# Patient Record
Sex: FEMALE | Race: ASIAN | Employment: OTHER | URBAN - NONMETROPOLITAN AREA
[De-identification: names, ages, dates, MRNs, and addresses within clinical notes are randomized per-mention and may not be internally consistent; named-entity substitution may affect disease eponyms.]

---

## 2020-03-16 ENCOUNTER — OFFICE VISIT (OUTPATIENT)
Dept: PRIMARY CARE CLINIC | Age: 75
End: 2020-03-16

## 2020-03-16 VITALS
HEART RATE: 68 BPM | SYSTOLIC BLOOD PRESSURE: 115 MMHG | BODY MASS INDEX: 27.82 KG/M2 | RESPIRATION RATE: 18 BRPM | WEIGHT: 167 LBS | DIASTOLIC BLOOD PRESSURE: 70 MMHG | OXYGEN SATURATION: 97 % | HEIGHT: 65 IN

## 2020-03-16 PROCEDURE — 99203 OFFICE O/P NEW LOW 30 MIN: CPT | Performed by: NURSE PRACTITIONER

## 2020-03-16 PROCEDURE — 99212 OFFICE O/P EST SF 10 MIN: CPT

## 2020-03-16 RX ORDER — DIMENHYDRINATE 50 MG/1
50 TABLET ORAL NIGHTLY PRN
COMMUNITY

## 2020-03-16 RX ORDER — LOSARTAN POTASSIUM AND HYDROCHLOROTHIAZIDE 12.5; 5 MG/1; MG/1
1 TABLET ORAL DAILY
COMMUNITY
End: 2020-03-23 | Stop reason: SDUPTHER

## 2020-03-16 RX ORDER — MECLIZINE HYDROCHLORIDE 25 MG/1
25 TABLET ORAL 3 TIMES DAILY PRN
COMMUNITY
End: 2020-03-23 | Stop reason: SDUPTHER

## 2020-03-16 RX ORDER — LEVOTHYROXINE SODIUM 88 UG/1
88 TABLET ORAL DAILY
COMMUNITY
End: 2020-03-23 | Stop reason: SDUPTHER

## 2020-03-16 SDOH — HEALTH STABILITY: MENTAL HEALTH: HOW OFTEN DO YOU HAVE A DRINK CONTAINING ALCOHOL?: NEVER

## 2020-03-16 NOTE — PROGRESS NOTES
[address]  [facility]gent Care  [address] [facility], [place] [place]   Phone: [phone]  Fax: [phone]    Date: 3/16/2020   Patient:  Sara Gutierres   YOB: 1945 Age: 67 y.o. MRN: [id number]   PCP: No primary care provider on file. Subjective:    Chief Complaint   Patient presents with    Dizziness     turns yellow, vomits and loose stools when this happens. Had surgery in right ear 20 years ago and had problems since then. [place] and they did MRI, the dizziness happens multiple times a day or week. not completly passed out. HPI: Patient presents with complaints of intermittent dizziness for the past 1-2 years, but worse the past 2 weeks (is sporadic, about once a week on average). They report associated nausea and vomiting. They deny any headaches, LOC, or head trauma (her son has caught her before she fell before). She reports some underlying chronic vertigo since a right external ear surgery about 20 years ago. Her son reports her symptoms are similar to just prior to her symptoms before her right ear surgery 20 years ago. She has tried antivert in the past. She does not have a PCP. She has not been seen by an ENT. She has not tried physical therapy or chiropractor yet. Her son reports she had a brain MRI about 6 months ago, which showed no abnormalities. She was instructed to see an ENT, but has not yet. History is obtained from the patient and previous medical records. All other review of systems negative.      No Known Allergies    Current Outpatient Medications   Medication Sig Dispense Refill    meclizine (ANTIVERT) 25 MG tablet Take 25 mg by mouth 3 times daily as needed      metFORMIN (GLUCOPHAGE) 500 MG tablet Take 500 mg by mouth 2 times daily (with meals)      levothyroxine (EUTHYROX) 88 MCG tablet Take 88 mcg by mouth Daily      losartan-hydrochlorothiazide (HYZAAR) 50-12.5 MG per tablet Take 1 tablet by mouth

## 2020-03-16 NOTE — PATIENT INSTRUCTIONS
Patient Education        Epley Maneuver for Vertigo: Exercises  Your Care Instructions  The Epley Maneuver is a series of movements your doctor may use to treat your vertigo. Here are the steps for the exercises. Your doctor or physical therapist will guide you through the movements. A single 10- to 15-minute session often is all that's needed. Crystal debris (canaliths) cause the vertigo. When your head is moved into different positions, the debris moves freely. This may cause your symptoms to stop. How to do the exercises  Step 1   1. You will sit on the doctor's exam table. Your legs will be out in front of you. The doctor or physical therapist will turn your head so that it is nursing home between looking straight ahead and looking to the side that causes the worst vertigo. 2. Without changing your head position, he or she will guide you back quickly. Your shoulders will be on the table. Your head will hang over the edge of the table. At this point, the side of your head that is causing the worst vertigo will face the floor. You'll stay in this position for 30 seconds or until your symptoms stop. Step 2   1. Then, the doctor or physical therapist will turn your head to the other side. You don't need to lift your head. The other side of your head will face the floor. You will stay in this position for 30 seconds or until your symptoms stop. Step 3   1. The doctor or physical therapist will help you roll your body in the same direction that your head is facing. You will lie on your side. (For example, if you are looking to your right, you will roll onto your right side.) The side that causes the worst symptoms should be facing up. You'll stay in this position for another 30 seconds or until your symptoms stop. Step 4   1. The doctor or physical therapist will then help you to sit back up. Your legs will hang off the table on the same side that you were facing.     Follow-up care is a key part of your treatment and safety. Be sure to make and go to all appointments, and call your doctor if you are having problems. It's also a good idea to know your test results and keep a list of the medicines you take. Where can you learn more? Go to https://denise.CruiseWise. org and sign in to your [employer] account. Enter N032 in the [employer] box to learn more about \"Epley Maneuver for Vertigo: Exercises. \"     If you do not have an account, please click on the \"Sign Up Now\" link. Current as of: July 28, 2019Content Version: 12.4  © 0732-9509 Healthwise, [employer]ope. Care instructions adapted under license by [facility] ([facility]). If you have questions about a medical condition or this instruction, always ask your healthcare professional. [place] [address] any warranty or liability for your use of this information. Patient Education        Epley Maneuver at Home for Vertigo: Exercises  Introduction  Vertigo is a spinning or whirling sensation when you move your head. Your doctor may have moved you in different positions to help your vertigo get better faster. This is called the Epley maneuver. Your doctor also may have asked you to do these exercises at home. Do the exercises as often as your doctor recommends. If your vertigo is getting worse, your doctor may have you change the exercise or stop it. Step 1  Step 1   1. Sit on the edge of a bed or sofa. Step 2   1. Turn your head 45 degrees in the direction your doctor told you to. This should be toward the ear that causes the most vertigo for you. In this picture, the woman is turning toward her left ear. Step 3   1. Tilt yourself backward until you are lying on your back. Your head should still be at a 45-degree turn. Your head should be about midway between looking straight ahead and looking out to your side. Hold for 30 seconds. If you have vertigo, stay in this position until it stops. Step 4   1.  Turn your head 90 degrees toward the ear that has the least vertigo. In this picture, the woman is turning to the right because she has vertigo on her left side. The point of your chin should be raised and over your shoulder. Hold for 30 seconds. Step 5   1. Roll onto the side with the least vertigo. You should now be looking at the floor. Hold for 30 seconds. Follow-up care is a key part of your treatment and safety. Be sure to make and go to all appointments, and call your doctor if you are having problems. It's also a good idea to know your test results and keep a list of the medicines you take. Where can you learn more? Go to https://Vital Connectpepiceweb.ENDYMION. org and sign in to your [employer] account. Enter O077 in the [employer] box to learn more about \"Epley Maneuver at Home for Vertigo: Exercises. \"     If you do not have an account, please click on the \"Sign Up Now\" link. Current as of: November 19, 2019Content Version: 12.4  © 9212-5056 Healthwise, Incorporated. Care instructions adapted under license by [facility] ([facility]). If you have questions about a medical condition or this instruction, always ask your healthcare professional. [place] [address] any warranty or liability for your use of this information. Patient Education        Dizziness: Care Instructions  Your Care Instructions  Dizziness is the feeling of unsteadiness or fuzziness in your head. It is different than having vertigo, which is a feeling that the room is spinning or that you are moving or falling. It is also different from lightheadedness, which is the feeling that you are about to faint. It can be hard to know what causes dizziness. Some people feel dizzy when they have migraine headaches. Sometimes bouts of flu can make you feel dizzy. Some medical conditions, such as heart problems or high blood pressure, can make you feel dizzy.  Many medicines can cause dizziness, including medicines for high blood pressure, speaking. ? Sudden confusion or trouble understanding simple statements. ? Sudden problems with walking or balance. ? A sudden, severe headache that is different from past headaches.    Call your doctor now or seek immediate medical care if:    · You feel dizzy and have a fever, headache, or ringing in your ears.     · You have new or increased nausea and vomiting.     · Your dizziness does not go away or comes back.    Watch closely for changes in your health, and be sure to contact your doctor if:    · You do not get better as expected. Where can you learn more? Go to https://chpepiceweb.[email or web address] org and sign in to your [employer] account. Enter O706 in the [employer] box to learn more about \"Dizziness: Care Instructions. \"     If you do not have an account, please click on the \"Sign Up Now\" link. Current as of: June 26, 2019Content Version: 12.4  © 8447-8141 Pressflip. Care instructions adapted under license by [facility] ([facility]). If you have questions about a medical condition or this instruction, always ask your healthcare professional. [place] [address] any warranty or liability for your use of this information. Patient Education        Vertigo: Exercises  Introduction  Here are some examples of exercises for you to try. The exercises may be suggested for a condition or for rehabilitation. Start each exercise slowly. Ease off the exercises if you start to have pain. You will be told when to start these exercises and which ones will work best for you. How to do the exercises  Exercise 1   1. Stand with a chair in front of you and a wall behind you. If you begin to fall, you may use them for support. 2. Stand with your feet together and your arms at your sides. 3. Move your head up and down 10 times. Exercise 2   1. Move your head side to side 10 times. Exercise 3   1. Move your head diagonally up and down 10 times. Exercise 4   1.  Move your head diagonally up and down 10 times on the other side. Follow-up care is a key part of your treatment and safety. Be sure to make and go to all appointments, and call your doctor if you are having problems. It's also a good idea to know your test results and keep a list of the medicines you take. Where can you learn more? Go to https://chpepiceweb.[email or web address] org and sign in to your [employer] account. Enter F349 in the [employer] box to learn more about \"Vertigo: Exercises. \"     If you do not have an account, please click on the \"Sign Up Now\" link. Current as of: July 28, 2019Content Version: 12.4  © 8365-1351 Healthwise, Incorporated. Care instructions adapted under license by [facility] ([facility]). If you have questions about a medical condition or this instruction, always ask your healthcare professional. [place] [address] any warranty or liability for your use of this information. Patient Education        Lightheadedness or Faintness: Care Instructions  Your Care Instructions  Lightheadedness is a feeling that you are about to faint or \"pass out. \" You do not feel as if you or your surroundings are moving. It is different from vertigo, which is the feeling that you or things around you are spinning or tilting. Lightheadedness usually goes away or gets better when you lie down. If lightheadedness gets worse, it can lead to a fainting spell. It is common to feel lightheaded from time to time. Lightheadedness usually is not caused by a serious problem. It often is caused by a short-lasting drop in blood pressure and blood flow to your head that occurs when you get up too quickly from a seated or lying position. Follow-up care is a key part of your treatment and safety. Be sure to make and go to all appointments, and call your doctor if you are having problems. It's also a good idea to know your test results and keep a list of the medicines you take.   How can you care for yourself at home? · Lie down for 1 or 2 minutes when you feel lightheaded. After lying down, sit up slowly and remain sitting for 1 to 2 minutes before slowly standing up. · Avoid movements, positions, or activities that have made you lightheaded in the past.  · Get plenty of rest, especially if you have a cold or flu, which can cause lightheadedness. · Make sure you drink plenty of fluids, especially if you have a fever or have been sweating. · Do not drive or put yourself and others in danger while you feel lightheaded. When should you call for help? Call 911 anytime you think you may need emergency care. For example, call if:    · You have symptoms of a stroke. These may include:  ? Sudden numbness, tingling, weakness, or loss of movement in your face, arm, or leg, especially on only one side of your body. ? Sudden vision changes. ? Sudden trouble speaking. ? Sudden confusion or trouble understanding simple statements. ? Sudden problems with walking or balance. ? A sudden, severe headache that is different from past headaches.     · You have symptoms of a heart attack. These may include:  ? Chest pain or pressure, or a strange feeling in the chest.  ? Sweating. ? Shortness of breath. ? Nausea or vomiting. ? Pain, pressure, or a strange feeling in the back, neck, jaw, or upper belly or in one or both shoulders or arms. ? Lightheadedness or sudden weakness. ? A fast or irregular heartbeat. After you call  911, the  may tell you to chew 1 adult-strength or 2 to 4 low-dose aspirin. Wait for an ambulance. Do not try to drive yourself.    Watch closely for changes in your health, and be sure to contact your doctor if:    · Your lightheadedness gets worse or does not get better with home care. Where can you learn more? Go to https://denise.[email or web address] org and sign in to your [employer] account.  Enter O022 in the [employer] box to learn more about \"Lightheadedness or

## 2020-03-17 ENCOUNTER — HOSPITAL ENCOUNTER (OUTPATIENT)
Dept: PHYSICAL THERAPY | Age: 75
Setting detail: THERAPIES SERIES
Discharge: HOME | End: 2020-03-17

## 2020-03-17 PROCEDURE — 97112 NEUROMUSCULAR REEDUCATION: CPT

## 2020-03-17 PROCEDURE — 97161 PT EVAL LOW COMPLEX 20 MIN: CPT

## 2020-03-17 NOTE — PROGRESS NOTES
Physical Therapy  Initial Assessment  Date: 3/17/2020  Patient Name: Sara Gutierres  MRN: [id number]  : 1945          Restrictions  Restrictions/Precautions  Restrictions/Precautions: (Does Not Speak English )    Subjective   General  Chart Reviewed: Yes  Patient assessed for rehabilitation services?: Yes  Referring Practitioner: BRITTANY Duran CNP  Referral Date : 20  Diagnosis: R42 (ICD-10-CM) - Dizziness  Subjective  Subjective: Dizziness. turns yellow, vomits and loose stools when this happens. Had surgery in right ear 20 years ago and had problems since then. [place] and they did MRI, the dizziness happens multiple times a day or week. not completly passed out. Vision/Hearing       Orientation  Orientation  Overall Orientation Status: Within Functional Limits    Social/Functional History       Objective     Observation/Palpation  Observation: + saccades, + smooth pursuit, + Left [place] with mild nystagmus per son patient states \"a little bit of dizziness. \" Right Chantelle Hallike mild to moderate nystagmus with mild to moderate dizziness. Additional Measures  Other: EO no sway noted, EC min sway                                              Assessment   Conditions Requiring Skilled Therapeutic Intervention  Body structures, Functions, Activity limitations: Decreased functional mobility ; Decreased ADL status; Vestibular Impairment  Assessment: Patient to the clinic with vestibular diagnosis. Patient had + VOR in 2 of 2 tests.  + Chantelle Hallpike bilaterally R> L.    Prognosis: Good  Decision Making: Low Complexity  REQUIRES PT FOLLOW UP: Yes  Activity Tolerance  Activity Tolerance: Patient Tolerated treatment well         Plan   Plan  Times per week: 1-2x/week   Plan weeks: 6 weeks   Current Treatment Recommendations: ADL/Self-care Training, Gait Training, Manual Therapy - Joint Manipulation, Patient/Caregiver Education & Training, Vestibular Rehab, Home Exercise Program, Safety Education & Training, Balance Training    G-Code       OutComes Score                                                  AM-PAC Score             Goals  Short term goals  Time Frame for Short term goals: 3 weeks   Short term goal 1: Initiate HEP   Short term goal 2: Perform CRM   Long term goals  Time Frame for Long term goals : 6 weeks  Long term goal 1: Indpendent in HEP   Long term goal 2: Patient to note no spinning with rolling in bed. Long term goal 3: Patient to note no nausea with bending or looking up/   Long term goal 4: Patient to note 50% improvement in dizziness.    Patient Goals   Patient goals : Decrease Dizziness        Therapy Time   Individual Concurrent Group Co-treatment   Time In 0910         Time Out 0950         Minutes 40         Timed Code Treatment Minutes: 15 [facility], PT

## 2020-03-17 NOTE — FLOWSHEET NOTE
increasing ROM, reducing/eliminating soft tissue swelling/inflammation/restriction, improving soft tissue extensibility. (72078)    Service Based Modalities:      Timed Code Treatment Minutes:   10' Neuro     Total Treatment Minutes:   36'    Treatment/Activity Tolerance:  [x] Patient tolerated treatment well [] Patient limited by fatique  [] Patient limited by pain  [] Patient limited by other medical complications  [] Other:     Prognosis: [x] Good [] Fair  [] Poor    Patient Requires Follow-up: [x] Yes  [] No      Goals:  Short term goals  Time Frame for Short term goals: 3 weeks   Short term goal 1: Initiate HEP   Short term goal 2: Perform CRM     Long term goals  Time Frame for Long term goals : 6 weeks  Long term goal 1: Indpendent in HEP   Long term goal 2: Patient to note no spinning with rolling in bed. Long term goal 3: Patient to note no nausea with bending or looking up/   Long term goal 4: Patient to note 50% improvement in dizziness.            Plan:   [] Continue per plan of care [] Alter current plan (see comments)  [x] Plan of care initiated [] Hold pending MD visit [] Discharge  Plan for Next Session:      Electronically signed by:  Zoe Irwin

## 2020-03-17 NOTE — PLAN OF CARE
[facility]    [x] [place]  Phone: [phone]  Fax: [phone]      [] [place]  Phone: [phone]  Fax: [phone]        To: Referring Practitioner: BRITTANY Yañez CNP      Patient: Carolin Chacon  : 1945   MRN: [id number]  Evaluation Date: 3/17/2020      Diagnosis Information:  · Diagnosis: R42 (ICD-10-CM) - Dizziness   ·       Physical Therapy Certification  Dear BRITTANY Yañez CNP  The following patient has been evaluated for physical therapy services and for therapy to continue, Medicare requires monthly physician review of the treatment plan. Please review the attached evaluation and/or summary of the patient's plan of care, and verify that you agree therapy should continue by signing the attached document and sending it back to our office. Plan of Care/Treatment to date:  [x] Therapeutic Exercise    [] Modalities:  [x] Therapeutic Activity     [] Ultrasound  [] Electrical Stimulation  [x] Gait Training      [] Cervical Traction [] Lumbar Traction  [x] Neuromuscular Re-education    [] Cold/hotpack [] Iontophoresis   [x] Instruction in HEP     Other:  [] Manual Therapy      []             [] Aquatic Therapy      []           ? Goals:  Short term goals  Time Frame for Short term goals: 3 weeks   Short term goal 1: Initiate HEP   Short term goal 2: Perform CRM     Long term goals  Time Frame for Long term goals : 6 weeks  Long term goal 1: Indpendent in HEP   Long term goal 2: Patient to note no spinning with rolling in bed. Long term goal 3: Patient to note no nausea with bending or looking up/   Long term goal 4: Patient to note 50% improvement in dizziness. Frequency/Duration:3/17/20-20  # Days per week: [] 1 day # Weeks: [] 1 week [] 5 weeks     [x] 2 days?    [] 2 weeks [x] 6 weeks     [x] 3 days   [] 3 weeks [] 7 weeks     [] 4 days   [] 4 weeks [] 8 weeks    Rehab Potential: [] Excellent [x] Good [] Fair  [] Poor     Electronically signed by:  Jesica Chung PT    If you have any questions or concerns, please don't hesitate to call.   Thank you for your referral.      Physician Signature:________________________________Date:__________________  By signing above, therapists plan is approved by physician

## 2020-03-18 ENCOUNTER — OFFICE VISIT (OUTPATIENT)
Dept: PRIMARY CARE CLINIC | Age: 75
End: 2020-03-18

## 2020-03-18 VITALS
BODY MASS INDEX: 27.59 KG/M2 | HEART RATE: 76 BPM | SYSTOLIC BLOOD PRESSURE: 102 MMHG | WEIGHT: 165.6 LBS | RESPIRATION RATE: 12 BRPM | TEMPERATURE: 97.6 F | HEIGHT: 65 IN | OXYGEN SATURATION: 96 % | DIASTOLIC BLOOD PRESSURE: 64 MMHG

## 2020-03-18 PROCEDURE — 99212 OFFICE O/P EST SF 10 MIN: CPT

## 2020-03-18 PROCEDURE — 99214 OFFICE O/P EST MOD 30 MIN: CPT | Performed by: NURSE PRACTITIONER

## 2020-03-18 RX ORDER — PROPYLENE GLYCOL/PEG 400 0.3 %-0.4%
DROPS OPHTHALMIC (EYE)
Qty: 1 BOTTLE | Refills: 0 | Status: SHIPPED | OUTPATIENT
Start: 2020-03-18 | End: 2020-06-05

## 2020-03-18 ASSESSMENT — ENCOUNTER SYMPTOMS
NAUSEA: 0
DOUBLE VISION: 0
PHOTOPHOBIA: 0
FOREIGN BODY SENSATION: 0
EYE REDNESS: 0
EYE ITCHING: 0
EYE PAIN: 0
BLURRED VISION: 0
VOMITING: 0
EYE DISCHARGE: 0

## 2020-03-18 NOTE — PROGRESS NOTES
Subjective:      Patient ID: Sara Gutierres is a 67 y.o. female. Eye Problem    The right eye is affected. This is a new problem. The current episode started today. The problem occurs constantly. The problem has been unchanged. There was no injury mechanism. The pain is at a severity of 0/10. The patient is experiencing no pain. There is no known exposure to pink eye. She does not wear contacts. Pertinent negatives include no blurred vision, eye discharge, double vision, eye redness, fever, foreign body sensation, itching, nausea, photophobia, recent URI or vomiting. She has tried eye drops for the symptoms. Improvement on treatment: no change. Review of Systems   Constitutional: Negative for fever. Eyes: Negative for blurred vision, double vision, photophobia, pain, discharge, redness, itching and visual disturbance. Gastrointestinal: Negative for nausea and vomiting. All other systems reviewed and are negative. Objective:   Physical Exam  Vitals signs and nursing note reviewed. Constitutional:       Appearance: Normal appearance. HENT:      Head: Normocephalic and atraumatic. Nose: Nose normal.   Eyes:      General: Lids are normal.         Right eye: No discharge. Left eye: No discharge. Extraocular Movements: Extraocular movements intact. Conjunctiva/sclera:      Right eye: Right conjunctiva is not injected. Hemorrhage present. No chemosis or exudate. Pupils: Pupils are equal, round, and reactive to light. Pulmonary:      Effort: Pulmonary effort is normal.   Skin:     General: Skin is warm and dry. Neurological:      General: No focal deficit present. Mental Status: She is alert and oriented to person, place, and time. Psychiatric:         Mood and Affect: Mood normal.         Behavior: Behavior normal.         Thought Content: Thought content normal.         Judgment: Judgment normal.         Assessment/Plan:      1.  Conjunctival hemorrhage of right

## 2020-03-23 RX ORDER — BISOPROLOL FUMARATE 5 MG/1
5 TABLET ORAL EVERY MORNING
Qty: 30 TABLET | Refills: 11 | Status: SHIPPED | OUTPATIENT
Start: 2020-03-23 | End: 2020-04-29

## 2020-03-23 RX ORDER — MECLIZINE HYDROCHLORIDE 25 MG/1
25 TABLET ORAL 3 TIMES DAILY PRN
Qty: 60 TABLET | Refills: 3 | Status: SHIPPED | OUTPATIENT
Start: 2020-03-23 | End: 2021-01-27 | Stop reason: SDUPTHER

## 2020-03-23 RX ORDER — PAROXETINE 10 MG/1
20 TABLET, FILM COATED ORAL DAILY
Qty: 60 TABLET | Refills: 11 | Status: SHIPPED | OUTPATIENT
Start: 2020-03-23 | End: 2020-04-28 | Stop reason: SDUPTHER

## 2020-03-23 RX ORDER — LOSARTAN POTASSIUM AND HYDROCHLOROTHIAZIDE 12.5; 5 MG/1; MG/1
1 TABLET ORAL DAILY
Qty: 30 TABLET | Refills: 11 | Status: SHIPPED | OUTPATIENT
Start: 2020-03-23 | End: 2020-06-05 | Stop reason: ALTCHOICE

## 2020-03-23 RX ORDER — OMEPRAZOLE 20 MG/1
20 CAPSULE, DELAYED RELEASE ORAL DAILY
Qty: 30 CAPSULE | Refills: 11 | Status: SHIPPED | OUTPATIENT
Start: 2020-03-23 | End: 2020-07-27 | Stop reason: SDUPTHER

## 2020-03-23 RX ORDER — LEVOTHYROXINE SODIUM 88 UG/1
88 TABLET ORAL DAILY
Qty: 30 TABLET | Refills: 11 | Status: SHIPPED | OUTPATIENT
Start: 2020-03-23 | End: 2021-01-27 | Stop reason: SDUPTHER

## 2020-03-24 ENCOUNTER — APPOINTMENT (OUTPATIENT)
Dept: PHYSICAL THERAPY | Age: 75
End: 2020-03-24

## 2020-04-28 ENCOUNTER — OFFICE VISIT (OUTPATIENT)
Dept: INTERNAL MEDICINE | Age: 75
End: 2020-04-28
Payer: MEDICAID

## 2020-04-28 ENCOUNTER — HOSPITAL ENCOUNTER (OUTPATIENT)
Dept: LAB | Age: 75
Discharge: HOME | End: 2020-04-28
Payer: MEDICAID

## 2020-04-28 VITALS
SYSTOLIC BLOOD PRESSURE: 110 MMHG | HEART RATE: 66 BPM | HEIGHT: 65 IN | RESPIRATION RATE: 16 BRPM | WEIGHT: 167.6 LBS | DIASTOLIC BLOOD PRESSURE: 64 MMHG | BODY MASS INDEX: 27.92 KG/M2

## 2020-04-28 PROBLEM — E03.9 HYPOTHYROIDISM: Status: ACTIVE | Noted: 2020-04-28

## 2020-04-28 LAB
ABSOLUTE EOS #: 0.18 K/UL (ref 0–0.44)
ABSOLUTE IMMATURE GRANULOCYTE: <0.03 K/UL (ref 0–0.3)
ABSOLUTE LYMPH #: 2.49 K/UL (ref 1.1–3.7)
ABSOLUTE MONO #: 0.42 K/UL (ref 0.1–1.2)
ALBUMIN SERPL-MCNC: 4.4 G/DL (ref 3.5–5.2)
ALBUMIN/GLOBULIN RATIO: 1.5 (ref 1–2.5)
ALP BLD-CCNC: 58 U/L (ref 35–104)
ALT SERPL-CCNC: 20 U/L (ref 5–33)
ANION GAP SERPL CALCULATED.3IONS-SCNC: 11 MMOL/L (ref 9–17)
AST SERPL-CCNC: 19 U/L
BASOPHILS # BLD: 1 % (ref 0–2)
BASOPHILS ABSOLUTE: 0.05 K/UL (ref 0–0.2)
BILIRUB SERPL-MCNC: 0.37 MG/DL (ref 0.3–1.2)
BUN BLDV-MCNC: 24 MG/DL (ref 8–23)
BUN/CREAT BLD: 23 (ref 9–20)
CALCIUM SERPL-MCNC: 9 MG/DL (ref 8.6–10.4)
CHLORIDE BLD-SCNC: 101 MMOL/L (ref 98–107)
CO2: 27 MMOL/L (ref 20–31)
CREAT SERPL-MCNC: 1.06 MG/DL (ref 0.5–0.9)
DIFFERENTIAL TYPE: NORMAL
EOSINOPHILS RELATIVE PERCENT: 3 % (ref 1–4)
GFR AFRICAN AMERICAN: >60 ML/MIN
GFR NON-AFRICAN AMERICAN: 50 ML/MIN
GFR SERPL CREATININE-BSD FRML MDRD: ABNORMAL ML/MIN/{1.73_M2}
GFR SERPL CREATININE-BSD FRML MDRD: ABNORMAL ML/MIN/{1.73_M2}
GLUCOSE BLD-MCNC: 120 MG/DL (ref 70–99)
HCT VFR BLD CALC: 37.2 % (ref 36.3–47.1)
HEMOGLOBIN: 12.2 G/DL (ref 11.9–15.1)
IMMATURE GRANULOCYTES: 0 %
LYMPHOCYTES # BLD: 36 % (ref 24–43)
MCH RBC QN AUTO: 29.3 PG (ref 25.2–33.5)
MCHC RBC AUTO-ENTMCNC: 32.8 G/DL (ref 25.2–33.5)
MCV RBC AUTO: 89.4 FL (ref 82.6–102.9)
MONOCYTES # BLD: 6 % (ref 3–12)
NRBC AUTOMATED: NORMAL PER 100 WBC
PDW BLD-RTO: 13.9 % (ref 11.8–14.4)
PLATELET # BLD: 245 K/UL (ref 138–453)
PLATELET ESTIMATE: NORMAL
PMV BLD AUTO: 11.3 FL (ref 8.1–13.5)
POTASSIUM SERPL-SCNC: 4.5 MMOL/L (ref 3.7–5.3)
RBC # BLD: 4.16 M/UL (ref 3.95–5.11)
RBC # BLD: NORMAL 10*6/UL
SEG NEUTROPHILS: 55 % (ref 36–65)
SEGMENTED NEUTROPHILS ABSOLUTE COUNT: 3.76 K/UL (ref 1.5–8.1)
SODIUM BLD-SCNC: 139 MMOL/L (ref 135–144)
TOTAL PROTEIN: 7.3 G/DL (ref 6.4–8.3)
TSH SERPL DL<=0.05 MIU/L-ACNC: 1.27 MIU/L (ref 0.3–5)
VITAMIN B-12: 348 PG/ML (ref 232–1245)
VITAMIN D 25-HYDROXY: 29.2 NG/ML (ref 30–100)
WBC # BLD: 6.9 K/UL (ref 3.5–11.3)
WBC # BLD: NORMAL 10*3/UL

## 2020-04-28 PROCEDURE — G8417 CALC BMI ABV UP PARAM F/U: HCPCS | Performed by: INTERNAL MEDICINE

## 2020-04-28 PROCEDURE — G8400 PT W/DXA NO RESULTS DOC: HCPCS | Performed by: INTERNAL MEDICINE

## 2020-04-28 PROCEDURE — 82306 VITAMIN D 25 HYDROXY: CPT

## 2020-04-28 PROCEDURE — 1123F ACP DISCUSS/DSCN MKR DOCD: CPT | Performed by: INTERNAL MEDICINE

## 2020-04-28 PROCEDURE — 4040F PNEUMOC VAC/ADMIN/RCVD: CPT | Performed by: INTERNAL MEDICINE

## 2020-04-28 PROCEDURE — G8427 DOCREV CUR MEDS BY ELIG CLIN: HCPCS | Performed by: INTERNAL MEDICINE

## 2020-04-28 PROCEDURE — 1090F PRES/ABSN URINE INCON ASSESS: CPT | Performed by: INTERNAL MEDICINE

## 2020-04-28 PROCEDURE — 36415 COLL VENOUS BLD VENIPUNCTURE: CPT

## 2020-04-28 PROCEDURE — 99204 OFFICE O/P NEW MOD 45 MIN: CPT

## 2020-04-28 PROCEDURE — 85025 COMPLETE CBC W/AUTO DIFF WBC: CPT

## 2020-04-28 PROCEDURE — 80053 COMPREHEN METABOLIC PANEL: CPT

## 2020-04-28 PROCEDURE — 99204 OFFICE O/P NEW MOD 45 MIN: CPT | Performed by: INTERNAL MEDICINE

## 2020-04-28 PROCEDURE — 1036F TOBACCO NON-USER: CPT | Performed by: INTERNAL MEDICINE

## 2020-04-28 PROCEDURE — 84443 ASSAY THYROID STIM HORMONE: CPT

## 2020-04-28 PROCEDURE — 82607 VITAMIN B-12: CPT

## 2020-04-28 RX ORDER — PAROXETINE HYDROCHLORIDE 20 MG/1
20 TABLET, FILM COATED ORAL DAILY
Qty: 30 TABLET | Refills: 3 | Status: SHIPPED | OUTPATIENT
Start: 2020-04-28 | End: 2020-06-30

## 2020-04-28 RX ORDER — FLUTICASONE PROPIONATE 50 MCG
1 SPRAY, SUSPENSION (ML) NASAL DAILY
Qty: 1 BOTTLE | Refills: 5 | Status: SHIPPED | OUTPATIENT
Start: 2020-04-28 | End: 2020-07-16 | Stop reason: SDUPTHER

## 2020-04-29 RX ORDER — PROPRANOLOL HCL 60 MG
60 CAPSULE, EXTENDED RELEASE 24HR ORAL DAILY
Qty: 30 CAPSULE | Refills: 5 | Status: SHIPPED | OUTPATIENT
Start: 2020-04-29 | End: 2020-07-27 | Stop reason: SDUPTHER

## 2020-05-11 ENCOUNTER — TELEPHONE (OUTPATIENT)
Dept: INTERNAL MEDICINE | Age: 75
End: 2020-05-11

## 2020-05-11 NOTE — TELEPHONE ENCOUNTER
Sent meter and supplies as well as Hydrocortisone cream to [facility]. Can use cream on her hands twice daily as needed until they heal. She should ovoid putting it on open wounds in her hands.

## 2020-05-15 ENCOUNTER — TELEPHONE (OUTPATIENT)
Dept: INTERNAL MEDICINE | Age: 75
End: 2020-05-15

## 2020-05-15 RX ORDER — TEMAZEPAM 15 MG/1
15 CAPSULE ORAL NIGHTLY PRN
Qty: 30 CAPSULE | Refills: 2 | Status: SHIPPED | OUTPATIENT
Start: 2020-05-15 | End: 2020-05-29

## 2020-05-18 RX ORDER — BLOOD SUGAR DIAGNOSTIC
STRIP MISCELLANEOUS
Qty: 100 STRIP | Refills: 3 | Status: SHIPPED | OUTPATIENT
Start: 2020-05-18

## 2020-05-19 ENCOUNTER — OFFICE VISIT (OUTPATIENT)
Dept: OPTOMETRY | Age: 75
End: 2020-05-19
Payer: MEDICAID

## 2020-05-19 ENCOUNTER — TELEPHONE (OUTPATIENT)
Dept: INTERNAL MEDICINE | Age: 75
End: 2020-05-19

## 2020-05-19 PROCEDURE — 99212 OFFICE O/P EST SF 10 MIN: CPT

## 2020-05-19 PROCEDURE — 99214 OFFICE O/P EST MOD 30 MIN: CPT | Performed by: OPTOMETRIST

## 2020-05-19 RX ORDER — FLUCONAZOLE 150 MG/1
150 TABLET ORAL ONCE
Qty: 1 TABLET | Refills: 1 | Status: SHIPPED | OUTPATIENT
Start: 2020-05-19 | End: 2020-05-19

## 2020-05-19 RX ORDER — KETOTIFEN FUMARATE 0.35 MG/ML
1 SOLUTION/ DROPS OPHTHALMIC 2 TIMES DAILY
Qty: 1 ML | Refills: 1 | Status: SHIPPED | OUTPATIENT
Start: 2020-05-19 | End: 2020-05-29

## 2020-05-19 ASSESSMENT — REFRACTION_MANIFEST
OD_AXIS: 082
OD_ADD: +2.50
OS_AXIS: 077
OS_CYLINDER: -1.50
OS_ADD: +2.50
OS_SPHERE: +2.00
OD_SPHERE: +3.50
OD_CYLINDER: -1.00

## 2020-05-19 ASSESSMENT — VISUAL ACUITY
OD_SC: 20/100
OD_SC+: -1
METHOD: ALLEN PICTURES
OS_SC+: -1
OS_SC: 20/60

## 2020-05-19 ASSESSMENT — SLIT LAMP EXAM - LIDS
COMMENTS: NORMAL
COMMENTS: NORMAL

## 2020-05-19 ASSESSMENT — TONOMETRY
OD_IOP_MMHG: 14
IOP_METHOD: NON-CONTACT AIR PUFF
OS_IOP_MMHG: 15

## 2020-05-19 NOTE — PROGRESS NOTES
Abdi Carlos presents today for   Chief Complaint   Patient presents with    Dry Eye     When alleries, the eye water; Not taking medication for allergies     Vision Exam   .    HPI     Dry Eye      Additional comments: When alleries, the eye water; Not taking medication for allergies               Comments     Last Vision Exam: 3 yrs ago  Last Ophthalmology Exam: n/a  Last Filled Glasses Rx: 3 yrs ago  Insurance: March  Update: Glasses  Son states that she did have a broken blood vessel a few weeks ago but it is gone now. Does wear glasses, only when she reads  Did not bring glasses with her today. Wear bifocal to see tv and reading                Current Outpatient Medications   Medication Sig Dispense Refill    ketotifen (ZADITOR) 0.025 % ophthalmic solution Place 1 drop into both eyes 2 times daily for 10 days 1 mL 1    ONETOUCH VERIO strip use 1 TEST STRIP to TEST BLOOD SUGAR twice a day 100 strip 3    temazepam (RESTORIL) 15 MG capsule Take 1 capsule by mouth nightly as needed for Sleep for up to 14 days. 30 capsule 2    blood glucose monitor kit and supplies Test 2 times a day & as needed for symptoms of irregular blood glucose. 1 kit 0    hydrocortisone 2.5 % cream Apply topically 2 times daily.  1 Tube 0    propranolol (INDERAL LA) 60 MG extended release capsule Take 1 capsule by mouth daily 30 capsule 5    fluticasone (FLONASE) 50 MCG/ACT nasal spray 1 spray by Each Nostril route daily 1 Bottle 5    PARoxetine (PAXIL) 20 MG tablet Take 1 tablet by mouth daily 30 tablet 3    levothyroxine (EUTHYROX) 88 MCG tablet Take 1 tablet by mouth Daily 30 tablet 11    losartan-hydrochlorothiazide (HYZAAR) 50-12.5 MG per tablet Take 1 tablet by mouth daily 30 tablet 11    meclizine (ANTIVERT) 25 MG tablet Take 1 tablet by mouth 3 times daily as needed for Dizziness 60 tablet 3    omeprazole (PRILOSEC) 20 MG delayed release capsule Take 1 capsule by mouth Daily 30 capsule 11    metFORMIN (GLUCOPHAGE)

## 2020-05-19 NOTE — TELEPHONE ENCOUNTER
Patients son called. Patient has a yeast infection, she believes. She is on many medications and tells him she has many indications of  Yeast infection in  [facility] and [place] parts\".   Can you call something to [facility]   Let him know [phone]

## 2020-05-20 ENCOUNTER — TELEPHONE (OUTPATIENT)
Dept: INTERNAL MEDICINE | Age: 75
End: 2020-05-20

## 2020-05-26 ENCOUNTER — HOSPITAL ENCOUNTER (OUTPATIENT)
Dept: ULTRASOUND IMAGING | Age: 75
Discharge: HOME | End: 2020-05-28
Payer: MEDICAID

## 2020-05-26 ENCOUNTER — TELEPHONE (OUTPATIENT)
Dept: INTERNAL MEDICINE | Age: 75
End: 2020-05-26

## 2020-05-26 ENCOUNTER — HOSPITAL ENCOUNTER (OUTPATIENT)
Age: 75
Setting detail: SPECIMEN
Discharge: HOME | End: 2020-05-26
Payer: MEDICAID

## 2020-05-26 ENCOUNTER — OFFICE VISIT (OUTPATIENT)
Dept: OBGYN | Age: 75
End: 2020-05-26
Payer: MEDICAID

## 2020-05-26 VITALS
HEART RATE: 72 BPM | TEMPERATURE: 96.7 F | BODY MASS INDEX: 27.99 KG/M2 | DIASTOLIC BLOOD PRESSURE: 80 MMHG | RESPIRATION RATE: 18 BRPM | SYSTOLIC BLOOD PRESSURE: 124 MMHG | HEIGHT: 65 IN | WEIGHT: 168 LBS

## 2020-05-26 PROCEDURE — 99214 OFFICE O/P EST MOD 30 MIN: CPT

## 2020-05-26 PROCEDURE — G8400 PT W/DXA NO RESULTS DOC: HCPCS | Performed by: OBSTETRICS & GYNECOLOGY

## 2020-05-26 PROCEDURE — 1090F PRES/ABSN URINE INCON ASSESS: CPT | Performed by: OBSTETRICS & GYNECOLOGY

## 2020-05-26 PROCEDURE — 1036F TOBACCO NON-USER: CPT | Performed by: OBSTETRICS & GYNECOLOGY

## 2020-05-26 PROCEDURE — G8427 DOCREV CUR MEDS BY ELIG CLIN: HCPCS | Performed by: OBSTETRICS & GYNECOLOGY

## 2020-05-26 PROCEDURE — 99203 OFFICE O/P NEW LOW 30 MIN: CPT | Performed by: OBSTETRICS & GYNECOLOGY

## 2020-05-26 PROCEDURE — 87070 CULTURE OTHR SPECIMN AEROBIC: CPT

## 2020-05-26 PROCEDURE — 1123F ACP DISCUSS/DSCN MKR DOCD: CPT | Performed by: OBSTETRICS & GYNECOLOGY

## 2020-05-26 PROCEDURE — G8417 CALC BMI ABV UP PARAM F/U: HCPCS | Performed by: OBSTETRICS & GYNECOLOGY

## 2020-05-26 PROCEDURE — 87086 URINE CULTURE/COLONY COUNT: CPT

## 2020-05-26 PROCEDURE — 76830 TRANSVAGINAL US NON-OB: CPT

## 2020-05-26 PROCEDURE — 4040F PNEUMOC VAC/ADMIN/RCVD: CPT | Performed by: OBSTETRICS & GYNECOLOGY

## 2020-05-26 PROCEDURE — 76856 US EXAM PELVIC COMPLETE: CPT

## 2020-05-26 PROCEDURE — 86403 PARTICLE AGGLUT ANTBDY SCRN: CPT

## 2020-05-26 RX ORDER — CLOTRIMAZOLE AND BETAMETHASONE DIPROPIONATE 10; .64 MG/G; MG/G
CREAM TOPICAL
Qty: 1 TUBE | Refills: 0 | Status: SHIPPED | OUTPATIENT
Start: 2020-05-26 | End: 2020-06-23 | Stop reason: SDUPTHER

## 2020-05-26 NOTE — PROGRESS NOTES
Subjective:      Patient ID: Philipp Gavin  is a 67 y.o. y.o. female. C/o vaginal discharge and irritation x few days   Also c/o dysuria, occ pelvic pain on the right side   Seen with intreperater    Vaginal Itching   This is a chronic problem. The current episode started more than 1 month ago. The problem occurs intermittently. The problem has been waxing and waning. Nothing aggravates the symptoms. She has tried nothing for the symptoms. The treatment provided no relief. Chief Complaint   Patient presents with    Vaginal Itching     burning sensation     Family History   Problem Relation Age of Onset    Cataracts Neg Hx     Diabetes Neg Hx     Glaucoma Neg Hx      Past Medical History:   Diagnosis Date    Chronic kidney disease     HTN (hypertension)      Past Surgical History:   Procedure Laterality Date    EXTERNAL EAR SURGERY      20 years ago      reports that she has never smoked. She has never used smokeless tobacco. She reports previous alcohol use. She reports that she does not use drugs. No Known Allergies    Current Outpatient Medications:     clotrimazole-betamethasone (LOTRISONE) 1-0.05 % cream, Apply a thin film to the affected area 2 times daily. , Disp: 1 Tube, Rfl: 0    ketotifen (ZADITOR) 0.025 % ophthalmic solution, Place 1 drop into both eyes 2 times daily for 10 days, Disp: 1 mL, Rfl: 1    ONETOUCH VERIO strip, use 1 TEST STRIP to TEST BLOOD SUGAR twice a day, Disp: 100 strip, Rfl: 3    temazepam (RESTORIL) 15 MG capsule, Take 1 capsule by mouth nightly as needed for Sleep for up to 14 days. , Disp: 30 capsule, Rfl: 2    blood glucose monitor kit and supplies, Test 2 times a day & as needed for symptoms of irregular blood glucose., Disp: 1 kit, Rfl: 0    hydrocortisone 2.5 % cream, Apply topically 2 times daily. , Disp: 1 Tube, Rfl: 0    propranolol (INDERAL LA) 60 MG extended release capsule, Take 1 capsule by mouth daily, Disp: 30 capsule, Rfl: 5    fluticasone

## 2020-05-27 LAB
CULTURE: NORMAL
Lab: NORMAL
SPECIMEN DESCRIPTION: NORMAL

## 2020-05-27 RX ORDER — CEPHALEXIN 250 MG/1
250 CAPSULE ORAL 3 TIMES DAILY
Qty: 21 CAPSULE | Refills: 0 | Status: SHIPPED | OUTPATIENT
Start: 2020-05-27 | End: 2020-06-03

## 2020-05-28 ENCOUNTER — OFFICE VISIT (OUTPATIENT)
Dept: VASCULAR SURGERY | Age: 75
End: 2020-05-28
Payer: MEDICAID

## 2020-05-28 VITALS
BODY MASS INDEX: 27.89 KG/M2 | HEIGHT: 65 IN | DIASTOLIC BLOOD PRESSURE: 62 MMHG | HEART RATE: 76 BPM | SYSTOLIC BLOOD PRESSURE: 128 MMHG | WEIGHT: 167.4 LBS

## 2020-05-28 PROBLEM — I83.812 VARICOSE VEINS OF LEFT LOWER EXTREMITY WITH PAIN: Status: ACTIVE | Noted: 2020-05-28

## 2020-05-28 PROCEDURE — 99214 OFFICE O/P EST MOD 30 MIN: CPT | Performed by: SURGERY

## 2020-05-28 PROCEDURE — 99202 OFFICE O/P NEW SF 15 MIN: CPT | Performed by: SURGERY

## 2020-05-28 PROCEDURE — G8417 CALC BMI ABV UP PARAM F/U: HCPCS | Performed by: SURGERY

## 2020-05-28 PROCEDURE — 4040F PNEUMOC VAC/ADMIN/RCVD: CPT | Performed by: SURGERY

## 2020-05-28 PROCEDURE — 1090F PRES/ABSN URINE INCON ASSESS: CPT | Performed by: SURGERY

## 2020-05-28 PROCEDURE — G8427 DOCREV CUR MEDS BY ELIG CLIN: HCPCS | Performed by: SURGERY

## 2020-05-28 PROCEDURE — 1123F ACP DISCUSS/DSCN MKR DOCD: CPT | Performed by: SURGERY

## 2020-05-28 PROCEDURE — G8400 PT W/DXA NO RESULTS DOC: HCPCS | Performed by: SURGERY

## 2020-05-28 PROCEDURE — 1036F TOBACCO NON-USER: CPT | Performed by: SURGERY

## 2020-05-28 NOTE — PROGRESS NOTES
diarrhea, constipation, abdominal pain  Genitourinary:  negative for frequency, dysuria  Integument/Breast:  negative for rash, skin lesions  Musculoskeletal:  negative for muscle aches or joint pain  Neurological:  negative for headaches, dizziness, lightheadedness, numbness, pain and tingling extremities  Behavior/Psych:  negative for depression and anxiety    Allergies: Patient has no known allergies. Current Meds:  Current Outpatient Medications:     cephALEXin (KEFLEX) 250 MG capsule, Take 1 capsule by mouth 3 times daily for 7 days, Disp: 21 capsule, Rfl: 0    clotrimazole-betamethasone (LOTRISONE) 1-0.05 % cream, Apply a thin film to the affected area 2 times daily. , Disp: 1 Tube, Rfl: 0    ketotifen (ZADITOR) 0.025 % ophthalmic solution, Place 1 drop into both eyes 2 times daily for 10 days, Disp: 1 mL, Rfl: 1    ONETOUCH VERIO strip, use 1 TEST STRIP to TEST BLOOD SUGAR twice a day, Disp: 100 strip, Rfl: 3    temazepam (RESTORIL) 15 MG capsule, Take 1 capsule by mouth nightly as needed for Sleep for up to 14 days. , Disp: 30 capsule, Rfl: 2    blood glucose monitor kit and supplies, Test 2 times a day & as needed for symptoms of irregular blood glucose., Disp: 1 kit, Rfl: 0    hydrocortisone 2.5 % cream, Apply topically 2 times daily. , Disp: 1 Tube, Rfl: 0    propranolol (INDERAL LA) 60 MG extended release capsule, Take 1 capsule by mouth daily, Disp: 30 capsule, Rfl: 5    fluticasone (FLONASE) 50 MCG/ACT nasal spray, 1 spray by Each Nostril route daily, Disp: 1 Bottle, Rfl: 5    PARoxetine (PAXIL) 20 MG tablet, Take 1 tablet by mouth daily, Disp: 30 tablet, Rfl: 3    levothyroxine (EUTHYROX) 88 MCG tablet, Take 1 tablet by mouth Daily, Disp: 30 tablet, Rfl: 11    losartan-hydrochlorothiazide (HYZAAR) 50-12.5 MG per tablet, Take 1 tablet by mouth daily, Disp: 30 tablet, Rfl: 11    meclizine (ANTIVERT) 25 MG tablet, Take 1 tablet by mouth 3 times daily as needed for Dizziness, Disp: 60 tablet, dorsalis pedis 2+ L dorsalis pedis 2+     Labs:   Lab Results   Component Value Date    WBC 6.9 04/28/2020    HGB 12.2 04/28/2020    HCT 37.2 04/28/2020    MCV 89.4 04/28/2020     04/28/2020     Lab Results   Component Value Date     04/28/2020    K 4.5 04/28/2020     04/28/2020    CO2 27 04/28/2020    BUN 24 04/28/2020    CREATININE 1.06 04/28/2020    GLUCOSE 120 04/28/2020    CALCIUM 9.0 04/28/2020     Lab Results   Component Value Date    ALKPHOS 58 04/28/2020    ALT 20 04/28/2020    AST 19 04/28/2020    PROT 7.3 04/28/2020    BILITOT 0.37 04/28/2020    LABALBU 4.4 04/28/2020     No results found for: LACTA  No results found for: AMYLASE  Lab Results   Component Value Date    LIPASE 46 11/06/2015     Lab Results   Component Value Date    INR 0.9 11/23/2015         Assessment:  3 78-year-old female with a tender varicose vein of the left lateral leg    1. Asymptomatic varicose veins of lower extremity without complication, bilateral    2. Varicose veins of left lower extremity with pain        Plan:   1. I will order a venous reflux scan to see if there is any underlying pathology. Otherwise I will call her with the results.     Orders Placed This Encounter   Procedures    VL DUP LOWER EXTREMITY VENOUS BILATERAL           Electronically signed by Teri Macedo MD on 5/28/2020 at 12:41 PM     Copy sent to Dr. Angelica Baldwin MD

## 2020-05-29 ENCOUNTER — HOSPITAL ENCOUNTER (OUTPATIENT)
Dept: INTERVENTIONAL RADIOLOGY/VASCULAR | Age: 75
Discharge: HOME | End: 2020-05-31
Payer: MEDICAID

## 2020-05-29 ENCOUNTER — HOSPITAL ENCOUNTER (OUTPATIENT)
Dept: PHYSICAL THERAPY | Age: 75
Setting detail: THERAPIES SERIES
Discharge: HOME | End: 2020-05-29
Payer: MEDICAID

## 2020-05-29 LAB
CULTURE: ABNORMAL
Lab: ABNORMAL
SPECIMEN DESCRIPTION: ABNORMAL

## 2020-05-29 PROCEDURE — 93970 EXTREMITY STUDY: CPT

## 2020-05-29 PROCEDURE — 97112 NEUROMUSCULAR REEDUCATION: CPT | Performed by: PHYSICAL THERAPIST

## 2020-05-29 NOTE — FLOWSHEET NOTE
strengthening, flexibility, endurance, ROM. (68287)  [] Reviewed/Progressed HEP activities related to improving balance, coordination, kinesthetic sense, posture, motor skill, proprioception.  (40120)    Manual Treatments:    [] Provided manual therapy to mobilize soft tissue/joints for the purpose of modulating pain, promoting relaxation,  increasing ROM, reducing/eliminating soft tissue swelling/inflammation/restriction, improving soft tissue extensibility. (28058)    Service Based Modalities:      Timed Code Treatment Minutes:   28' Neuro  Re-ed    Total Treatment Minutes:   29'    Treatment/Activity Tolerance:  [x] Patient tolerated treatment well [] Patient limited by fatique  [] Patient limited by pain  [] Patient limited by other medical complications  [] Other:     Prognosis: [x] Good [] Fair  [] Poor    Patient Requires Follow-up: [x] Yes  [] No      Goals:  Short term goals  Time Frame for Short term goals: 3 weeks   Short term goal 1: Initiate HEP   Short term goal 2: Perform CRM - met    Long term goals  Time Frame for Long term goals : 6 weeks  Long term goal 1: Indpendent in HEP   Long term goal 2: Patient to note no spinning with rolling in bed. Long term goal 3: Patient to note no nausea with bending or looking up/   Long term goal 4: Patient to note 50% improvement in dizziness.            Plan:   [x] Continue per plan of care [] Alter current plan (see comments)  [x] Plan of care initiated [] Hold pending MD visit [] Discharge  Plan for Next Session: Habituation HEP                                          Lenice Nageotte to assess for  Peripheral vs central vestibular issue    Electronically signed by:  Anna Alfaro

## 2020-05-29 NOTE — PLAN OF CARE
50% improvement in dizziness. Current Frequency/Duration:5/29/20 - 6/28/20  # Days per week: [] 1 day # Weeks: [] 1 week [x] 4 weeks      [x] 2 days? [] 2 weeks [] 5 weeks      [] 3 days   [] 3 weeks [] 6 weeks     Rehab Potential: [] Excellent [x] Good [] Fair  [] Poor     Goal Status:  [] Achieved [] Partially Achieved  [x] Not Achieved     Patient Status: [] Continue per initial plan of Care     [] Patient now discharged     [x] Additional visits requested, Please re-certify for additional visits:      Requested frequency/duration:  1-2X/week for4 weeks    Electronically signed by:  Maggie Su PT      If you have any questions or concerns, please don't hesitate to call.   Thank you for your referral.    Physician Signature:________________________________Date:__________________  By signing above, therapists plan is approved by physician

## 2020-06-04 ENCOUNTER — HOSPITAL ENCOUNTER (OUTPATIENT)
Dept: GENERAL RADIOLOGY | Age: 75
Discharge: HOME | End: 2020-06-06
Payer: MEDICAID

## 2020-06-04 ENCOUNTER — OFFICE VISIT (OUTPATIENT)
Dept: ORTHOPEDIC SURGERY | Age: 75
End: 2020-06-04
Payer: MEDICAID

## 2020-06-04 VITALS
DIASTOLIC BLOOD PRESSURE: 65 MMHG | HEIGHT: 65 IN | BODY MASS INDEX: 27.82 KG/M2 | SYSTOLIC BLOOD PRESSURE: 116 MMHG | WEIGHT: 167 LBS

## 2020-06-04 PROCEDURE — 1036F TOBACCO NON-USER: CPT | Performed by: FAMILY MEDICINE

## 2020-06-04 PROCEDURE — 1090F PRES/ABSN URINE INCON ASSESS: CPT | Performed by: FAMILY MEDICINE

## 2020-06-04 PROCEDURE — 73562 X-RAY EXAM OF KNEE 3: CPT

## 2020-06-04 PROCEDURE — 20610 DRAIN/INJ JOINT/BURSA W/O US: CPT | Performed by: FAMILY MEDICINE

## 2020-06-04 PROCEDURE — 1123F ACP DISCUSS/DSCN MKR DOCD: CPT | Performed by: FAMILY MEDICINE

## 2020-06-04 PROCEDURE — G8417 CALC BMI ABV UP PARAM F/U: HCPCS | Performed by: FAMILY MEDICINE

## 2020-06-04 PROCEDURE — 99203 OFFICE O/P NEW LOW 30 MIN: CPT | Performed by: FAMILY MEDICINE

## 2020-06-04 PROCEDURE — 4040F PNEUMOC VAC/ADMIN/RCVD: CPT | Performed by: FAMILY MEDICINE

## 2020-06-04 PROCEDURE — 99214 OFFICE O/P EST MOD 30 MIN: CPT

## 2020-06-04 PROCEDURE — G8400 PT W/DXA NO RESULTS DOC: HCPCS | Performed by: FAMILY MEDICINE

## 2020-06-04 PROCEDURE — G8427 DOCREV CUR MEDS BY ELIG CLIN: HCPCS | Performed by: FAMILY MEDICINE

## 2020-06-04 RX ORDER — TRIAMCINOLONE ACETONIDE 40 MG/ML
40 INJECTION, SUSPENSION INTRA-ARTICULAR; INTRAMUSCULAR ONCE
Status: COMPLETED | OUTPATIENT
Start: 2020-06-04 | End: 2020-06-04

## 2020-06-04 RX ORDER — BUPIVACAINE HYDROCHLORIDE 5 MG/ML
4 INJECTION, SOLUTION PERINEURAL ONCE
Status: COMPLETED | OUTPATIENT
Start: 2020-06-04 | End: 2020-06-04

## 2020-06-04 RX ADMIN — TRIAMCINOLONE ACETONIDE 40 MG: 40 INJECTION, SUSPENSION INTRA-ARTICULAR; INTRAMUSCULAR at 14:47

## 2020-06-04 RX ADMIN — BUPIVACAINE HYDROCHLORIDE 20 MG: 5 INJECTION, SOLUTION PERINEURAL at 14:46

## 2020-06-04 NOTE — PROGRESS NOTES
Relationship status: Not on file    Intimate partner violence     Fear of current or ex partner: Not on file     Emotionally abused: Not on file     Physically abused: Not on file     Forced sexual activity: Not on file   Other Topics Concern    Not on file   Social History Narrative    Not on file       Current Outpatient Medications   Medication Sig Dispense Refill    clotrimazole-betamethasone (LOTRISONE) 1-0.05 % cream Apply a thin film to the affected area 2 times daily. 1 Tube 0    ONETOUCH VERIO strip use 1 TEST STRIP to TEST BLOOD SUGAR twice a day 100 strip 3    blood glucose monitor kit and supplies Test 2 times a day & as needed for symptoms of irregular blood glucose. 1 kit 0    hydrocortisone 2.5 % cream Apply topically 2 times daily.  1 Tube 0    propranolol (INDERAL LA) 60 MG extended release capsule Take 1 capsule by mouth daily 30 capsule 5    fluticasone (FLONASE) 50 MCG/ACT nasal spray 1 spray by Each Nostril route daily 1 Bottle 5    levothyroxine (EUTHYROX) 88 MCG tablet Take 1 tablet by mouth Daily 30 tablet 11    losartan-hydrochlorothiazide (HYZAAR) 50-12.5 MG per tablet Take 1 tablet by mouth daily 30 tablet 11    meclizine (ANTIVERT) 25 MG tablet Take 1 tablet by mouth 3 times daily as needed for Dizziness 60 tablet 3    omeprazole (PRILOSEC) 20 MG delayed release capsule Take 1 capsule by mouth Daily 30 capsule 11    metFORMIN (GLUCOPHAGE) 500 MG tablet Take 1 tablet by mouth 2 times daily (with meals) 60 tablet 2    Carboxymethylcellul-Glycerin ([employer] LUBRICATING/DRY EYE) 0.5-0.9 % SOLN One drop to right eye 3-4 times per day as needed 1 Bottle 0    dimenhyDRINATE (DRAMAMINE) 50 MG tablet Take 50 mg by mouth nightly as needed      NONFORMULARY Take 500 tablets by mouth as needed (dizziness)      aspirin 325 MG tablet Take 325 mg by mouth daily      PARoxetine (PAXIL) 20 MG tablet Take 1 tablet by mouth daily 30 tablet 3     Current Facility-Administered Medications antalgic    PSYCH:  Good fund of knowledge and displays understanding of exam.    RADIOLOGY: Xr Knee Left (3 Views)    Result Date: 6/4/2020  1. No acute osseous abnormality 2. Moderate tricompartmental osteoarthritis of the right knee 3. Moderate tricompartmental osteoarthritis of the left knee     Xr Knee Right (3 Views)    Result Date: 6/4/2020  1. No acute osseous abnormality 2. Moderate tricompartmental osteoarthritis of the right knee 3. Moderate tricompartmental osteoarthritis of the left knee     IMPRESSION:     1. Primary osteoarthritis of both knees          PLAN:   We discussed some of the etiologies and natural histories of     ICD-10-CM    1. Primary osteoarthritis of both knees M17.0 triamcinolone acetonide (KENALOG-40) injection 40 mg     bupivacaine (MARCAINE) 0.5 % injection 20 mg     triamcinolone acetonide (KENALOG-40) injection 40 mg     bupivacaine (MARCAINE) 0.5 % injection 20 mg     UT ARTHROCENTESIS ASPIR&/INJ MAJOR JT/BURSA W/O US   . We discussed the various treatment alternatives including anti-inflammatory medications, physical therapy, injections, further imaging studies and as a last resort surgery. This point patient would benefit from some cortisone injections to help with her knee pain and one was given into each knee in the manner safe in chart patient tolerated procedure well follow-up with me otherwise as needed for this issue patient son voiced understanding agreement this plan  line was provided but patient and son declined    Return to clinic in No follow-ups on file. [place] Please be aware portions of this note were completed using voice recognition software and unforeseen errors may have occurred    Electronically signed by Quinton Erickson DO, FAOASM  on 6/4/20 at 2:30 PM EDT            Procedures    UT ARTHROCENTESIS ASPIR&/INJ MAJOR JT/BURSA W/O US     KNEE INJECTION PROCEDURE NOTE:  The patient was identified. The b/l knee was confirmed with the patient.   After a sterile prep with Betadine followed by an alcohol wipe the knee was injected using a bent knee lateral joint line approach with a mixture of 4 mL of 0.5% Marcaine and 40 mg of Kenalog. Patient tolerated the procedure well without post injection complications. I instructed the patient to call our office immediately if they have any swelling or increased pain at the injection site.

## 2020-06-05 ENCOUNTER — TELEPHONE (OUTPATIENT)
Dept: VASCULAR SURGERY | Age: 75
End: 2020-06-05

## 2020-06-05 ENCOUNTER — OFFICE VISIT (OUTPATIENT)
Dept: CARDIOLOGY | Age: 75
End: 2020-06-05
Payer: MEDICAID

## 2020-06-05 VITALS
HEART RATE: 82 BPM | SYSTOLIC BLOOD PRESSURE: 128 MMHG | HEIGHT: 65 IN | DIASTOLIC BLOOD PRESSURE: 72 MMHG | BODY MASS INDEX: 28.32 KG/M2 | WEIGHT: 170 LBS

## 2020-06-05 PROCEDURE — 1036F TOBACCO NON-USER: CPT | Performed by: INTERNAL MEDICINE

## 2020-06-05 PROCEDURE — G8427 DOCREV CUR MEDS BY ELIG CLIN: HCPCS | Performed by: INTERNAL MEDICINE

## 2020-06-05 PROCEDURE — 93005 ELECTROCARDIOGRAM TRACING: CPT | Performed by: INTERNAL MEDICINE

## 2020-06-05 PROCEDURE — G8400 PT W/DXA NO RESULTS DOC: HCPCS | Performed by: INTERNAL MEDICINE

## 2020-06-05 PROCEDURE — G8417 CALC BMI ABV UP PARAM F/U: HCPCS | Performed by: INTERNAL MEDICINE

## 2020-06-05 PROCEDURE — 4040F PNEUMOC VAC/ADMIN/RCVD: CPT | Performed by: INTERNAL MEDICINE

## 2020-06-05 PROCEDURE — 1123F ACP DISCUSS/DSCN MKR DOCD: CPT | Performed by: INTERNAL MEDICINE

## 2020-06-05 PROCEDURE — 99214 OFFICE O/P EST MOD 30 MIN: CPT

## 2020-06-05 PROCEDURE — 93010 ELECTROCARDIOGRAM REPORT: CPT | Performed by: INTERNAL MEDICINE

## 2020-06-05 PROCEDURE — 99203 OFFICE O/P NEW LOW 30 MIN: CPT | Performed by: INTERNAL MEDICINE

## 2020-06-05 PROCEDURE — 1090F PRES/ABSN URINE INCON ASSESS: CPT | Performed by: INTERNAL MEDICINE

## 2020-06-05 RX ORDER — ASPIRIN 81 MG/1
81 TABLET ORAL DAILY
COMMUNITY
End: 2020-06-30

## 2020-06-05 RX ORDER — TRIAMTERENE AND HYDROCHLOROTHIAZIDE 37.5; 25 MG/1; MG/1
1 TABLET ORAL DAILY
COMMUNITY
End: 2020-07-27 | Stop reason: SDUPTHER

## 2020-06-05 ASSESSMENT — ENCOUNTER SYMPTOMS
EYE DISCHARGE: 0
ABDOMINAL DISTENTION: 0
NAUSEA: 0
EYE ITCHING: 0
BACK PAIN: 0
VOMITING: 0
DIARRHEA: 0
SHORTNESS OF BREATH: 1

## 2020-06-08 ENCOUNTER — TELEPHONE (OUTPATIENT)
Dept: VASCULAR SURGERY | Age: 75
End: 2020-06-08

## 2020-06-09 ENCOUNTER — HOSPITAL ENCOUNTER (OUTPATIENT)
Dept: NON INVASIVE DIAGNOSTICS | Age: 75
Discharge: HOME | End: 2020-06-09
Payer: MEDICAID

## 2020-06-09 LAB
LV EF: 60 %
LVEF MODALITY: NORMAL

## 2020-06-09 PROCEDURE — 93270 REMOTE 30 DAY ECG REV/REPORT: CPT

## 2020-06-09 PROCEDURE — 93306 TTE W/DOPPLER COMPLETE: CPT

## 2020-06-09 PROCEDURE — 93271 ECG/MONITORING AND ANALYSIS: CPT

## 2020-06-10 ENCOUNTER — OFFICE VISIT (OUTPATIENT)
Dept: INTERNAL MEDICINE | Age: 75
End: 2020-06-10
Payer: MEDICAID

## 2020-06-10 VITALS
BODY MASS INDEX: 27.82 KG/M2 | HEART RATE: 76 BPM | HEIGHT: 65 IN | TEMPERATURE: 96.7 F | WEIGHT: 167 LBS | RESPIRATION RATE: 16 BRPM | DIASTOLIC BLOOD PRESSURE: 74 MMHG | SYSTOLIC BLOOD PRESSURE: 126 MMHG

## 2020-06-10 PROCEDURE — 4040F PNEUMOC VAC/ADMIN/RCVD: CPT | Performed by: INTERNAL MEDICINE

## 2020-06-10 PROCEDURE — G8400 PT W/DXA NO RESULTS DOC: HCPCS | Performed by: INTERNAL MEDICINE

## 2020-06-10 PROCEDURE — 1123F ACP DISCUSS/DSCN MKR DOCD: CPT | Performed by: INTERNAL MEDICINE

## 2020-06-10 PROCEDURE — G8417 CALC BMI ABV UP PARAM F/U: HCPCS | Performed by: INTERNAL MEDICINE

## 2020-06-10 PROCEDURE — 99214 OFFICE O/P EST MOD 30 MIN: CPT

## 2020-06-10 PROCEDURE — 1090F PRES/ABSN URINE INCON ASSESS: CPT | Performed by: INTERNAL MEDICINE

## 2020-06-10 PROCEDURE — 1036F TOBACCO NON-USER: CPT | Performed by: INTERNAL MEDICINE

## 2020-06-10 PROCEDURE — G8427 DOCREV CUR MEDS BY ELIG CLIN: HCPCS | Performed by: INTERNAL MEDICINE

## 2020-06-10 PROCEDURE — 99214 OFFICE O/P EST MOD 30 MIN: CPT | Performed by: INTERNAL MEDICINE

## 2020-06-10 RX ORDER — TRIAMCINOLONE ACETONIDE 1 MG/G
CREAM TOPICAL
Qty: 30 G | Refills: 0 | Status: SHIPPED | OUTPATIENT
Start: 2020-06-10

## 2020-06-10 ASSESSMENT — PATIENT HEALTH QUESTIONNAIRE - PHQ9
SUM OF ALL RESPONSES TO PHQ QUESTIONS 1-9: 0
SUM OF ALL RESPONSES TO PHQ QUESTIONS 1-9: 0
1. LITTLE INTEREST OR PLEASURE IN DOING THINGS: 0
2. FEELING DOWN, DEPRESSED OR HOPELESS: 0
SUM OF ALL RESPONSES TO PHQ9 QUESTIONS 1 & 2: 0

## 2020-06-11 ENCOUNTER — OFFICE VISIT (OUTPATIENT)
Dept: NEUROLOGY | Age: 75
End: 2020-06-11
Payer: MEDICAID

## 2020-06-11 ENCOUNTER — PRE-PROCEDURE TELEPHONE (OUTPATIENT)
Dept: PREADMISSION TESTING | Age: 75
End: 2020-06-11

## 2020-06-11 VITALS
BODY MASS INDEX: 27.99 KG/M2 | WEIGHT: 168 LBS | OXYGEN SATURATION: 97 % | DIASTOLIC BLOOD PRESSURE: 64 MMHG | SYSTOLIC BLOOD PRESSURE: 108 MMHG | HEART RATE: 77 BPM | HEIGHT: 65 IN

## 2020-06-11 PROBLEM — G89.29 CHRONIC NECK PAIN: Status: ACTIVE | Noted: 2020-06-11

## 2020-06-11 PROBLEM — R42 VERTIGO: Status: ACTIVE | Noted: 2020-06-11

## 2020-06-11 PROBLEM — F41.9 ANXIETY AND DEPRESSION: Status: ACTIVE | Noted: 2020-06-11

## 2020-06-11 PROBLEM — R73.9 HYPERGLYCEMIA: Status: ACTIVE | Noted: 2020-06-11

## 2020-06-11 PROBLEM — R29.818 POSITIVE ROMBERG TEST: Status: ACTIVE | Noted: 2020-06-11

## 2020-06-11 PROBLEM — R42 DIZZINESS: Status: ACTIVE | Noted: 2020-06-11

## 2020-06-11 PROBLEM — M54.2 CHRONIC NECK PAIN: Status: ACTIVE | Noted: 2020-06-11

## 2020-06-11 PROBLEM — F32.A ANXIETY AND DEPRESSION: Status: ACTIVE | Noted: 2020-06-11

## 2020-06-11 PROBLEM — G25.0 BENIGN ESSENTIAL TREMOR: Status: ACTIVE | Noted: 2020-06-11

## 2020-06-11 PROBLEM — R26.89 BALANCE PROBLEM: Status: ACTIVE | Noted: 2020-06-11

## 2020-06-11 PROBLEM — Z82.0 FAMILY HISTORY OF TREMOR: Status: ACTIVE | Noted: 2020-06-11

## 2020-06-11 PROCEDURE — 99205 OFFICE O/P NEW HI 60 MIN: CPT | Performed by: PSYCHIATRY & NEUROLOGY

## 2020-06-11 PROCEDURE — G8427 DOCREV CUR MEDS BY ELIG CLIN: HCPCS | Performed by: PSYCHIATRY & NEUROLOGY

## 2020-06-11 PROCEDURE — 1090F PRES/ABSN URINE INCON ASSESS: CPT | Performed by: PSYCHIATRY & NEUROLOGY

## 2020-06-11 PROCEDURE — 99214 OFFICE O/P EST MOD 30 MIN: CPT | Performed by: PSYCHIATRY & NEUROLOGY

## 2020-06-11 PROCEDURE — G8417 CALC BMI ABV UP PARAM F/U: HCPCS | Performed by: PSYCHIATRY & NEUROLOGY

## 2020-06-11 RX ORDER — PRIMIDONE 50 MG/1
TABLET ORAL
Qty: 30 TABLET | Refills: 1 | Status: SHIPPED | OUTPATIENT
Start: 2020-06-11 | End: 2020-06-30

## 2020-06-11 ASSESSMENT — ENCOUNTER SYMPTOMS
APNEA: 0
VISUAL CHANGE: 0
CHOKING: 0
CONSTIPATION: 0
SHORTNESS OF BREATH: 0
DIARRHEA: 0
EYE PAIN: 0
VOMITING: 0
EYE REDNESS: 0
EYE DISCHARGE: 0
ABDOMINAL DISTENTION: 0
ABDOMINAL PAIN: 0
PHOTOPHOBIA: 0
SINUS PRESSURE: 0
CHEST TIGHTNESS: 0
BOWEL INCONTINENCE: 0
SWOLLEN GLANDS: 0
FACIAL SWELLING: 0
VOICE CHANGE: 0
CHANGE IN BOWEL HABIT: 0
COUGH: 0
TROUBLE SWALLOWING: 0
BACK PAIN: 0
WHEEZING: 0
BLOOD IN STOOL: 0
NAUSEA: 0
SORE THROAT: 0
EYE ITCHING: 0
COLOR CHANGE: 0

## 2020-06-11 NOTE — PATIENT INSTRUCTIONS
[facility] NEUROLOGY    Due to the complex nature of our neurological testing, It is the policy of the Neurology Department not to release the results of your testing over the phone. Once all testing is completed and we have all the results back, Dr. Wendie Yang will then personally go over all the results with you and answer any questions that you may have during a follow up appointment. If you are unable to keep this appointment, please notify the [address] @ [phone], as soon as possible. Please bring your prescription bottles to all appointments. Advance Care Planning  People with COVID-19 may have no symptoms, mild symptoms, such as fever, cough, and shortness of breath or they may have more severe illness, developing severe and fatal pneumonia. As a result, Advance Care Planning with attention to naming a health care decision maker (someone you trust to make healthcare decisions for you if you could not speak for yourself) and sharing other health care preferences is important BEFORE a possible health crisis. Please contact your Primary Care Provider to discuss Advance Care Planning. Preventing the Spread of Coronavirus Disease 2019 in Homes and Residential Communities  For the most recent information go to [email or web address]    Prevention steps for People with confirmed or suspected COVID-19 (including persons under investigation) who do not need to be hospitalized  and   People with confirmed COVID-19 who were hospitalized and determined to be medically stable to go home    Your healthcare provider and public health staff will evaluate whether you can be cared for at home. If it is determined that you do not need to be hospitalized and can be isolated at home, you will be monitored by staff from your local or state health department.  You should follow the prevention steps below until a healthcare provider or local or

## 2020-06-11 NOTE — PROGRESS NOTES
AND  HER  SON      and other  available   medical  records   were  Also  reviewed. PATIENT     IS  Chinese  SPEAKING  AND  HER  SON    HELPED        IN    INTERPRETING    AND IN  COMMUNICATING     WITH  PATIENT            The  Duration,  Quality,  Severity,  Location,  Timing,  Context,  Modifying  Factors   Of   The   Chief   Complaint   And  Present  Illness       Was   Reviewed   In   [facility]   [facility].                                             PATIENT'S  MAIN  CONCERNS INCLUDE :                     1)      CHRONIC  MILD    TREMORS  BOTH  HANDS   FOR   7 -8    YEARS                                      MORE  SO  ON  LEFT  SIDE                                    -   ESSENTIAL  TREMORS                         2)       TREMORS  WORSENED  BY  ANXIETY   AND  ACTIVITY                            3)     FAMILY    H/O   TREMORS     -    FATHER                              4)     H/O   BRIEF  DIZZINESS    INTERMITTENTLY                                   -  ON  ASA   81  MG  DAILY                               5)     H/O       VERTIGO                                    BEING  FOLLOWED BY   ENT                                                                  6)       H/O     CHRONIC  MILD  NECK PAIN                                               7)     NUMBNESS    EXTREMITIES ,    MORE  SO                                      RIGHT   UPPER  EXTREMITY     FOR  ONE YEAR                                     D / D    MONONEUROPATHIES ,                                               PERIPHERAL  POLYNEUROPATHY                              8)     MILD   BALANCE  PROBLEMS                                    NO   H/O   FALLING                                   9)    MULTIPLE  CO  MORBID  MEDICAL CONDITIONS                                  BEING  FOLLOWED  BY   HER  PCP                              10)     H/O   CHRONIC  MILD  ANXIETY,   DEPRESSION                                          -  ON   PAXIL TO  FOLLOW  WITH  MENTAL  HEALTH PROFESSIONALS                               11)   VARIOUS  RISK   FACTORS   WERE  REVIEWED   AND   DISCUSSED. PATIENT   HAS  MULTIPLE   MEDICAL, NEUROLOGICAL                                     AND   MENTAL HEALTH   PROBLEMS . PATIENT'S   MANAGEMENT  IS  CHALLENGING.                                               *12)   IN  VIEW  OF  THE  PATIENT'S    MULTIPLE   NEUROLOGICAL                           SYMPTOMS  AND  CONCERNS    FOR  PROLONGED   DURATION,                           AND    MULTIPLE   CO MORBID  MEDICAL  CONDITIONS,                           PATIENT    NEEDS  NEURO  DIAGNOSTIC  EVALUATIONS                      FOR   ANY   NEUROLOGICAL  PATHOLOGIES    AND  OTHER                        CORRECTABLE   ETIOLOGIES;     AND                              PATIENT  REQUESTS   THE  SAME.                                     PRECIPITATING  FACTORS: including  fever/infection, exertion/relaxation, position change, stress, weather change,                        medications/alcohol, time of day/darkness/light  Are  absent                                                              MODIFYING  FACTORS:  fever/infection, exertion/relaxation, position change, stress, weather change,                        medications/alcohol, time of day/darkness/light  Are  absent             Patient   Indicates   The  Presence   And  The  Absence  Of  The  Following    Associated  And   Additional  Neurological    Symptoms:                                Balance  And coordination   problems  present           Gait problems     absent            Headaches      absent              Migraines           absent           Memory problemsabsent              Confusion        absent            Paresthesia   numbness          present           Seizures  And  Starring  Episodes           absent           Syncope,  Near  syncopal delayed. Speech is not rapid and pressured, slurred or tangential.         Behavior: Behavior is slowed. Behavior is not agitated, aggressive, withdrawn, hyperactive or combative. Behavior is cooperative. Thought Content: Thought content is not paranoid or delusional. Thought content does not include homicidal or suicidal ideation. Thought content does not include homicidal or suicidal plan. Cognition and Memory: Memory is not impaired. She does not exhibit impaired recent memory or impaired remote memory. Judgment: Judgment is not impulsive or inappropriate. NEUROLOGICALEXAMINATION :       A) MENTAL STATUS:                   Alert and  oriented  To time, place  And  Person. No Aphasia. No  Dysarthria. Able   To  Follow     SIMPLE   commands   without   Any  Difficulty. No right  To left confusion. Normal  Speech  And language function. Insight and  Judgment ,Fund  Of  Knowledge   within normal limits                Recent  And  Remote memory  within   normal limits                Attention &  Concentration are within   normal limits                                                 B) CRANIAL NERVES :      CN : Visual  Acuity  And  Visual fields  within normal limits               Fundi  Could  Not  Be  Could  Not  Be  Evaluated. 3,4,6 CN : Both  Pupils are   Reactive and  Equal.  Movements  Are  Intact. No  Nystagmus. No  JACKY. No  Afferent  Pupillary  Defect noted. 5 CN :  Normal  Facial sensations and Corneal  Reflexes           7 CN:  Normal  Facial  Symmetry  And  Strength. No facial  Weakness.            8 CN :  Hearing  Appears within normal limits          9, 10 CN: Normal   spontaneous, reflex   palate   movements         11 CN:   Normal  Shoulder  shrug and  strength         12 CN :   Normal  Tongue movements and  Tongue  In midline Oren Haley MD    Board Certified in  Neurology &  In  [facility] ([facility])      DISCLAIMER:   Although every effort was made to ensure the accuracy of this  electronictranscription, some errors in transcription may have occurred. GENERAL PATIENT INSTRUCTIONS:      A Healthy Lifestyle: Care Instructions   Your Care Instructions   A healthy lifestyle can help you feel good, stay at ahealthy weight, and have plenty of energy for both work and play. A healthy lifestyle is something you can share with your whole family.  A healthy lifestyle also can lower your risk for serious health problems, such ashigh blood pressure, heart disease, and diabetes.  You can follow a few steps listed below to improve your health and the health of your family.  Follow-up careis a key part of your treatment and safety. Be sure to make and go to all appointments, and call your doctor if you are having problems. Its also a good idea to know your test results and keep a list of the medicines you take.  How can you care for yourself at home?  Do not eat too much sugar, fat, or fast foods. You can still have dessert and treats nowand then. The goal is moderation.  Start small to improve your eating habits. Pay attention to portion sizes, drink less juice and soda pop, and eat more fruits and vegetables.  Eat a healthy amount of food. A 3-ounce serving of meat, for example, is about the size of a deck of cards. Fill the rest of your plate with vegetables and whole grains.  Limit theamount of soda and sports drinks you have every day. Drink more water when you are thirsty.  Eat at least 5 servings of fruits and vegetables every day. It may seem like a lot, but it is not hard to reach this goal. Aserving or helping is 1 piece of fruit, 1 cup of vegetables, or 2 cups of leafy, raw vegetables.  Have an apple or some carrot sticks as an afternoon snack instead of a candy bar. Try to have fruits and/or vegetables at everymeal.   Make exercise part of your daily routine. You may want to start with simple activities, such as walking, bicycling, or slow swimming. Try dannie active 30 to 60 minutes every day. You do not need to do all 30 to 60 minutes all at once. For example, you can exercise 3 times a day for 10 or 20 minutes. Moderate exercise is safe for most people, but it is always agood idea to talk to your doctor before starting an exercise program.   Keep moving. Lyn[place] Kaiser the lawn, work in the garden, or "[employer]. Take the stairs instead of the elevator at work.  If you smoke, quit. Peoplewho smoke have an increased risk for heart attack, stroke, cancer, and other lung illnesses. Quitting is hard, but there are ways to boost your chance of quitting tobacco for good.  Use nicotine gum, patches, or lozenges.  Ask your doctor about stop-smoking programs and medicines.  Keep trying.  In addition to reducing your risk of diseases in the future, you will notice some benefits soon after you stop using tobacco. If you have shortness of breath or asthma symptoms, they will likely getbetter within a few weeks after you quit.  Limit how much alcohol you drink. Moderate amounts of alcohol (up to 2 drinks a day for men, 1drink a day for women) are okay. But drinking too much can lead to liver problems, high blood pressure, and other health problems.  health   If you have a family, there are many things you can do together to improve your health.  Eat meals together as a family as often as possible.  Eat healthy foods. This includes fruits, vegetables, lean meats and dairy, and whole grains.  Include your family in your fitness plan. Most peoplethink of activities such as jogging or tennis as the way to fitness, but there are many ways you and your family can be more active. Anything that makes you breathe hard and gets your heart pumping is exercise.  Here are

## 2020-06-16 ENCOUNTER — HOSPITAL ENCOUNTER (OUTPATIENT)
Dept: PREADMISSION TESTING | Age: 75
Setting detail: SPECIMEN
Discharge: HOME | End: 2020-06-20
Payer: MEDICAID

## 2020-06-16 PROCEDURE — U0004 COV-19 TEST NON-CDC HGH THRU: HCPCS

## 2020-06-17 LAB
SARS-COV-2, PCR: NOT DETECTED
SARS-COV-2, RAPID: NORMAL
SARS-COV-2: NORMAL
SOURCE: NORMAL

## 2020-06-18 ENCOUNTER — TELEPHONE (OUTPATIENT)
Dept: PRIMARY CARE CLINIC | Age: 75
End: 2020-06-18

## 2020-06-19 ENCOUNTER — HOSPITAL ENCOUNTER (OUTPATIENT)
Dept: MAMMOGRAPHY | Age: 75
Discharge: HOME | End: 2020-06-21
Payer: MEDICAID

## 2020-06-19 PROCEDURE — 77063 BREAST TOMOSYNTHESIS BI: CPT

## 2020-06-23 ENCOUNTER — HOSPITAL ENCOUNTER (OUTPATIENT)
Dept: NEUROLOGY | Age: 75
Discharge: HOME | End: 2020-06-23
Payer: MEDICAID

## 2020-06-23 ENCOUNTER — TELEPHONE (OUTPATIENT)
Dept: OBGYN | Age: 75
End: 2020-06-23

## 2020-06-23 PROCEDURE — 95912 NRV CNDJ TEST 11-12 STUDIES: CPT

## 2020-06-23 PROCEDURE — 95886 MUSC TEST DONE W/N TEST COMP: CPT

## 2020-06-23 RX ORDER — CLOTRIMAZOLE AND BETAMETHASONE DIPROPIONATE 10; .64 MG/G; MG/G
CREAM TOPICAL
Qty: 2 TUBE | Refills: 0 | Status: SHIPPED | OUTPATIENT
Start: 2020-06-23 | End: 2020-09-28 | Stop reason: SDUPTHER

## 2020-06-23 NOTE — TELEPHONE ENCOUNTER
Son came to window and states that she is having the same symptoms and needs the same cream sent in please

## 2020-06-24 ENCOUNTER — HOSPITAL ENCOUNTER (OUTPATIENT)
Dept: NEUROLOGY | Age: 75
Discharge: HOME | End: 2020-06-24
Payer: MEDICAID

## 2020-06-24 PROCEDURE — 93886 INTRACRANIAL COMPLETE STUDY: CPT

## 2020-06-24 PROCEDURE — 93892 TCD EMBOLI DETECT W/O INJ: CPT

## 2020-06-24 NOTE — PROCEDURES
potentials  appear within normal limits bilaterally. Compound muscle action potentials of the right median nerve shows low  amplitude at wrist, borderline at the elbow with normal distal latency,  conduction velocity. Compound muscle action potentials of the left median nerve shows normal  amplitude, distal latency, conduction velocities. Compound muscle action potentials of the right and left ulnar nerves  shows low amplitudes at wrist and elbows bilaterally more so on the left  side with normal distal latency, conduction velocities bilaterally. Proximal conductions as measured by the F responses were normal in both  median and ulnar nerves bilaterally. Nerve  Conductions   Results  Were  Personally  Reviewed and  Analysed. Abnormal  Nerve  Conductions  Were  Personally  Repeated,  Verified, reconfirmed  And  Updated as needed  appropriately. Please    See   Wave  Forms   And    Details  Of     Nerve  Conduction   Studies   For  Additional  Information          Extensive   Needle  Electromyography  Was personally  Performed  In  Both      Upper   Extremities    In  The  Following  Muscles :    Deltoid,  Biceps  Brachii,  Triceps . Pronator  Teres,  Flexor Carpi Ulnaris,    Ext. Digitorum Communis,  FDI (Hand)      Extensive  Needle  Electromyography  Shows  No   Abnormality with :      A) Normal  insertional  Activity. There  Is  Absence  Of   P  Waves,  Fibrillations,  Fasciculations and        Other  Spontaneous   Activity. B) Voluntary  Motor unit  Potentials    Show :    Normal  Effort,  Recruitment, amplitude,  Duration. No Polyphasia  Noted. IMPRESSION:        1. There is electrodiagnostic evidence of mild median mononeuropathy at    wrist (carpal tunnel syndrome) bilaterally more so on the right side. 2.  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       3.  There is no definite electrodiagnostic evidence of lower cervical    radiculopathy, plexopathy, peripheral polyneuropathy involving examined    both upper extremities. Further clinical correlation and followup recommended.           Mendez Chapin MD  Board Certified Neurologist    D: 06/23/2020 15:06:33       T: 06/23/2020 15:29:31     [id number]  Job#: [id number]     Doc#: [id number]    CC:  Avtar Noland MD

## 2020-06-25 ENCOUNTER — OFFICE VISIT (OUTPATIENT)
Dept: VASCULAR SURGERY | Age: 75
End: 2020-06-25
Payer: MEDICAID

## 2020-06-25 VITALS
HEIGHT: 65 IN | DIASTOLIC BLOOD PRESSURE: 66 MMHG | WEIGHT: 165 LBS | BODY MASS INDEX: 27.49 KG/M2 | SYSTOLIC BLOOD PRESSURE: 126 MMHG | HEART RATE: 66 BPM

## 2020-06-25 PROCEDURE — 99214 OFFICE O/P EST MOD 30 MIN: CPT

## 2020-06-25 PROCEDURE — G8427 DOCREV CUR MEDS BY ELIG CLIN: HCPCS | Performed by: SURGERY

## 2020-06-25 PROCEDURE — 99213 OFFICE O/P EST LOW 20 MIN: CPT | Performed by: SURGERY

## 2020-06-25 PROCEDURE — 4040F PNEUMOC VAC/ADMIN/RCVD: CPT | Performed by: SURGERY

## 2020-06-25 PROCEDURE — 1123F ACP DISCUSS/DSCN MKR DOCD: CPT | Performed by: SURGERY

## 2020-06-25 PROCEDURE — 1036F TOBACCO NON-USER: CPT | Performed by: SURGERY

## 2020-06-25 PROCEDURE — G8400 PT W/DXA NO RESULTS DOC: HCPCS | Performed by: SURGERY

## 2020-06-25 PROCEDURE — G8417 CALC BMI ABV UP PARAM F/U: HCPCS | Performed by: SURGERY

## 2020-06-25 PROCEDURE — 1090F PRES/ABSN URINE INCON ASSESS: CPT | Performed by: SURGERY

## 2020-06-25 NOTE — PROGRESS NOTES
Component Value Date    WBC 6.9 04/28/2020    HGB 12.2 04/28/2020    HCT 37.2 04/28/2020    MCV 89.4 04/28/2020     04/28/2020     Lab Results   Component Value Date     04/28/2020    K 4.5 04/28/2020     04/28/2020    CO2 27 04/28/2020    BUN 24 04/28/2020    CREATININE 1.06 04/28/2020    GLUCOSE 120 04/28/2020    CALCIUM 9.0 04/28/2020     Lab Results   Component Value Date    ALKPHOS 58 04/28/2020    ALT 20 04/28/2020    AST 19 04/28/2020    PROT 7.3 04/28/2020    BILITOT 0.37 04/28/2020    LABALBU 4.4 04/28/2020     No results found for: LACTA  No results found for: AMYLASE  Lab Results   Component Value Date    LIPASE 46 11/06/2015     Lab Results   Component Value Date    INR 0.9 11/23/2015         Assessment:  3 65-year-old female with a tender varicose vein of the left lateral leg    1. Varicose veins of left lower extremity with pain        Plan:   1. Her ultrasound shows multilevel venous reflux including the saphenofemoral junctions bilaterally and 6 mm proximal great saphenous veins. 2. She will be scheduled for endovenous ablation of her left great saphenous vein with phlebectomies in the upcoming weeks. 3. I think she would be a good candidate for [facility] if approved by her insurance. No orders of the defined types were placed in this encounter.           Electronically signed by Amanuel Salcido MD on 6/25/2020 at 11:19 AM     Copy sent to Dr. Farzad Jordan MD

## 2020-06-29 ENCOUNTER — HOSPITAL ENCOUNTER (OUTPATIENT)
Dept: INTERVENTIONAL RADIOLOGY/VASCULAR | Age: 75
Discharge: HOME | End: 2020-07-01
Payer: MEDICAID

## 2020-06-29 ENCOUNTER — HOSPITAL ENCOUNTER (OUTPATIENT)
Dept: CT IMAGING | Age: 75
Discharge: HOME | End: 2020-07-01
Payer: MEDICAID

## 2020-06-29 PROCEDURE — 70450 CT HEAD/BRAIN W/O DYE: CPT

## 2020-06-29 PROCEDURE — 93880 EXTRACRANIAL BILAT STUDY: CPT

## 2020-07-01 ENCOUNTER — PRE-PROCEDURE TELEPHONE (OUTPATIENT)
Dept: PREADMISSION TESTING | Age: 75
End: 2020-07-01

## 2020-07-03 ENCOUNTER — HOSPITAL ENCOUNTER (OUTPATIENT)
Dept: PREADMISSION TESTING | Age: 75
Setting detail: SPECIMEN
Discharge: HOME | End: 2020-07-07
Payer: MEDICAID

## 2020-07-03 PROCEDURE — U0004 COV-19 TEST NON-CDC HGH THRU: HCPCS

## 2020-07-04 LAB
SARS-COV-2, PCR: NOT DETECTED
SARS-COV-2, RAPID: NORMAL
SARS-COV-2: NORMAL
SOURCE: NORMAL

## 2020-07-05 ENCOUNTER — TELEPHONE (OUTPATIENT)
Dept: PRIMARY CARE CLINIC | Age: 75
End: 2020-07-05

## 2020-07-10 ENCOUNTER — OFFICE VISIT (OUTPATIENT)
Dept: NEUROLOGY | Age: 75
End: 2020-07-10
Payer: MEDICAID

## 2020-07-10 VITALS
OXYGEN SATURATION: 98 % | HEIGHT: 65 IN | TEMPERATURE: 96.9 F | BODY MASS INDEX: 27.76 KG/M2 | DIASTOLIC BLOOD PRESSURE: 80 MMHG | HEART RATE: 74 BPM | SYSTOLIC BLOOD PRESSURE: 134 MMHG | WEIGHT: 166.6 LBS

## 2020-07-10 PROBLEM — G56.03 BILATERAL CARPAL TUNNEL SYNDROME: Status: ACTIVE | Noted: 2020-07-10

## 2020-07-10 PROBLEM — G56.23 ULNAR NEUROPATHY OF BOTH UPPER EXTREMITIES: Status: ACTIVE | Noted: 2020-07-10

## 2020-07-10 PROCEDURE — 1036F TOBACCO NON-USER: CPT | Performed by: PSYCHIATRY & NEUROLOGY

## 2020-07-10 PROCEDURE — 99215 OFFICE O/P EST HI 40 MIN: CPT | Performed by: PSYCHIATRY & NEUROLOGY

## 2020-07-10 PROCEDURE — G8400 PT W/DXA NO RESULTS DOC: HCPCS | Performed by: PSYCHIATRY & NEUROLOGY

## 2020-07-10 PROCEDURE — 99213 OFFICE O/P EST LOW 20 MIN: CPT | Performed by: PSYCHIATRY & NEUROLOGY

## 2020-07-10 PROCEDURE — 1123F ACP DISCUSS/DSCN MKR DOCD: CPT | Performed by: PSYCHIATRY & NEUROLOGY

## 2020-07-10 PROCEDURE — G8417 CALC BMI ABV UP PARAM F/U: HCPCS | Performed by: PSYCHIATRY & NEUROLOGY

## 2020-07-10 PROCEDURE — 4040F PNEUMOC VAC/ADMIN/RCVD: CPT | Performed by: PSYCHIATRY & NEUROLOGY

## 2020-07-10 PROCEDURE — G8427 DOCREV CUR MEDS BY ELIG CLIN: HCPCS | Performed by: PSYCHIATRY & NEUROLOGY

## 2020-07-10 PROCEDURE — 1090F PRES/ABSN URINE INCON ASSESS: CPT | Performed by: PSYCHIATRY & NEUROLOGY

## 2020-07-10 ASSESSMENT — ENCOUNTER SYMPTOMS
WHEEZING: 0
FACIAL SWELLING: 0
SHORTNESS OF BREATH: 0
VISUAL CHANGE: 0
EYE PAIN: 0
COUGH: 0
ABDOMINAL PAIN: 0
BOWEL INCONTINENCE: 0
PHOTOPHOBIA: 0
CHOKING: 0
VOICE CHANGE: 0
CONSTIPATION: 0
NAUSEA: 0
COLOR CHANGE: 0
SINUS PRESSURE: 0
APNEA: 0
EYE DISCHARGE: 0
SWOLLEN GLANDS: 0
BACK PAIN: 0
EYE ITCHING: 0
CHANGE IN BOWEL HABIT: 0
CHEST TIGHTNESS: 0
EYE REDNESS: 0
SORE THROAT: 0
ABDOMINAL DISTENTION: 0
DIARRHEA: 0
BLOOD IN STOOL: 0
VOMITING: 0
TROUBLE SWALLOWING: 0

## 2020-07-10 NOTE — PROGRESS NOTES
[facility]  Neurology  [address] [place] [address]  GTDUT:[phone]   Fax: [phone]    SUBJECTIVE:     PATIENT ID:  Carlos Atkinson is a  RIGHT    HANDED 67 y.o. female. Neurologic Problem   The patient's primary symptoms include focal sensory loss and a loss of balance. The patient's pertinent negatives include no altered mental status, clumsiness, focal weakness, memory loss, near-syncope, slurred speech, syncope, visual change or weakness. Primary symptoms comment: TREMORS  BOTH  HANDS. This is a chronic problem. Episode onset: MORE  THAN     2-3  YEARS. The problem is unchanged. There was no focality noted. Associated symptoms include dizziness, light-headedness and neck pain. Pertinent negatives include no abdominal pain, auditory change, aura, back pain, bladder incontinence, bowel incontinence, chest pain, confusion, diaphoresis, fatigue, fever, headaches, nausea, palpitations, shortness of breath, vertigo or vomiting. Past treatments include bed rest and sleep. The treatment provided no relief. Her past medical history is significant for mood changes. There is no history of a bleeding disorder, a clotting disorder, a CVA, dementia, head trauma or liver disease. Dizziness   This is a recurrent (VERTIGO ) problem. The current episode started more than 1 year ago. The problem occurs intermittently. The problem has been waxing and waning. Associated symptoms include neck pain. Pertinent negatives include no abdominal pain, arthralgias, change in bowel habit, chest pain, chills, congestion, coughing, diaphoresis, fatigue, fever, headaches, joint swelling, myalgias, nausea, numbness, rash, sore throat, swollen glands, urinary symptoms, vertigo, visual change, vomiting or weakness. The symptoms are aggravated by bending and exertion. She has tried rest and sleep (MEDICATION) for the symptoms. The treatment provided mild relief.                History obtained from  [facility]  FOLLOWED  BY   HER  PCP                              10)     H/O   CHRONIC  MILD  ANXIETY,   DEPRESSION                                          -  ON   PAXIL                                            TO  FOLLOW  WITH  MENTAL  HEALTH PROFESSIONALS                                                                           11)         PATIENT     HAD  NEURO  DIAGNOSTIC  EVALUATIONS  IN  June 2020                                   A)  CT   HEAD   CAROTID  DOPPLER,   LABS     SHOWED                                 NO SIGNIFICANT  ABNORMALITIES                                   B)     EMG/  NC  STUDIES      SHOWED                                             -    MILD  BILATERAL  CARPAL  TUNNEL  SYNDROME                                             -  MODERATE  ULNAR  NEUROPATHY   BILATERALLY                                                      PATIENT  ADVISED  TO  WEAR  WRIST  BRACES                                                     TO  AVOID   PRESSURE  OVER  ULNAR  ASPECTS   OF  FOREARMS                                                             AND  ELBOWS                                                -   ABOVE  REVIEWED    WITH  PATIENT    AND  HER  SON   IN                                DETAIL. 12)   VARIOUS  RISK   FACTORS   WERE  REVIEWED   AND   DISCUSSED. PATIENT   HAS  MULTIPLE   MEDICAL, NEUROLOGICAL                                     AND   MENTAL HEALTH   PROBLEMS . PATIENT'S   MANAGEMENT  IS  CHALLENGING.                            13)     PATIENT'S  SON  INDICATED    THAT  SHE IS  GOING  BACK  TO                                      [place] . PATIENT    ADVISED    TO    FOLLOW  WITH                                           LOCAL  PROVIDERS.                                   PRECIPITATING  FACTORS: including  fever/infection, exertion/relaxation, position change, stress, weather change, Systems   And    Please   Refer   To    Them for   Additional    Information. Any components  That are either  Unobtainable  Or  Limited  In   HPI, ROS  And/or PFSH   Are                   Due   ToPatient's  Medical  Problems,  Clinical  Condition   and/or lack of                                other    Alternate   resources. RECORDS   REVIEWED:    historical medical records       INFORMATION   REVIEWED:     MEDICAL   HISTORY,SURGICAL   HISTORY,     MEDICATIONS   LIST,   ALLERGIES AND  DRUG  INTOLERANCES,       FAMILY   HISTORY,  SOCIAL  HISTORY,      PROBLEM  LIST   FOR  PATIENT  CARE   COORDINATION      Past Medical History:   Diagnosis Date    Chronic kidney disease     family denies    Diabetes mellitus ([address]     borderline diabetic    HTN (hypertension)          Past Surgical History:   Procedure Laterality Date    EXTERNAL EAR SURGERY      20 years ago         Current Outpatient Medications   Medication Sig Dispense Refill    metFORMIN (GLUCOPHAGE) 500 MG tablet take 1 tablet by mouth twice a day with food 180 tablet 3    triamterene-hydroCHLOROthiazide (MAXZIDE-25) 37.5-25 MG per tablet Take 1 tablet by mouth daily      ONETOUCH VERIO strip use 1 TEST STRIP to TEST BLOOD SUGAR twice a day 100 strip 3    blood glucose monitor kit and supplies Test 2 times a day & as needed for symptoms of irregular blood glucose.  1 kit 0    propranolol (INDERAL LA) 60 MG extended release capsule Take 1 capsule by mouth daily 30 capsule 5    fluticasone (FLONASE) 50 MCG/ACT nasal spray 1 spray by Each Nostril route daily 1 Bottle 5    levothyroxine (EUTHYROX) 88 MCG tablet Take 1 tablet by mouth Daily 30 tablet 11    meclizine (ANTIVERT) 25 MG tablet Take 1 tablet by mouth 3 times daily as needed for Dizziness 60 tablet 3    omeprazole (PRILOSEC) 20 MG delayed release capsule Take 1 capsule by mouth Daily 30 capsule 11    dimenhyDRINATE (DRAMAMINE) 50 MG tablet Take 50 mg by mouth nightly as needed      clotrimazole-betamethasone (LOTRISONE) 1-0.05 % cream Apply a thin film to the affected area 2 times daily. (Patient not taking: Reported on 7/10/2020) 2 Tube 0    triamcinolone (KENALOG) 0.1 % cream Apply topically 2 times daily. (Patient not taking: Reported on 7/10/2020) 30 g 0    hydrocortisone 2.5 % cream Apply topically 2 times daily. (Patient not taking: Reported on 7/10/2020) 1 Tube 0     No current facility-administered medications for this visit. No Known Allergies      Family History   Problem Relation Age of Onset    Cataracts Neg Hx     Diabetes Neg Hx     Glaucoma Neg Hx          Social History     Socioeconomic History    Marital status:       Spouse name: Not on file    Number of children: Not on file    Years of education: Not on file    Highest education level: Not on file   Occupational History    Not on file   Social Needs    Financial resource strain: Not on file    Food insecurity     Worry: Not on file     Inability: Not on file    Transportation needs     Medical: Not on file     Non-medical: Not on file   Tobacco Use    Smoking status: Never Smoker    Smokeless tobacco: Never Used   Substance and Sexual Activity    Alcohol use: Not Currently     Frequency: Never    Drug use: No    Sexual activity: Not Currently     Partners: Male   Lifestyle    Physical activity     Days per week: Not on file     Minutes per session: Not on file    Stress: Not on file   Relationships    Social connections     Talks on phone: Not on file     Gets together: Not on file     Attends [employer] service: Not on file     Active member of club or organization: Not on file     Attends meetings of clubs or organizations: Not on file     Relationship status: Not on file    Intimate partner violence     Fear of current or ex partner: Not on file     Emotionally abused: Not on file     Physically abused: Not on file     Forced sexual activity: Not on file   Other Topics Concern    Not on file   Social History Narrative    Not on file       Vitals:    07/10/20 1109   BP: 134/80   Pulse: 74   Temp: 96.9 °F (36.1 °C)   SpO2: 98%         Wt Readings from Last 3 Encounters:   07/10/20 166 lb 9.6 oz (75.6 kg)   06/25/20 165 lb (74.8 kg)   06/11/20 168 lb (76.2 kg)         BP Readings from Last 3 Encounters:   07/10/20 134/80   06/25/20 126/66   06/11/20 108/64       Hematology and Coagulation  Lab Results   Component Value Date    WBC 6.9 04/28/2020    RBC 4.16 04/28/2020    HGB 12.2 04/28/2020    HCT 37.2 04/28/2020    MCV 89.4 04/28/2020    MCH 29.3 04/28/2020    MCHC 32.8 04/28/2020    RDW 13.9 04/28/2020     04/28/2020    MPV 11.3 04/28/2020       Chemistries  Lab Results   Component Value Date     04/28/2020    K 4.5 04/28/2020     04/28/2020    CO2 27 04/28/2020    BUN 24 04/28/2020    CREATININE 1.06 04/28/2020    CALCIUM 9.0 04/28/2020    PROT 7.3 04/28/2020    LABALBU 4.4 04/28/2020    BILITOT 0.37 04/28/2020    ALKPHOS 58 04/28/2020    AST 19 04/28/2020    ALT 20 04/28/2020     Lab Results   Component Value Date    ALKPHOS 58 04/28/2020    ALT 20 04/28/2020    AST 19 04/28/2020    PROT 7.3 04/28/2020    BILITOT 0.37 04/28/2020    LABALBU 4.4 04/28/2020     Lab Results   Component Value Date    BUN 24 04/28/2020    CREATININE 1.06 04/28/2020     Lab Results   Component Value Date    CALCIUM 9.0 04/28/2020    MG 2.2 11/06/2015     Lab Results   Component Value Date    AST 19 04/28/2020    ALT 20 04/28/2020         Lab Results   Component Value Date    [id number] 348 04/28/2020           Review of Systems   Constitutional: Negative for appetite change, chills, diaphoresis, fatigue, fever and unexpected weight change. HENT: Negative for congestion, dental problem, drooling, ear discharge, ear pain, facial swelling, hearing loss, mouth sores, nosebleeds, postnasal drip, sinus pressure, sore throat, tinnitus, trouble swallowing and voice change.     Eyes: of motion. No neck rigidity or muscular tenderness. Thyroid: No thyroid mass or thyromegaly. Vascular: No carotid bruit. Trachea: No tracheal deviation. Meningeal: Brudzinski's sign and Kernig's sign absent. Cardiovascular:      Rate and Rhythm: Normal rate and regular rhythm. Pulmonary:      Effort: Pulmonary effort is normal.   Musculoskeletal: Normal range of motion. General: No tenderness. Skin:     General: Skin is warm. Coloration: Skin is not pale. Findings: No erythema or rash. Nails: There is no clubbing. Psychiatric:         Attention and Perception: She is attentive. Mood and Affect: Mood is anxious. Mood is not depressed. Affect is not labile, blunt or inappropriate. Speech: She is communicative. Speech is delayed. Speech is not rapid and pressured, slurred or tangential.         Behavior: Behavior is slowed. Behavior is not agitated, aggressive, withdrawn, hyperactive or combative. Behavior is cooperative. Thought Content: Thought content is not paranoid or delusional. Thought content does not include homicidal or suicidal ideation. Thought content does not include homicidal or suicidal plan. Cognition and Memory: Memory is not impaired. She does not exhibit impaired recent memory or impaired remote memory. Judgment: Judgment is not impulsive or inappropriate. NEUROLOGICALEXAMINATION :       A) MENTAL STATUS:                   Alert and  oriented  To time, place  And  Person. No Aphasia. No  Dysarthria. Able   To  Follow     SIMPLE   commands   without   Any  Difficulty. No right  To left confusion. Normal  Speech  And language function.                    Insight and  Judgment ,Fund  Of  Knowledge   within normal limits                Recent  And  Remote memory  within   normal limits                Attention &  Concentration are within   normal limits                                                 B) CRANIAL NERVES :      CN : Visual  Acuity  And  Visual fields  within normal limits               Fundi  Could  Not  Be  Could  Not  Be  Evaluated. 3,4,6 CN : Both  Pupils are   Reactive and  Equal.  Movements  Are  Intact. No  Nystagmus. No  JACKY. No  Afferent  Pupillary  Defect noted. 5 CN :  Normal  Facial sensations and Corneal  Reflexes           7 CN:  Normal  Facial  Symmetry  And  Strength. No facial  Weakness. 8 CN :  Hearing  Appears within normal limits          9, 10 CN: Normal   spontaneous, reflex   palate   movements         11 CN:   Normal  Shoulder  shrug and  strength         12 CN :   Normal  Tongue movements and  Tongue  In midline                        No tongue   Fasciculations or atrophy       C) MOTOR  EXAM:                 Strength  In upper  And  Lower   extremities   within   normal limits               No  Drift. No  Atrophy               Rapid   alternating  And  repetitions  Movements  within   normal limits               Muscle  Tone  In upper  And  Lower  Extremities  normal                No rigidity. No  Spasticity. Bradykinesia   absent               No  Asterixis. Sustention  Tremor , Resting   Tremor   absent                    No   other  Abnormal  Movements noted           D) SENSORY :               Light   touch, pinprick,   position  And  Vibration    DECREASED        E) REFLEXES:                   Deep  Tendon  Reflexes     DECREASED                    No  pathological  Reflexes  Bilaterally.                                   F) COORDINATION  AND  GAIT :                                Station and  Gait  normal                              Romberg 's test   POSITIVE                            Ataxia negative          ASSESSMENT:        Patient Active Problem List   Diagnosis    Chest pain    Hypertension    CKD (chronic kidney disease) stage 3, GFR 30-59 ml/min (HCC)    Nausea    Hypothyroidism    Varicose veins of left lower extremity with pain    Chronic neck pain    Vertigo    Positive Romberg test    Family history of tremor    Benign essential tremor    Anxiety and depression    Balance problem    Dizziness    Hyperglycemia     CT OF THE HEAD WITHOUT CONTRAST  6/29/2020 2:55 pm       TECHNIQUE:   CT of the head was performed without the administration of intravenous   contrast. Dose modulation, iterative reconstruction, and/or weight based   adjustment of the mA/kV was utilized to reduce the radiation dose to as low   as reasonably achievable.       COMPARISON:   None.       HISTORY:   ORDERING SYSTEM PROVIDED HISTORY: Dizziness   TECHNOLOGIST PROVIDED HISTORY:   dizziness   Reason for Exam: C/o dizziness, problems with balance   Acuity: Acute   Type of Exam: Initial       FINDINGS:   BRAIN/VENTRICLES: There is no acute intracranial hemorrhage, mass effect or   midline shift.  No abnormal extra-axial fluid collection.  The gray-white   differentiation is maintained without evidence of an acute infarct.  There is   no evidence of hydrocephalus.       ORBITS: The visualized portion of the orbits demonstrate no acute abnormality.       SINUSES: Peripheral mucosal thickening of the paranasal sinuses.  Status post   right mastoidectomy.       SOFT TISSUES/SKULL:  No acute abnormality of the visualized skull or soft   tissues.           Impression   No acute intracranial abnormality.         ULTRASOUND EVALUATION OF THE CAROTID ARTERIES       6/29/2020       COMPARISON:   None.       HISTORY:   ORDERING SYSTEM PROVIDED HISTORY: Benign essential tremor   TECHNOLOGIST PROVIDED HISTORY:   dizziness   Reason for Exam: dizziness   Acuity: Acute   Type of Exam: Initial   Relevant Medical/Surgical History: HTN       FINDINGS:       RIGHT:       The right common carotid artery demonstrates peak systolic velocities of 77,   66, 42 cm/sec in the proximal, mid and distal segments respectively.       The right internal carotid artery demonstrates the systolic velocities of 49,   44, 64 cm/sec in the proximal, mid and distal segments respectively.       The external carotid artery is patent.  The vertebral artery demonstrates   normal antegrade flow.       No evidence of focal atherosclerotic plaque.       ICA/CCA ratio of 0.7.           LEFT:       The left common carotid artery demonstrates peak systolic velocities of 603,   94, 81 cm/sec in the proximal, mid and distal segments respectively.       The left internal carotid artery demonstrates the systolic velocities of 40,   74, 75 cm/sec in the proximal, mid and distal segments respectively.       The external carotid artery is patent.  The vertebral artery demonstrates   normal antegrade flow.       No evidence of focal atherosclerotic plaque.       ICA/CCA ratio of 0.4.           Impression   The right internal carotid artery demonstrates 0-50% stenosis .       The left internal carotid artery demonstrates 0-50% stenosis .       Bilateral vertebral arteries are patent with flow in the normal direction. VISITING DIAGNOSIS:      ICD-10-CM    1. Benign essential tremor G25.0    2. Chronic neck pain M54.2     G89.29    3. Anxiety and depression F41.9     F32.9    4. Hypothyroidism, unspecified type E03.9    5. Balance problem R26.89    6. Vertigo R42    7. Positive Romberg test R29.818    8. Dizziness R42    9. Hypertension, unspecified type I10    10. Hyperglycemia R73.9    11. Family history of tremor Z82.0    12. CKD (chronic kidney disease) stage 3, GFR 30-59 ml/min (HCC) N18.3    13.  Varicose veins of left lower extremity with pain I83.812               CONCERNS   &   INCREASED   RISK   FOR        * TIA,  CEREBRO  VASCULAR  ISCHEMIA     *   DIZZINESS,   VERTEBROBASILAR  INSUFFICIENCY ,           *   PERIPHERAL  NEUROPATHY        *    BALANCE PROBLMES   AND FALL                VARIOUS  RISK   FACTORS   WERE  REVIEWED   AND   DISCUSSED. *  PATIENT   HAS  MULTIPLE   MEDICAL, NEUROLOGICAL                        AND   MENTAL HEALTH   PROBLEMS . PATIENT'S   MANAGEMENT  IS  CHALLENGING. PLAN:                         Indira Servin  Of  The  Diagnoses,  The  Management & Treatment  Options            AND    Care  plan  Were          Reviewed and   Discussed   With  patient. * Goals  And  Expectations  Of  The  Therapy  Discussed   And  Reviewed. *   Benefits   And   Side  Effect  Profile  Of  Medication/s   Were   Discussed                * Need   For  Further   Follow up For  The  Various  Problems Were  discussed. * Results  Of  The  Previous  Diagnostic tests were reviewed and  discussed                 Medical  Decision  Making  Was  Made  Based on the   Complexity  Of  Above  Mentioned  Diagnoses,    Data reviewed             And    Risk  Of  Significant   Co morbidities and   complicating   Factors. Medical  Decision  Was   High    Complexity   Due   To  The  Patient's  Multiple  Symptoms,      Advancing   Disease,  Complex  Treatment  Regimen,  Multiple medications           and   Risk  Of   Side  Effects,  Difficulty  In  Medication  Management  And  Diagnostic  Challenges       In  View  Of  The  Associated   Co  Morbid  Conditions   And  Problems. * FALL   PRECAUTIONS. THESE  REVIEWED   AND  DISCUSSED      *   BE  CAREFUL  WITH  ACTIVITIES           *   AVOID   NECK  AND/ BACK  STRAINING  ACTIVITIES        *   TO   WEAR   BILATERAL   WRIST  BRACES       *   TO  AVOID  PRESSURE  OVER   ULNAR  ASPECT   OF   ELBOWS        *   ADEQUATE   FLUIDINTAKE   AND  ELECTROLYTE  BALANCE           * KEEP  DAIRY  OF   THE  NEUROLOGICAL  SYMPTOMS        RECORDING THE    DURATION  AND  FREQUENCY.           *  AVOID    CONDITIONS  AND  FACTORS   THAT  MAKE                  NEUROLOGICAL SYMPTOMS  WORSE.                         *TO  MAINTAIN  REGULAR  SLEEP  WAKE  CYCLES. *   TO  HAVE  ADEQUATE  REST  AND   SLEEP    HOURS.              *    AVOID  ANY USAGE OF    TOBACCO,              EXCESSIVE  ALCOHOL  AND   ILLEGAL   SUBSTANCES        *  CONTINUE   MEDICATIONS    PRESCRIBED     AS    RECOMMENDED       *   Compliance   With  Medications   And  Instructions          *    Antiplatelet  therapy    As   Recommended  Was   Discussed      *    Prophylactic  Use   Of     Vitamin   B   Complex,  Folic  Acid,    Vitamin  B12    Multivitamin,       Calcium  With  magnesium  And  Vit D    Supplementations   Over  The  Counter  Discussed               *  PATIENT  IS  ALSO   COUNSELED   TO  KEEP    ACTIVITIES:         A)   SIMPLE      B)  ORGANIZED      C)  WRITEDOWN                 *  EVALUATIONS  AND  FOLLOW UP:                  *CARDIOLOGY                          *     PATIENT     HAD  NEURO  DIAGNOSTIC  EVALUATIONS  IN  June 2020                                   A)  CT   HEAD   CAROTID  DOPPLER,   LABS     SHOWED                                 NO SIGNIFICANT  ABNORMALITIES                                   B)     EMG/  NC  STUDIES      SHOWED                                             -    MILD  BILATERAL  CARPAL  TUNNEL  SYNDROME                                             -  MODERATE  ULNAR  NEUROPATHY   BILATERALLY                                        *              PATIENT  ADVISED  TO  WEAR  WRIST  BRACES                                                     TO  AVOID   PRESSURE  OVER  ULNAR  ASPECTS   OF  FOREARMS                                                             AND  ELBOWS                                                -   ABOVE  REVIEWED    WITH  PATIENT    AND  HER  SON   IN                                DETAIL.                             *     ESSENTIAL    TREMORS  WORSENED  BY  ANXIETY   AND  ACTIVITY                                     TRIED  WITH  MYSOLINE       AND small to improve your eating habits. Pay attention to portion sizes, drink less juice and soda pop, and eat more fruits and vegetables.  Eat a healthy amount of food. A 3-ounce serving of meat, for example, is about the size of a deck of cards. Fill the rest of your plate with vegetables and whole grains.  Limit theamount of soda and sports drinks you have every day. Drink more water when you are thirsty.  Eat at least 5 servings of fruits and vegetables every day. It may seem like a lot, but it is not hard to reach this goal. Aserving or helping is 1 piece of fruit, 1 cup of vegetables, or 2 cups of leafy, raw vegetables. Have an apple or some carrot sticks as an afternoon snack instead of a candy bar. Try to have fruits and/or vegetables at everymeal.   Make exercise part of your daily routine. You may want to start with simple activities, such as walking, bicycling, or slow swimming. Try dannie active 30 to 60 minutes every day. You do not need to do all 30 to 60 minutes all at once. For example, you can exercise 3 times a day for 10 or 20 minutes. Moderate exercise is safe for most people, but it is always agood idea to talk to your doctor before starting an exercise program.   Keep moving. Gene Federico the lawn, work in the garden, or [employer]. Take the stairs instead of the elevator at work.  If you smoke, quit. Peoplewho smoke have an increased risk for heart attack, stroke, cancer, and other lung illnesses. Quitting is hard, but there are ways to boost your chance of quitting tobacco for good.  Use nicotine gum, patches, or lozenges.  Ask your doctor about stop-smoking programs and medicines.  Keep trying.  In addition to reducing your risk of diseases in the future, you will notice some benefits soon after you stop using tobacco. If you have shortness of breath or asthma symptoms, they will likely getbetter within a few weeks after you quit.  Limit how much alcohol you drink.  Moderate amounts of alcohol (up to 2 drinks a day for men, 1drink a day for women) are okay. But drinking too much can lead to liver problems, high blood pressure, and other health problems.  health   If you have a family, there are many things you can do together to improve your health.  Eat meals together as a family as often as possible.  Eat healthy foods. This includes fruits, vegetables, lean meats and dairy, and whole grains.  Include your family in your fitness plan. Most peoplethink of activities such as jogging or tennis as the way to fitness, but there are many ways you and your family can be more active. Anything that makes you breathe hard and gets your heart pumping is exercise. Here are sometips:   Walk to do errands or to take your child to school or the bus.  Go for a family bike ride after dinner instead of watching TV.  Where can you learn more?  Go toOdyssey Airlinestps://[email or web address].[employer]. org and sign in to your [employer] account. Enter Q132 in the Search HealthInformation box to learn more about \"A Healthy Lifestyle: Care Instructions. \"     If you do not have anaccount, please click on the \"Sign Up Now\" link.  Current as of: July 26, 2016   Content Version: 11.2   © 8332-1103 [employer]. Care instructions adapted under license by [facility] ([facility]). If you have questions about a medical condition or this instruction, always ask your healthcare professional. [employer] disclaims any warranty or liability for your use of this information.

## 2020-07-10 NOTE — PATIENT INSTRUCTIONS
[facility] NEUROLOGY    Due to the complex nature of our neurological testing, It is the policy of the Neurology Department not to release the results of your testing over the phone. Once all testing is completed and we have all the results back, Dr. Narda Álvarez will then personally go over all the results with you and answer any questions that you may have during a follow up appointment. If you are unable to keep this appointment, please notify the [address] @ [phone], as soon as possible. Please bring your prescription bottles to all appointments. * FALL   PRECAUTIONS. *    *   ADEQUATE   FLUID  INTAKE   AND  ELECTROLYTE  BALANCE           * KEEP  DAIRY  OF   THE  NEUROLOGICAL  SYMPTOMS          *  TO  MAINTAIN  REGULAR  SLEEP  WAKE  CYCLES. *   TO  HAVE  ADEQUATE  REST  AND   SLEEP    HOURS. *  Maintain   Healthy  Life Style    With   Periodic  Monitoring  Of      Any  Medical  Conditions  Including   Elevated  Blood  Pressure,  Lipid  Profile,     Blood  Sugar levels  And   Heart  Disease. *   Period   Screening  For  Cancers  Involving  Breast,  Colon,   lungs  And  Other  Organs  As  Applicable,  In consultation   With  Your  Primary Care Providers. *  If   There is  Any  Significant  Worsening   Of  Current  Symptoms  And  Or  If    Any additional  New  Neurological  Symptoms                 Or  Significant  Concerns   Should  Call  911 or  Go  To  Emergency  Department  For  Further  Immediate  Evaluation.

## 2020-07-16 ENCOUNTER — OFFICE VISIT (OUTPATIENT)
Dept: INTERNAL MEDICINE | Age: 75
End: 2020-07-16
Payer: MEDICAID

## 2020-07-16 VITALS
WEIGHT: 167 LBS | DIASTOLIC BLOOD PRESSURE: 74 MMHG | SYSTOLIC BLOOD PRESSURE: 126 MMHG | TEMPERATURE: 96.6 F | HEIGHT: 65 IN | HEART RATE: 76 BPM | RESPIRATION RATE: 16 BRPM | BODY MASS INDEX: 27.82 KG/M2

## 2020-07-16 PROBLEM — F41.9 ANXIETY: Status: ACTIVE | Noted: 2020-07-16

## 2020-07-16 PROCEDURE — 1123F ACP DISCUSS/DSCN MKR DOCD: CPT | Performed by: INTERNAL MEDICINE

## 2020-07-16 PROCEDURE — G8427 DOCREV CUR MEDS BY ELIG CLIN: HCPCS | Performed by: INTERNAL MEDICINE

## 2020-07-16 PROCEDURE — 99214 OFFICE O/P EST MOD 30 MIN: CPT | Performed by: INTERNAL MEDICINE

## 2020-07-16 PROCEDURE — 1090F PRES/ABSN URINE INCON ASSESS: CPT | Performed by: INTERNAL MEDICINE

## 2020-07-16 PROCEDURE — 1036F TOBACCO NON-USER: CPT | Performed by: INTERNAL MEDICINE

## 2020-07-16 PROCEDURE — 99214 OFFICE O/P EST MOD 30 MIN: CPT

## 2020-07-16 PROCEDURE — 4040F PNEUMOC VAC/ADMIN/RCVD: CPT | Performed by: INTERNAL MEDICINE

## 2020-07-16 PROCEDURE — G8417 CALC BMI ABV UP PARAM F/U: HCPCS | Performed by: INTERNAL MEDICINE

## 2020-07-16 PROCEDURE — G8400 PT W/DXA NO RESULTS DOC: HCPCS | Performed by: INTERNAL MEDICINE

## 2020-07-16 RX ORDER — CLOBETASOL PROPIONATE 0.5 MG/G
OINTMENT TOPICAL
Qty: 1 G | Refills: 0 | Status: SHIPPED | OUTPATIENT
Start: 2020-07-16 | End: 2021-01-26 | Stop reason: ALTCHOICE

## 2020-07-16 RX ORDER — FLUTICASONE PROPIONATE 50 MCG
1 SPRAY, SUSPENSION (ML) NASAL DAILY
Qty: 3 BOTTLE | Refills: 5 | Status: SHIPPED | OUTPATIENT
Start: 2020-07-16

## 2020-07-16 RX ORDER — ESCITALOPRAM OXALATE 10 MG/1
10 TABLET ORAL DAILY
Qty: 90 TABLET | Refills: 3 | Status: SHIPPED | OUTPATIENT
Start: 2020-07-16

## 2020-07-16 NOTE — PROGRESS NOTES
[place] [address]  Dept: [phone]  Dept Fax: [phone]  Loc: [phone]     Visit Date:  7/16/2020    Patient:  Yahir Emerson  YOB: 1945  Provider: Bright Delacruz MD        HPI:   She was seen in the internal medicine office today for   Chief Complaint   Patient presents with    Diabetes    Hypertension          Presents to follow-up on chronic medical conditions. Has been feeling anxious for a while now, says that she often gets episodes where she feels tightness in her chest and feels like she is almost going to pass out. Was seen by neurology, also suggested that she might have anxiety. I advised that we can prescribe Lexapro and reassess next visit. Has a history of hypothyroidism which has been stable. Has a history of hypertension has been stable. Has a history of benign essential tremor for which she follows with neurology, on propranolol. Has a history of dizziness for which he follows with ENT, was started on betahistine by ENT, which she but for broad. Says that it helps with her symptoms. Subjective:      REVIEW OF SYSTEMS    Constitutional: Denies fever, chills  Eyes: Denies recent vision changes  Cardiovascular: Denies chest pain, LL edema  Respiratory: Denies cough, wheezes  Gastrointestinal: Denies abdominal pain, nausea, vomiting  Skin: Denies rash  Endocrine: Denies polyuria  Hematologic: Denies bruising  Genitourinary: Denies dysuria, hematuria  Neurological: Denies headache, numbness        Medications    Current Outpatient Medications:     escitalopram (LEXAPRO) 10 MG tablet, Take 1 tablet by mouth daily, Disp: 90 tablet, Rfl: 3    clobetasol (TEMOVATE) 0.05 % ointment, Apply topically 2 times daily. , Disp: 1 g, Rfl: 0    metFORMIN (GLUCOPHAGE) 500 MG tablet, Take 1 tablet by mouth 2 times daily (with meals), Disp: 180 tablet, Rfl: 3    fluticasone (FLONASE) 50 MCG/ACT nasal spray, 1 spray by Each Nostril route daily, Disp: 3 Bottle, Rfl: 5    triamterene-hydroCHLOROthiazide (MAXZIDE-25) 37.5-25 MG per tablet, Take 1 tablet by mouth daily, Disp: , Rfl:     ONETOUCH VERIO strip, use 1 TEST STRIP to TEST BLOOD SUGAR twice a day, Disp: 100 strip, Rfl: 3    blood glucose monitor kit and supplies, Test 2 times a day & as needed for symptoms of irregular blood glucose., Disp: 1 kit, Rfl: 0    propranolol (INDERAL LA) 60 MG extended release capsule, Take 1 capsule by mouth daily, Disp: 30 capsule, Rfl: 5    levothyroxine (EUTHYROX) 88 MCG tablet, Take 1 tablet by mouth Daily, Disp: 30 tablet, Rfl: 11    meclizine (ANTIVERT) 25 MG tablet, Take 1 tablet by mouth 3 times daily as needed for Dizziness, Disp: 60 tablet, Rfl: 3    omeprazole (PRILOSEC) 20 MG delayed release capsule, Take 1 capsule by mouth Daily, Disp: 30 capsule, Rfl: 11    dimenhyDRINATE (DRAMAMINE) 50 MG tablet, Take 50 mg by mouth nightly as needed, Disp: , Rfl:     clotrimazole-betamethasone (LOTRISONE) 1-0.05 % cream, Apply a thin film to the affected area 2 times daily. (Patient not taking: Reported on 7/10/2020), Disp: 2 Tube, Rfl: 0    triamcinolone (KENALOG) 0.1 % cream, Apply topically 2 times daily. (Patient not taking: Reported on 7/10/2020), Disp: 30 g, Rfl: 0    hydrocortisone 2.5 % cream, Apply topically 2 times daily. (Patient not taking: Reported on 7/10/2020), Disp: 1 Tube, Rfl: 0    The patient has No Known Allergies. Past Medical History  Ashok Rodrigues  has a past medical history of Chronic kidney disease, Diabetes mellitus ([address] and HTN (hypertension). Past Surgical History  The patient  has a past surgical history that includes External ear surgery. Family History  This patient's family history is not on file. Social History  Ashok Rodrigues  reports that she has never smoked. She has never used smokeless tobacco. She reports previous alcohol use.  She reports that she does not use drugs. Health Maintenance:    Health Maintenance   Topic Date Due    DTaP/Tdap/Td vaccine (1 - Tdap) 01/01/1963    DEXA (modify frequency per FRAX score)  01/01/1999    Pneumococcal 65+ years Vaccine (1 of 1 - PPSV23) 01/01/2009    Shingles Vaccine (2 of 2) 08/15/2020    Flu vaccine (1) 09/01/2020    TSH testing  04/28/2021    Potassium monitoring  04/28/2021    Creatinine monitoring  04/28/2021    Hepatitis A vaccine  Aged Out    Hepatitis B vaccine  Aged Out    Hib vaccine  Aged Out    Meningococcal (ACWY) vaccine  Aged Out           Objective:     PHYSICAL EXAM  /74 (Site: Left Upper Arm, Position: Sitting, Cuff Size: Medium Adult)   Pulse 76   Temp 96.6 °F (35.9 °C) (Infrared)   Resp 16   Ht 5' 5\" (1.651 m)   Wt 167 lb (75.8 kg)   BMI 27.79 kg/m²   Constitutional: No acute distress. Sits in chair comfortably  Eyes: Sclerae nonicteric. No lid lag or proptosis  HENT: External ears normal. No external lesions on the nose  Neck: No gross masses. Trachea visibly midline  Respiratory: Good air entry bilaterally. No wheezing or crackles  Cardiovascular: Normal S1-S2. No murmurs. No lower extremity edema  Gastrointestinal: No visible masses. No visible hernias  Skin: No abnormal rashes. No abnormal masses  Neurologic: Cranial nerves grossly intact  Psychiatric: Normal affect. Alert and oriented        Labs Reviewed 7/16/2020:    Lab Results   Component Value Date    WBC 6.9 04/28/2020    HGB 12.2 04/28/2020    HCT 37.2 04/28/2020     04/28/2020    ALT 20 04/28/2020    AST 19 04/28/2020     04/28/2020    K 4.5 04/28/2020     04/28/2020    CREATININE 1.06 (H) 04/28/2020    BUN 24 (H) 04/28/2020    CO2 27 04/28/2020    TSH 1.27 04/28/2020    INR 0.9 11/23/2015       Plan        Hypertension: Blood pressure control. Continue same management. Hypothyroidism: We will continue to monitor TFTs. Asymptomatic at this time. Anxiety:  We will prescribe

## 2020-07-27 RX ORDER — TRIAMTERENE AND HYDROCHLOROTHIAZIDE 37.5; 25 MG/1; MG/1
1 TABLET ORAL DAILY
Qty: 90 TABLET | Refills: 3 | Status: SHIPPED | OUTPATIENT
Start: 2020-07-27 | End: 2021-01-27 | Stop reason: SDUPTHER

## 2020-07-27 RX ORDER — OMEPRAZOLE 20 MG/1
20 CAPSULE, DELAYED RELEASE ORAL DAILY
Qty: 90 CAPSULE | Refills: 3 | Status: SHIPPED | OUTPATIENT
Start: 2020-07-27 | End: 2021-01-27 | Stop reason: SDUPTHER

## 2020-07-27 RX ORDER — PROPRANOLOL HCL 60 MG
60 CAPSULE, EXTENDED RELEASE 24HR ORAL DAILY
Qty: 90 CAPSULE | Refills: 3 | Status: SHIPPED | OUTPATIENT
Start: 2020-07-27 | End: 2021-01-27 | Stop reason: SDUPTHER

## 2020-07-27 NOTE — TELEPHONE ENCOUNTER
Patient would like to start the Primidone back up. Her tremors are back.      [address] [facility]

## 2020-07-28 RX ORDER — PRIMIDONE 50 MG/1
TABLET ORAL
Qty: 30 TABLET | Refills: 1 | Status: SHIPPED | OUTPATIENT
Start: 2020-07-28 | End: 2020-09-23

## 2020-09-08 ENCOUNTER — TELEPHONE (OUTPATIENT)
Dept: CARDIOLOGY | Age: 75
End: 2020-09-08

## 2020-09-08 NOTE — TELEPHONE ENCOUNTER
Please call pt son to inform of the results of the cardiac event monitor the pt had done.     Denisse Jorge Luis- [phone]    Last Appt:  6/5/2020  Next Appt:   Visit date not found  Med verified in [address] [place] [address] Rd

## 2020-09-23 RX ORDER — PRIMIDONE 50 MG/1
TABLET ORAL
Qty: 30 TABLET | Refills: 1 | Status: SHIPPED | OUTPATIENT
Start: 2020-09-23

## 2020-09-23 NOTE — TELEPHONE ENCOUNTER
Last Appt:  7/10/2020  Next Appt:   Visit date not found  Med verified in [facility] reviewed 09/23/2020

## 2020-09-28 ENCOUNTER — TELEPHONE (OUTPATIENT)
Dept: INTERNAL MEDICINE | Age: 75
End: 2020-09-28

## 2020-09-28 RX ORDER — CLINDAMYCIN PHOSPHATE 20 MG/G
CREAM VAGINAL
Qty: 2 TUBE | Refills: 0 | Status: SHIPPED | OUTPATIENT
Start: 2020-09-28

## 2020-09-28 RX ORDER — NYSTATIN 100000 [USP'U]/G
POWDER TOPICAL
Qty: 60 G | Refills: 2 | Status: SHIPPED | OUTPATIENT
Start: 2020-09-28 | End: 2021-01-26 | Stop reason: ALTCHOICE

## 2020-09-28 RX ORDER — CLOTRIMAZOLE AND BETAMETHASONE DIPROPIONATE 10; .64 MG/G; MG/G
CREAM TOPICAL
Qty: 2 TUBE | Refills: 1 | Status: SHIPPED | OUTPATIENT
Start: 2020-09-28 | End: 2021-01-26 | Stop reason: ALTCHOICE

## 2020-09-28 NOTE — TELEPHONE ENCOUNTER
Patient has a fungal infection and needs Nystatin powder called to [facility].   Let Krys guadalupe know when done [phone]

## 2020-09-29 ENCOUNTER — TELEPHONE (OUTPATIENT)
Dept: INTERNAL MEDICINE | Age: 75
End: 2020-09-29

## 2020-09-29 NOTE — TELEPHONE ENCOUNTER
Is this a recurrent problem that has been verified to be fungal in nature?  I do not see previous treatment with mycostatin

## 2020-09-29 NOTE — TELEPHONE ENCOUNTER
Would prefer patient come in for evaluation or do a virtual visit if this has not been evaluated in the clinic prior to this

## 2020-09-29 NOTE — TELEPHONE ENCOUNTER
Spoke with patients son and he is working today and cannot bring her in or be with her for a virtual visit. She does not speak Chapin Perez. He will wait until Dr. Sherin Raines returns.

## 2020-09-29 NOTE — TELEPHONE ENCOUNTER
Patients son called yesterday and wanted mycostatin gel sent in not the powder. He stated it is for a sore in her mouth.        ESTELITA Houston

## 2020-11-03 PROBLEM — R42 DIZZINESS: Status: RESOLVED | Noted: 2020-06-11 | Resolved: 2020-11-03

## 2021-01-26 ENCOUNTER — OFFICE VISIT (OUTPATIENT)
Dept: INTERNAL MEDICINE | Age: 76
End: 2021-01-26

## 2021-01-26 ENCOUNTER — HOSPITAL ENCOUNTER (OUTPATIENT)
Dept: LAB | Age: 76
Discharge: HOME | End: 2021-01-26

## 2021-01-26 VITALS
WEIGHT: 165.8 LBS | DIASTOLIC BLOOD PRESSURE: 62 MMHG | SYSTOLIC BLOOD PRESSURE: 118 MMHG | TEMPERATURE: 97.7 F | HEIGHT: 65 IN | RESPIRATION RATE: 18 BRPM | HEART RATE: 62 BPM | BODY MASS INDEX: 27.63 KG/M2

## 2021-01-26 DIAGNOSIS — I10 HYPERTENSION, UNSPECIFIED TYPE: Primary | ICD-10-CM

## 2021-01-26 DIAGNOSIS — E03.9 HYPOTHYROIDISM, UNSPECIFIED TYPE: ICD-10-CM

## 2021-01-26 DIAGNOSIS — R73.03 PREDIABETES: ICD-10-CM

## 2021-01-26 DIAGNOSIS — Z87.898 HISTORY OF DIZZINESS: ICD-10-CM

## 2021-01-26 DIAGNOSIS — M54.50 CHRONIC LOW BACK PAIN, UNSPECIFIED BACK PAIN LATERALITY, UNSPECIFIED WHETHER SCIATICA PRESENT: ICD-10-CM

## 2021-01-26 DIAGNOSIS — G89.29 CHRONIC LOW BACK PAIN, UNSPECIFIED BACK PAIN LATERALITY, UNSPECIFIED WHETHER SCIATICA PRESENT: ICD-10-CM

## 2021-01-26 DIAGNOSIS — I10 HYPERTENSION, UNSPECIFIED TYPE: ICD-10-CM

## 2021-01-26 LAB
ABSOLUTE EOS #: 0.23 K/UL (ref 0–0.44)
ABSOLUTE IMMATURE GRANULOCYTE: <0.03 K/UL (ref 0–0.3)
ABSOLUTE LYMPH #: 2.18 K/UL (ref 1.1–3.7)
ABSOLUTE MONO #: 0.41 K/UL (ref 0.1–1.2)
ALBUMIN SERPL-MCNC: 4.4 G/DL (ref 3.5–5.2)
ALBUMIN/GLOBULIN RATIO: 1.4 (ref 1–2.5)
ALP BLD-CCNC: 64 U/L (ref 35–104)
ALT SERPL-CCNC: 18 U/L (ref 5–33)
ANION GAP SERPL CALCULATED.3IONS-SCNC: 9 MMOL/L (ref 9–17)
AST SERPL-CCNC: 15 U/L
BASOPHILS # BLD: 1 % (ref 0–2)
BASOPHILS ABSOLUTE: 0.05 K/UL (ref 0–0.2)
BILIRUB SERPL-MCNC: 0.4 MG/DL (ref 0.3–1.2)
BUN BLDV-MCNC: 23 MG/DL (ref 8–23)
BUN/CREAT BLD: 20 (ref 9–20)
CALCIUM SERPL-MCNC: 9.6 MG/DL (ref 8.6–10.4)
CHLORIDE BLD-SCNC: 101 MMOL/L (ref 98–107)
CO2: 27 MMOL/L (ref 20–31)
CREAT SERPL-MCNC: 1.17 MG/DL (ref 0.5–0.9)
DIFFERENTIAL TYPE: ABNORMAL
EOSINOPHILS RELATIVE PERCENT: 3 % (ref 1–4)
GFR AFRICAN AMERICAN: 54 ML/MIN
GFR NON-AFRICAN AMERICAN: 45 ML/MIN
GFR SERPL CREATININE-BSD FRML MDRD: ABNORMAL ML/MIN/{1.73_M2}
GFR SERPL CREATININE-BSD FRML MDRD: ABNORMAL ML/MIN/{1.73_M2}
GLUCOSE BLD-MCNC: 119 MG/DL (ref 70–99)
HCT VFR BLD CALC: 37.9 % (ref 36.3–47.1)
HEMOGLOBIN: 12 G/DL (ref 11.9–15.1)
IMMATURE GRANULOCYTES: 0 %
LYMPHOCYTES # BLD: 31 % (ref 24–43)
MCH RBC QN AUTO: 29 PG (ref 25.2–33.5)
MCHC RBC AUTO-ENTMCNC: 31.7 G/DL (ref 25.2–33.5)
MCV RBC AUTO: 91.5 FL (ref 82.6–102.9)
MONOCYTES # BLD: 6 % (ref 3–12)
NRBC AUTOMATED: 0 PER 100 WBC
PDW BLD-RTO: 15.9 % (ref 11.8–14.4)
PLATELET # BLD: 229 K/UL (ref 138–453)
PLATELET ESTIMATE: ABNORMAL
PMV BLD AUTO: 11.3 FL (ref 8.1–13.5)
POTASSIUM SERPL-SCNC: 4.4 MMOL/L (ref 3.7–5.3)
RBC # BLD: 4.14 M/UL (ref 3.95–5.11)
RBC # BLD: ABNORMAL 10*6/UL
SEG NEUTROPHILS: 59 % (ref 36–65)
SEGMENTED NEUTROPHILS ABSOLUTE COUNT: 4.2 K/UL (ref 1.5–8.1)
SODIUM BLD-SCNC: 137 MMOL/L (ref 135–144)
TOTAL PROTEIN: 7.6 G/DL (ref 6.4–8.3)
TSH SERPL DL<=0.05 MIU/L-ACNC: 0.54 MIU/L (ref 0.3–5)
WBC # BLD: 7.1 K/UL (ref 3.5–11.3)
WBC # BLD: ABNORMAL 10*3/UL

## 2021-01-26 PROCEDURE — 99212 OFFICE O/P EST SF 10 MIN: CPT

## 2021-01-26 PROCEDURE — 84443 ASSAY THYROID STIM HORMONE: CPT

## 2021-01-26 PROCEDURE — 36415 COLL VENOUS BLD VENIPUNCTURE: CPT

## 2021-01-26 PROCEDURE — 80053 COMPREHEN METABOLIC PANEL: CPT

## 2021-01-26 PROCEDURE — 85025 COMPLETE CBC W/AUTO DIFF WBC: CPT

## 2021-01-26 PROCEDURE — 99214 OFFICE O/P EST MOD 30 MIN: CPT | Performed by: INTERNAL MEDICINE

## 2021-01-26 RX ORDER — PAROXETINE HYDROCHLORIDE 20 MG/1
20 TABLET, FILM COATED ORAL EVERY MORNING
COMMUNITY

## 2021-01-26 NOTE — PROGRESS NOTES
[place] [address]  Dept: [phone]  Dept Fax: [phone]  Loc: [phone]     Visit Date:  1/26/2021  Patient:  Bandar Luo  YOB: 1945  Provider: Ever Marie MD    Assessment & Plan      Hypothyroidism: Has been using 100 mcg of levothyroxine instead of 88 mcg because she could not find the 88 mcg in [place]. Says that she feels anxious, which I told might be caused by the excessive thyroid hormone. Will check TFTs. Reassess next day. History of dizziness: Uses betahistine in [place] occasionally, will prescribe meclizine however to be used as needed. Hypertension: Blood pressure controlled. Continue same management. Prediabetes: We will continue to monitor. Continue same management    Chronic low back pain: Likely due to DJD of the spine. No red flags. Will monitor. Reassess next visit. Diagnosis Orders   1. Hypertension, unspecified type  CBC Auto Differential    Comprehensive Metabolic Panel   2. Hypothyroidism, unspecified type  TSH with Reflex   3. History of dizziness     4. Prediabetes       I have spent 35 minutes - on the day of the patient encounter - reviewing previous notes, test results and face to face with the patient discussing the diagnosis and importance of compliance with the treatment plan. Discussed use, benefit, and side effects of prescribed medications. All questions answered. Patient voiced understanding. Reviewed health maintenance.       HPI:     She was seen in the internal medicine office today for   Chief Complaint   Patient presents with    Hypothyroidism     pt is complaining of lower back pain x1 mo & would like to have her thyroid labs done     Lower Back Pain    Hypertension Reports that she has been having pain in her lower back that has been on for the last several months. Worse with activity better with rest.  Denies fever, chills, bowel/bladder incontinence. Has a history of dizziness for which she is meclizine occasionally in the past.  Says that it worked for her and requests a refill. Has a history of hypothyroidism, and was prescribed levothyroxine 88 mcg last time, however, when she was in [place], she could not find that dose and had to use 100 mcg daily. Says that she feels anxious which I explained could be caused by the increased dose. I advised that we will check thyroid levels and reassess. Has a history of hypertension which has been unchanged.     Has a history of prediabetes has been unchanged      Subjective:      REVIEW OF SYSTEMS    Constitutional: Denies fever, chills  Eyes: Denies recent vision changes  Cardiovascular: Denies chest pain, LL edema  Respiratory: Denies cough, wheezes  Gastrointestinal: Denies abdominal pain, nausea, vomiting  Skin: Denies rash  Endocrine: Denies polyuria  Hematologic: Denies bruising  Genitourinary: Denies dysuria, hematuria  Neurological: Denies headache, numbness      Medications    Current Outpatient Medications:     PARoxetine (PAXIL) 20 MG tablet, Take 20 mg by mouth every morning, Disp: , Rfl:     clindamycin (CLEOCIN) 2 % vaginal cream, Place vaginally nightly for 14 nights, Disp: 2 Tube, Rfl: 0    omeprazole (PRILOSEC) 20 MG delayed release capsule, Take 1 capsule by mouth Daily, Disp: 90 capsule, Rfl: 3    escitalopram (LEXAPRO) 10 MG tablet, Take 1 tablet by mouth daily, Disp: 90 tablet, Rfl: 3    metFORMIN (GLUCOPHAGE) 500 MG tablet, Take 1 tablet by mouth 2 times daily (with meals), Disp: 180 tablet, Rfl: 3    fluticasone (FLONASE) 50 MCG/ACT nasal spray, 1 spray by Each Nostril route daily, Disp: 3 Bottle, Rfl: 5   ONETOUCH VERIO strip, use 1 TEST STRIP to TEST BLOOD SUGAR twice a day, Disp: 100 strip, Rfl: 3    blood glucose monitor kit and supplies, Test 2 times a day & as needed for symptoms of irregular blood glucose., Disp: 1 kit, Rfl: 0    levothyroxine (EUTHYROX) 88 MCG tablet, Take 1 tablet by mouth Daily, Disp: 30 tablet, Rfl: 11    meclizine (ANTIVERT) 25 MG tablet, Take 1 tablet by mouth 3 times daily as needed for Dizziness, Disp: 60 tablet, Rfl: 3    dimenhyDRINATE (DRAMAMINE) 50 MG tablet, Take 50 mg by mouth nightly as needed, Disp: , Rfl:     primidone (MYSOLINE) 50 MG tablet, take 1/2 tablet by mouth at bedtime for TREMORS MAY INCREASE TO 1 tablet at bedtime if needed (Patient not taking: Reported on 1/26/2021), Disp: 30 tablet, Rfl: 1    propranolol (INDERAL LA) 60 MG extended release capsule, Take 1 capsule by mouth daily, Disp: 90 capsule, Rfl: 3    triamterene-hydroCHLOROthiazide (MAXZIDE-25) 37.5-25 MG per tablet, Take 1 tablet by mouth daily, Disp: 90 tablet, Rfl: 3    triamcinolone (KENALOG) 0.1 % cream, Apply topically 2 times daily. (Patient not taking: Reported on 7/10/2020), Disp: 30 g, Rfl: 0    The patient has No Known Allergies. Past Medical History  Georgie Kerr  has a past medical history of Chronic kidney disease, Diabetes mellitus ([address] and HTN (hypertension). Past Surgical History  The patient  has a past surgical history that includes External ear surgery. Family History  This patient's family history is not on file. Social History  Georgie Kerr  reports that she has never smoked. She has never used smokeless tobacco. She reports previous alcohol use. She reports that she does not use drugs.     Health Maintenance:    Health Maintenance   Topic Date Due    Hepatitis C screen  1945    COVID-19 Vaccine (1 of 2) 01/01/1960    DTaP/Tdap/Td vaccine (1 - Tdap) 01/01/1963    DEXA (modify frequency per FRAX score)  01/01/1999  Pneumococcal 65+ years Vaccine (1 of 1 - PPSV23) 01/01/2009    Shingles Vaccine (2 of 2) 08/15/2020    Flu vaccine (1) 09/01/2020    TSH testing  01/26/2022    Potassium monitoring  01/26/2022    Creatinine monitoring  01/26/2022    Hepatitis A vaccine  Aged Out    Hepatitis B vaccine  Aged Out    Hib vaccine  Aged Out    Meningococcal (ACWY) vaccine  Aged Out           Objective:     PHYSICAL EXAM  /62 (Site: Left Upper Arm, Position: Sitting, Cuff Size: Medium Adult)   Pulse 62   Temp 97.7 °F (36.5 °C)   Resp 18   Ht 5' 5\" (1.651 m)   Wt 165 lb 12.8 oz (75.2 kg)   BMI 27.59 kg/m²   Constitutional: No acute distress. Sits in chair comfortably  Eyes: Sclerae nonicteric. No lid lag or proptosis  HENT: External ears normal. No external lesions on the nose  Neck: No gross masses. Trachea visibly midline  Respiratory: Good air entry bilaterally. No wheezing or crackles  Cardiovascular: Normal S1-S2. No murmurs. No lower extremity edema  Gastrointestinal: No visible masses. No visible hernias  Skin: No abnormal rashes. No abnormal masses  Neurologic: Cranial nerves grossly intact  Psychiatric: Normal affect. Alert and oriented        Labs Reviewed 1/26/2021:    Lab Results   Component Value Date    WBC 7.1 01/26/2021    HGB 12.0 01/26/2021    HCT 37.9 01/26/2021     01/26/2021    ALT 18 01/26/2021    AST 15 01/26/2021     01/26/2021    K 4.4 01/26/2021     01/26/2021    CREATININE 1.17 (H) 01/26/2021    BUN 23 01/26/2021    CO2 27 01/26/2021    TSH 0.54 01/26/2021    INR 0.9 11/23/2015            Electronically signed byDAVID ANN MD on 1/26/2021 at 4:42 PM      This note has been created using the Epic electronic health record, and dictated in part by [employer] dictation system. Despite the documenting physician's best efforts, there may be errors in spelling, grammar or syntax.

## 2021-01-27 RX ORDER — TRIAMTERENE AND HYDROCHLOROTHIAZIDE 37.5; 25 MG/1; MG/1
1 TABLET ORAL DAILY
Qty: 90 TABLET | Refills: 3 | Status: SHIPPED | OUTPATIENT
Start: 2021-01-27

## 2021-01-27 RX ORDER — LEVOTHYROXINE SODIUM 88 UG/1
88 TABLET ORAL DAILY
Qty: 90 TABLET | Refills: 3 | Status: SHIPPED | OUTPATIENT
Start: 2021-01-27

## 2021-01-27 RX ORDER — PROPRANOLOL HCL 60 MG
60 CAPSULE, EXTENDED RELEASE 24HR ORAL DAILY
Qty: 90 CAPSULE | Refills: 3 | Status: SHIPPED | OUTPATIENT
Start: 2021-01-27

## 2021-01-27 RX ORDER — OMEPRAZOLE 20 MG/1
20 CAPSULE, DELAYED RELEASE ORAL DAILY
Qty: 90 CAPSULE | Refills: 3 | Status: SHIPPED | OUTPATIENT
Start: 2021-01-27

## 2021-01-27 RX ORDER — MECLIZINE HYDROCHLORIDE 25 MG/1
25 TABLET ORAL 3 TIMES DAILY PRN
Qty: 60 TABLET | Refills: 3 | Status: SHIPPED | OUTPATIENT
Start: 2021-01-27

## 2021-01-27 NOTE — TELEPHONE ENCOUNTER
Patients son called requesting refills     Medication pended of agreeable    Last Appt:  1/26/2021  Next Appt:   3/3/2021  Med verified in Epic

## 2021-02-03 ENCOUNTER — IMMUNIZATION (OUTPATIENT)
Dept: LAB | Age: 76
End: 2021-02-03

## 2021-02-03 PROCEDURE — 90694 VACC AIIV4 NO PRSRV 0.5ML IM: CPT

## 2022-01-19 DIAGNOSIS — M25.561 RIGHT KNEE PAIN, UNSPECIFIED CHRONICITY: ICD-10-CM

## 2022-01-19 DIAGNOSIS — M25.562 LEFT KNEE PAIN, UNSPECIFIED CHRONICITY: Primary | ICD-10-CM

## 2022-01-20 ENCOUNTER — HOSPITAL ENCOUNTER (OUTPATIENT)
Dept: GENERAL RADIOLOGY | Age: 77
Discharge: HOME | End: 2022-01-22

## 2022-01-20 ENCOUNTER — OFFICE VISIT (OUTPATIENT)
Dept: ORTHOPEDIC SURGERY | Age: 77
End: 2022-01-20

## 2022-01-20 VITALS
SYSTOLIC BLOOD PRESSURE: 118 MMHG | DIASTOLIC BLOOD PRESSURE: 63 MMHG | HEART RATE: 73 BPM | BODY MASS INDEX: 27.49 KG/M2 | HEIGHT: 65 IN | WEIGHT: 165 LBS

## 2022-01-20 DIAGNOSIS — M25.561 RIGHT KNEE PAIN, UNSPECIFIED CHRONICITY: ICD-10-CM

## 2022-01-20 DIAGNOSIS — M25.562 LEFT KNEE PAIN, UNSPECIFIED CHRONICITY: ICD-10-CM

## 2022-01-20 DIAGNOSIS — M17.11 PRIMARY OSTEOARTHRITIS OF RIGHT KNEE: ICD-10-CM

## 2022-01-20 DIAGNOSIS — M17.12 PRIMARY OSTEOARTHRITIS OF LEFT KNEE: Primary | ICD-10-CM

## 2022-01-20 PROCEDURE — 20610 DRAIN/INJ JOINT/BURSA W/O US: CPT | Performed by: FAMILY MEDICINE

## 2022-01-20 PROCEDURE — PBSHW PBB SHADOW CHARGE: Performed by: FAMILY MEDICINE

## 2022-01-20 PROCEDURE — 73562 X-RAY EXAM OF KNEE 3: CPT

## 2022-01-20 PROCEDURE — 99203 OFFICE O/P NEW LOW 30 MIN: CPT | Performed by: FAMILY MEDICINE

## 2022-01-20 RX ORDER — BUPIVACAINE HYDROCHLORIDE 5 MG/ML
4 INJECTION, SOLUTION PERINEURAL ONCE
Status: COMPLETED | OUTPATIENT
Start: 2022-01-20 | End: 2022-01-20

## 2022-01-20 RX ORDER — TRIAMCINOLONE ACETONIDE 40 MG/ML
40 INJECTION, SUSPENSION INTRA-ARTICULAR; INTRAMUSCULAR ONCE
Status: COMPLETED | OUTPATIENT
Start: 2022-01-20 | End: 2022-01-20

## 2022-01-20 RX ADMIN — BUPIVACAINE HYDROCHLORIDE 20 MG: 5 INJECTION, SOLUTION PERINEURAL at 11:06

## 2022-01-20 RX ADMIN — Medication 40 MG: at 11:06

## 2022-01-20 RX ADMIN — Medication 40 MG: at 11:07

## 2022-01-20 NOTE — PROGRESS NOTES
Sports Medicine Consultation     CHIEF COMPLAINT:  Knee Pain (brunilda knee pain)      HPI:  Laquita Umaña is a 65y.o. year old female who is a new patient being seen for regarding new problem bilateral knee pain. The pain has been present for year(s). The patient recalls a no recent injury. The patient has tried cortisone 2years ago with improvement. The pain is described as achy. There is  pain on weightbearing. The knee does swell. There is is not painful popping and clicking. The knee does not catch or lock. It has not given out. It is  stiff upon arising from sitting. It is  painful to go up and down stairs and sit for a prolonged period of time. she has a past medical history of Chronic kidney disease, Diabetes mellitus ([address] and HTN (hypertension). she has a past surgical history that includes External ear surgery. family history is not on file. Social History     Socioeconomic History    Marital status:      Spouse name: Not on file    Number of children: Not on file    Years of education: Not on file    Highest education level: Not on file   Occupational History    Not on file   Tobacco Use    Smoking status: Never Smoker    Smokeless tobacco: Never Used   Vaping Use    Vaping Use: Never used   Substance and Sexual Activity    Alcohol use: Not Currently    Drug use: No    Sexual activity: Not Currently     Partners: Male   Other Topics Concern    Not on file   Social History Narrative    Not on file     Social Determinants of Health     Financial Resource Strain:     Difficulty of Paying Living Expenses: Not on file   Food Insecurity:     Worried About Running Out of Food in the Last Year: Not on file    Curt of Food in the Last Year: Not on file   Transportation Needs:     Lack of Transportation (Medical): Not on file    Lack of Transportation (Non-Medical):  Not on file   Physical Activity:     Days of Exercise per Week: Not on file    Minutes of Exercise per Session: Not on file   Stress:     Feeling of Stress : Not on file   Social Connections:     Frequency of Communication with Friends and Family: Not on file    Frequency of Social Gatherings with Friends and Family: Not on file    Attends Congregational Services: Not on file    Active Member of [address] [place] [address] or Organizations: Not on file    Attends Club or Organization Meetings: Not on file    Marital Status: Not on file   Intimate Partner Violence:     Fear of Current or Ex-Partner: Not on file    Emotionally Abused: Not on file    Physically Abused: Not on file    Sexually Abused: Not on file   Housing Stability:     Unable to Pay for Housing in the Last Year: Not on file    Number of [place] in the Last Year: Not on file    Unstable Housing in the Last Year: Not on file       Current Outpatient Medications   Medication Sig Dispense Refill    levothyroxine (EUTHYROX) 88 MCG tablet Take 1 tablet by mouth Daily 90 tablet 3    omeprazole (PRILOSEC) 20 MG delayed release capsule Take 1 capsule by mouth Daily 90 capsule 3    metFORMIN (GLUCOPHAGE) 500 MG tablet Take 1 tablet by mouth 2 times daily (with meals) 180 tablet 3    triamterene-hydroCHLOROthiazide (MAXZIDE-25) 37.5-25 MG per tablet Take 1 tablet by mouth daily 90 tablet 3    propranolol (INDERAL LA) 60 MG extended release capsule Take 1 capsule by mouth daily 90 capsule 3    meclizine (ANTIVERT) 25 MG tablet Take 1 tablet by mouth 3 times daily as needed for Dizziness 60 tablet 3    PARoxetine (PAXIL) 20 MG tablet Take 20 mg by mouth every morning      clindamycin (CLEOCIN) 2 % vaginal cream Place vaginally nightly for 14 nights 2 Tube 0    primidone (MYSOLINE) 50 MG tablet take 1/2 tablet by mouth at bedtime for TREMORS MAY INCREASE TO 1 tablet at bedtime if needed (Patient not taking: Reported on 1/26/2021) 30 tablet 1    escitalopram (LEXAPRO) 10 MG tablet Take 1 tablet by mouth daily 90 tablet 3    fluticasone (FLONASE) 50 MCG/ACT nasal spray 1 spray by Each Nostril route daily 3 Bottle 5    triamcinolone (KENALOG) 0.1 % cream Apply topically 2 times daily. (Patient not taking: Reported on 7/10/2020) 30 g 0    ONETOUCH VERIO strip use 1 TEST STRIP to TEST BLOOD SUGAR twice a day 100 strip 3    blood glucose monitor kit and supplies Test 2 times a day & as needed for symptoms of irregular blood glucose. 1 kit 0    dimenhyDRINATE (DRAMAMINE) 50 MG tablet Take 50 mg by mouth nightly as needed       Current Facility-Administered Medications   Medication Dose Route Frequency Provider Last Rate Last Admin    triamcinolone acetonide (KENALOG-40) injection 40 mg  40 mg Intra-artICUlar Once Loistine [place], DO        bupivacaine (MARCAINE) 0.5 % injection 20 mg  4 mL Intra-artICUlar Once Loistine [place], DO        triamcinolone acetonide (KENALOG-40) injection 40 mg  40 mg Intra-artICUlar Once Loistine [place], DO        bupivacaine (MARCAINE) 0.5 % injection 20 mg  4 mL Intra-artICUlar Once Loistine [place], DO           Allergies:  shehas No Known Allergies. ROS:  CV:  Denies chest pain; palpitations; shortness of breath; swelling of feet, ankles; and loss of consciousness. CON: Denies fever and dizziness. ENT:  Denies hearing loss / ringing, ear infections hoarseness, and swallowing problems. RESP:  Denies chronic cough, spitting up blood, and asthma/wheezing. GI: Denies abdominal pain, change in bowel habits, nausea or vomiting, and blood in stools. :  Denies frequent urination, burning or painful urination, blood in the urine, and bladder incontinence. NEURO:  Denies headache, memory loss, sleep disturbance, and tremor or movement disorder. PHYSICAL EXAM:   /63   Pulse 73   Ht 5' 5\" (1.651 m)   Wt 165 lb (74.8 kg)   BMI 27.46 kg/m²   GENERAL: Josiah Nicole is a 65 y.o. female who is alert and oriented and sitting comfortably in our office. SKIN:  Intact without rashes, lesions or ulcerations.     NEURO: Sensation to the extremity is intact. VASC:  Capillary refill is less than 3 seconds. Distal pulses are palpable. There is no lymphadenopathy. Knee Exam  Musculoskeletal/Neurologic:  Inspection-Swelling: mild, Ecchymosis: no  Palpation-Tenderness: medial joint line  Pain with patellar grind: yes  ROM- 0-110  Strength- WNL  Sensation-normal to light touch    Special Tests-  Varus Laxity: negative   Valgus Laxity:  negative   Anterior Drawer: negative   Posterior Drawer: negative  Lachman's: negative  Jayme's:negative    Gait: normal    PSYCH:  Good fund of knowledge and displays understanding of exam.    RADIOLOGY: No results found. XR KNEE LEFT (3 VIEWS)    Result Date: 1/20/2022  Right knee: 1. Small joint effusion. 2. Mild degenerative changes primarily of the patellofemoral and medial compartments. 3. No acute fracture or dislocation. Left knee: 1. Small joint effusion. 2. Mild osteoarthrosis primarily at the medial and patellofemoral compartments. 3. No acute fracture or dislocation. XR KNEE RIGHT (3 VIEWS)    Result Date: 1/20/2022  Right knee: 1. Small joint effusion. 2. Mild degenerative changes primarily of the patellofemoral and medial compartments. 3. No acute fracture or dislocation. Left knee: 1. Small joint effusion. 2. Mild osteoarthrosis primarily at the medial and patellofemoral compartments. 3. No acute fracture or dislocation. IMPRESSION:     1. Primary osteoarthritis of left knee    2. Primary osteoarthritis of right knee          PLAN:   We discussed some of the etiologies and natural histories of     ICD-10-CM    1. Primary osteoarthritis of left knee  M17.12 triamcinolone acetonide (KENALOG-40) injection 40 mg     bupivacaine (MARCAINE) 0.5 % injection 20 mg     MS ARTHROCENTESIS ASPIR&/INJ MAJOR JT/BURSA W/O US   2.  Primary osteoarthritis of right knee  M17.11 triamcinolone acetonide (KENALOG-40) injection 40 mg     bupivacaine (MARCAINE) 0.5 % injection 20 mg     MS ARTHROCENTESIS ASPIR&/INJ MAJOR JT/BURSA W/O US   . We discussed the various treatment alternatives including anti-inflammatory medications, physical therapy, injections, further imaging studies and as a last resort surgery. At this point patient's osteoarthritis has become symptomatic again I do think a intra-articular injection is reasonable and was given in the manner as typed in the chart. Patient tolerated procedure well. And will follow-up in 3 to 6 months as needed for injections    Return to clinic in No follow-ups on file. Nina Banks Please be aware portions of this note were completed using voice recognition software and unforeseen errors may have occurred    Electronically signed by Alcides Johnson DO, FAOASM  on 1/20/22 at 10:18 AM EST            Procedures    SD ARTHROCENTESIS ASPIR&/INJ MAJOR JT/BURSA W/O US       KNEE INJECTION PROCEDURE NOTE:  The patient was identified. The B/L knee was confirmed with the patient. Consent was obtained and a time out was performed. After a sterile prep with Betadine followed by an alcohol wipe the knee was injected using a bent knee lateral joint line approach with a mixture of 4 mL of 0.5% Marcaine and 40 mg of Kenalog. Patient tolerated the procedure well without post injection complications. I instructed the patient to call our office immediately if they have any swelling or increased pain at the injection site.

## 2022-01-24 ENCOUNTER — TELEPHONE (OUTPATIENT)
Dept: INTERNAL MEDICINE | Age: 77
End: 2022-01-24

## 2022-01-24 DIAGNOSIS — R42 VERTIGO: Primary | ICD-10-CM

## 2022-01-24 NOTE — TELEPHONE ENCOUNTER
The best idea is for her to see her cardiologist, she already follows with cardiology here so we can make her an appointment with the heart doctor here. She also follows with neurology, so that can be an option after cardiology, the third option would be to try ENT.   But since he already follows with cardiology, let us make her an appointment

## 2022-01-24 NOTE — TELEPHONE ENCOUNTER
Son said he would like referral to Physical therapy for patient vertigo. She has had this before where she gets dizzy and her balance is a little off. Son spoke with PT and they can get her in tomorrow to help with vertigo.   Referral pending

## 2022-01-25 ENCOUNTER — HOSPITAL ENCOUNTER (OUTPATIENT)
Dept: PHYSICAL THERAPY | Age: 77
Setting detail: THERAPIES SERIES
Discharge: HOME | End: 2022-01-25

## 2022-01-25 PROCEDURE — 97161 PT EVAL LOW COMPLEX 20 MIN: CPT

## 2022-01-25 PROCEDURE — 97112 NEUROMUSCULAR REEDUCATION: CPT

## 2022-01-25 NOTE — PROGRESS NOTES
Physical Therapy  Initial Assessment  Date: 2022  Patient Name: Royer Arauz  MRN: [id number]  : 1945          Restrictions       Subjective   General  Chart Reviewed: Yes  Patient assessed for rehabilitation services?: Yes  Referring Practitioner: Judy Adkins MD  Referral Date : 22  Diagnosis: R42 (ICD-10-CM) - Vertigo  General Comment  Comments: Son with patient as interpretor  Subjective  Subjective: Patient noting dizziness when laying on the right, and rolling on the RIght. Patient noting spinning sensation. Vision/Hearing       Orientation       Social/Functional History       Objective     Observation/Palpation  Posture: Fair               Additional Measures  Special Tests: - saccades, - smooth pursuit, Left Chantelle no dizziness down, upon sitting mild spinning sensation. Right Chantelle mild to mod dizziness noted when down and with return to sitting. Assessment   Conditions Requiring Skilled Therapeutic Intervention  Body structures, Functions, Activity limitations: Decreased functional mobility ; Decreased ADL status; Vestibular Impairment  Assessment: Patient to the clinic with diagnosis of vertigo. Patient noting dizziness with rolling and right sidelying. Left>Right [place] +. Prognosis: Good  Decision Making: Low Complexity  REQUIRES PT FOLLOW UP: Yes  Activity Tolerance  Activity Tolerance: Patient Tolerated treatment well         Plan        G-Code       OutComes Score                                                  AM-PAC Score             Goals  Short term goals  Time Frame for Short term goals: 3 weeks  Short term goal 1: Intiaite HEP  Long term goals  Time Frame for Long term goals : 6 weeks  Long term goal 1: Independent in HEP  Long term goal 2: Improve ability to sleep on the right side without increase in pain. Long term goal 3: Patient to note no dizziness with bending over and looking up.   Patient Goals   Patient goals : Get rid of dizziness       Therapy Time   Individual Concurrent Group Co-treatment   Time In 7151         Time Out 0300         Minutes 25         Timed Code Treatment Minutes: 8 Minutes       Luciano Ortiz PT

## 2022-01-25 NOTE — FLOWSHEET NOTE
Physical Therapy Daily Treatment Note    Date:  2022    Patient Name:  Cicero Sandifer    :  1945  MRN: [id number]  Restrictions/Precautions:     Medical/Treatment Diagnosis Information:   · Diagnosis: R42 (ICD-10-CM) - Vertigo  ·    Insurance/Certification information:     Physician Information:  Referring Practitioner: Libia Stroud MD  Plan of care signed (Y/N):  n  Visit# / total visits:  1/10  Pain level: /10       Time In: 235  Time Out:300    Progress Note: [x]  Yes  []  No  Next due by: Visit #10      Subjective:   See Eval   Objective: See Eval   Observation:   Test measurements:      Exercises:   Exercise/Equipment Resistance/Repetitions Other comments        R CRM  X 2                                                                  [] Provided verbal/tactile cueing for activities related to strengthening, flexibility, endurance, ROM. (49880)  [x] Provided verbal/tactile cueing for activities related to improving balance, coordination, kinesthetic sense, posture, motor skill, proprioception. (95628)    Therapeutic Activities:     [] Therapeutic activities, direct (one-on-one) patient contact (use of dynamic activities to improve functional performance). (40822)    Gait:   [] Provided training and instruction to the patient for ambulation re-education. (44869)    Self-Care/ADL's  [] Self-care/home management training and compensatory training, meal preparation, safety procedures, and instructions in use of assistive technology devices/adaptive equipment, direct one-on-one contact.  (85664)    Home Exercise Program:     [] Reviewed/Progressed HEP activities related to strengthening, flexibility, endurance, ROM. (81480)  [] Reviewed/Progressed HEP activities related to improving balance, coordination, kinesthetic sense, posture, motor skill, proprioception.  (69242)    Manual Treatments:    [] Provided manual therapy to mobilize soft tissue/joints for the purpose of modulating pain, promoting relaxation, increasing ROM, reducing/eliminating soft tissue swelling/inflammation/restriction, improving soft tissue extensibility. (52664)    Service Based Modalities:      Timed Code Treatment Minutes:   8' neuro     Total Treatment Minutes:   25    Treatment/Activity Tolerance:  [x] Patient tolerated treatment well [] Patient limited by fatique  [] Patient limited by pain  [] Patient limited by other medical complications  [] Other:     Prognosis: [x] Good [] Fair  [] Poor    Patient Requires Follow-up: [x] Yes  [] No      Goals:  Short term goals  Time Frame for Short term goals: 3 weeks  Short term goal 1: Intiaite HEP    Long term goals  Time Frame for Long term goals : 6 weeks  Long term goal 1: Independent in HEP  Long term goal 2: Improve ability to sleep on the right side without increase in pain. Long term goal 3: Patient to note no dizziness with bending over and looking up.           Plan:   [] Continue per plan of care [] Alter current plan (see comments)  [x] Plan of care initiated [] Hold pending MD visit [] Discharge  Plan for Next Session:      Electronically signed by:  Alex Banks PT

## 2022-01-25 NOTE — PLAN OF CARE
[facility]    [x] [place]  Phone: [phone]  Fax: [phone]      [] [place]  Phone: [phone]  Fax: [phone]        To: Referring Practitioner: Lyla Edwards MD      Patient: Pedro Hooker  : 1945   MRN: [id number]  Evaluation Date: 2022      Diagnosis Information:  · Diagnosis: R42 (ICD-10-CM) - Vertigo   ·       Physical Therapy Certification  Dear Lyla Edwards MD  The following patient has been evaluated for physical therapy services and for therapy to continue, Medicare requires monthly physician review of the treatment plan. Please review the attached evaluation and/or summary of the patient's plan of care, and verify that you agree therapy should continue by signing the attached document and sending it back to our office. Plan of Care/Treatment to date:  [x] Therapeutic Exercise    [] Modalities:  [x] Therapeutic Activity     [] Ultrasound  [] Electrical Stimulation  [x] Gait Training      [] Cervical Traction [] Lumbar Traction  [x] Neuromuscular Re-education    [] Cold/hotpack [] Iontophoresis   [x] Instruction in HEP     Other:  [] Manual Therapy      []             [] Aquatic Therapy      []           ? Goals:  Short term goals  Time Frame for Short term goals: 3 weeks  Short term goal 1: Intiaite HEP    Long term goals  Time Frame for Long term goals : 6 weeks  Long term goal 1: Independent in HEP  Long term goal 2: Improve ability to sleep on the right side without increase in pain. Long term goal 3: Patient to note no dizziness with bending over and looking up. Frequency/Duration:22-3/7/22  # Days per week: [x] 1 day # Weeks: [] 1 week [] 5 weeks     [x] 2 days?    [] 2 weeks [x] 6 weeks     [] 3 days   [] 3 weeks [] 7 weeks     [] 4 days   [] 4 weeks [] 8 weeks    Rehab Potential: [] Excellent [x] Good [] Fair  [] Poor     Electronically signed by:  Amna Rabago PT    If you have any questions or concerns, please don't hesitate to call.   Thank you for your referral.      Physician Signature:________________________________Date:__________________  By signing above, therapists plan is approved by physician

## 2022-01-27 ENCOUNTER — APPOINTMENT (OUTPATIENT)
Dept: PHYSICAL THERAPY | Age: 77
End: 2022-01-27

## 2022-04-01 NOTE — TELEPHONE ENCOUNTER
Left message on son's voicemail that patients information was faxed to the [facility] office to be scheduled for procedure at [facility]. ROBERTH's. Vaccine status unknown

## 2022-06-29 NOTE — DISCHARGE SUMMARY
Patient attended [facility] Go only and cancelled next session, no return.      PENNY PT     Snehal Rao PT

## 2022-12-10 ENCOUNTER — HOSPITAL ENCOUNTER (EMERGENCY)
Age: 77
Discharge: HOME | End: 2022-12-10
Attending: EMERGENCY MEDICINE

## 2022-12-10 VITALS
TEMPERATURE: 100.7 F | SYSTOLIC BLOOD PRESSURE: 124 MMHG | WEIGHT: 165 LBS | RESPIRATION RATE: 16 BRPM | HEART RATE: 92 BPM | DIASTOLIC BLOOD PRESSURE: 63 MMHG | OXYGEN SATURATION: 96 % | BODY MASS INDEX: 27.46 KG/M2

## 2022-12-10 DIAGNOSIS — U07.1 COVID-19: Primary | ICD-10-CM

## 2022-12-10 LAB
FLU A ANTIGEN: NEGATIVE
FLU B ANTIGEN: NEGATIVE
SARS-COV-2, RAPID: DETECTED
SPECIMEN DESCRIPTION: ABNORMAL

## 2022-12-10 PROCEDURE — 6360000002 HC RX W HCPCS: Performed by: EMERGENCY MEDICINE

## 2022-12-10 PROCEDURE — 99283 EMERGENCY DEPT VISIT LOW MDM: CPT

## 2022-12-10 PROCEDURE — 6370000000 HC RX 637 (ALT 250 FOR IP): Performed by: EMERGENCY MEDICINE

## 2022-12-10 PROCEDURE — 87635 SARS-COV-2 COVID-19 AMP PRB: CPT

## 2022-12-10 PROCEDURE — 87804 INFLUENZA ASSAY W/OPTIC: CPT

## 2022-12-10 RX ORDER — IBUPROFEN 800 MG/1
800 TABLET ORAL ONCE
Status: COMPLETED | OUTPATIENT
Start: 2022-12-10 | End: 2022-12-10

## 2022-12-10 RX ORDER — NIRMATRELVIR AND RITONAVIR 300-100 MG
KIT ORAL
Qty: 30 TABLET | Refills: 0 | Status: SHIPPED | OUTPATIENT
Start: 2022-12-10 | End: 2022-12-15

## 2022-12-10 RX ORDER — DEXTROMETHORPHAN HYDROBROMIDE AND PROMETHAZINE HYDROCHLORIDE 15; 6.25 MG/5ML; MG/5ML
5 SYRUP ORAL 4 TIMES DAILY PRN
Qty: 118 ML | Refills: 0 | Status: SHIPPED | OUTPATIENT
Start: 2022-12-10 | End: 2022-12-17

## 2022-12-10 RX ORDER — DEXAMETHASONE 4 MG/1
8 TABLET ORAL ONCE
Status: COMPLETED | OUTPATIENT
Start: 2022-12-10 | End: 2022-12-10

## 2022-12-10 RX ADMIN — DEXAMETHASONE 8 MG: 4 TABLET ORAL at 12:46

## 2022-12-10 RX ADMIN — IBUPROFEN 800 MG: 800 TABLET, FILM COATED ORAL at 12:47

## 2022-12-10 ASSESSMENT — ENCOUNTER SYMPTOMS
VOMITING: 0
DIARRHEA: 0
SORE THROAT: 0
SHORTNESS OF BREATH: 0
COUGH: 1

## 2022-12-10 ASSESSMENT — PAIN SCALES - GENERAL
PAINLEVEL_OUTOF10: 8
PAINLEVEL_OUTOF10: 8

## 2022-12-10 ASSESSMENT — PAIN - FUNCTIONAL ASSESSMENT: PAIN_FUNCTIONAL_ASSESSMENT: 0-10

## 2022-12-10 NOTE — ED PROVIDER NOTES
[facility] ED  [address]  [place] [address]  Phone: [phone]        [facility] ED  EMERGENCY DEPARTMENT ENCOUNTER      Pt Name: Letitia Howe  MRN: [id number]  [place] 1945  Date of evaluation: 12/10/2022  Provider: Fern Edwards DO    CHIEF COMPLAINT       Chief Complaint   Patient presents with    Cough    Pharyngitis    Fever         HISTORY OF PRESENT ILLNESS   (Location/Symptom, Timing/Onset,Context/Setting, Quality, Duration, Modifying Factors, Severity)  Note limiting factors. Letitia Howe is a 65 y.o. female who presents to the emergency department for the evaluation of cough, sore throat and fever. Patient has been having symptoms for about 3 days. Her son serves as primary / but she does not speak much [place]. There is been no vomiting or diarrhea. She took Tylenol yesterday without much relief. No recent travel. No sick contacts at home. She has been taking some cough medicine but does not feel like this is helping much either. No shortness of breath at this time. She does get some tightness in the chest with cough but no chest pain at rest and no exertional dyspnea    Nursing Notes were reviewed. REVIEW OF SYSTEMS    (2-9systems for level 4, 10 or more for level 5)     Review of Systems   Constitutional:  Positive for chills and fever. HENT:  Negative for sore throat. Respiratory:  Positive for cough. Negative for shortness of breath. Cardiovascular:  Negative for chest pain. Gastrointestinal:  Negative for diarrhea and vomiting. Genitourinary:  Negative for dysuria. Musculoskeletal:  Positive for myalgias. Skin:  Negative for rash. Neurological:  Negative for weakness. All other systems reviewed and are negative. Except asnoted above the remainder of the review of systems was reviewed and negative.        PAST MEDICAL HISTORY     Past Medical History:   Diagnosis Date    Chronic kidney disease     family denies    Diabetes mellitus ([facility] [address]     borderline diabetic    HTN (hypertension)          SURGICAL HISTORY       Past Surgical History:   Procedure Laterality Date    EXTERNAL EAR SURGERY      20 years ago         CURRENT MEDICATIONS     Previous Medications    BLOOD GLUCOSE MONITOR KIT AND SUPPLIES    Test 2 times a day & as needed for symptoms of irregular blood glucose. CLINDAMYCIN (CLEOCIN) 2 % VAGINAL CREAM    Place vaginally nightly for 14 nights    DIMENHYDRINATE (DRAMAMINE) 50 MG TABLET    Take 50 mg by mouth nightly as needed    ESCITALOPRAM (LEXAPRO) 10 MG TABLET    Take 1 tablet by mouth daily    FLUTICASONE (FLONASE) 50 MCG/ACT NASAL SPRAY    1 spray by Each Nostril route daily    LEVOTHYROXINE (EUTHYROX) 88 MCG TABLET    Take 1 tablet by mouth Daily    MECLIZINE (ANTIVERT) 25 MG TABLET    Take 1 tablet by mouth 3 times daily as needed for Dizziness    METFORMIN (GLUCOPHAGE) 500 MG TABLET    Take 1 tablet by mouth 2 times daily (with meals)    OMEPRAZOLE (PRILOSEC) 20 MG DELAYED RELEASE CAPSULE    Take 1 capsule by mouth Daily    ONETOUCH VERIO STRIP    use 1 TEST STRIP to TEST BLOOD SUGAR twice a day    PAROXETINE (PAXIL) 20 MG TABLET    Take 20 mg by mouth every morning    PRIMIDONE (MYSOLINE) 50 MG TABLET    take 1/2 tablet by mouth at bedtime for TREMORS MAY INCREASE TO 1 tablet at bedtime if needed    PROPRANOLOL (INDERAL LA) 60 MG EXTENDED RELEASE CAPSULE    Take 1 capsule by mouth daily    TRIAMCINOLONE (KENALOG) 0.1 % CREAM    Apply topically 2 times daily. TRIAMTERENE-HYDROCHLOROTHIAZIDE (MAXZIDE-25) 37.5-25 MG PER TABLET    Take 1 tablet by mouth daily       ALLERGIES     Patient has no known allergies. FAMILY HISTORY       Family History   Problem Relation Age of Onset    Cataracts Neg Hx     Diabetes Neg Hx     Glaucoma Neg Hx           SOCIAL HISTORY       Social History     Socioeconomic History    Marital status:     Tobacco Use    Smoking status: Never Smokeless tobacco: Never   Vaping Use    Vaping Use: Never used   Substance and Sexual Activity    Alcohol use: Not Currently    Drug use: No    Sexual activity: Not Currently     Partners: Male       SCREENINGS    [place] Coma Scale  Eye Opening: Spontaneous  Best Verbal Response: Oriented  Best Motor Response: Obeys commands  [place] Coma Scale Score: 15        PHYSICAL EXAM    (up to 7 for level 4, 8 or more for level 5)     ED Triage Vitals [12/10/22 1230]   BP Temp Temp Source Heart Rate Resp SpO2 Height Weight   124/63 (!) 100.7 °F (38.2 °C) Tympanic 92 16 96 % -- 165 lb (74.8 kg)       Physical Exam  Vitals and nursing note reviewed. Constitutional:       General: She is not in acute distress. Appearance: She is not ill-appearing or toxic-appearing. HENT:      Head: Normocephalic and atraumatic. Mouth/Throat:      Pharynx: Posterior oropharyngeal erythema present. Tonsils: 0 on the right. 0 on the left. Eyes:      Conjunctiva/sclera: Conjunctivae normal.   Cardiovascular:      Rate and Rhythm: Normal rate. Pulses: Normal pulses. Pulmonary:      Effort: Pulmonary effort is normal. No respiratory distress. Breath sounds: No wheezing. Abdominal:      General: There is no distension. Musculoskeletal:         General: No deformity or signs of injury. Skin:     General: Skin is warm and dry. Neurological:      General: No focal deficit present. Mental Status: She is alert. Gait: Gait normal.      Comments: No Facial asymmetry, speaking normal.        EMERGENCY DEPARTMENT COURSE and DIFFERENTIAL DIAGNOSIS/MDM:   Vitals:    Vitals:    12/10/22 1230   BP: 124/63   Pulse: 92   Resp: 16   Temp: (!) 100.7 °F (38.2 °C)   TempSrc: Tympanic   SpO2: 96%   Weight: 165 lb (74.8 kg)       Patient presents to the emergency department with the complaints described above. Vital signs show a low fever, she is not tachycardic. She is nontoxic, no distress.   I am going to get influenza and COVID swabs and I am ordering some Tylenol and Decadron and will reevaluate      DIAGNOSTIC RESULTS     LABS:  Labs Reviewed   COVID-19, RAPID - Abnormal; Notable for the following components:       Result Value    SARS-CoV-2, Rapid DETECTED (*)     All other components within normal limits   RAPID INFLUENZA A/B ANTIGENS       All other labs were within normal range or not returned as of this dictation. RADIOLOGY:  No orders to display         ED Course as of 12/10/22 1311   Sat Dec 10, 2022   1305 COVID was positive, influenza negative. Patient improved in ED. Will discuss possibility of Paxlovid for home treatment [TS]   1307 At this time the patient is without objective evidence of an acute process requiring hospitalization or inpatient management. They have remained hemodynamically stable and are stable for discharge with outpatient follow-up. Standard anticipatory guidance given to patient upon discharge. Have given them a specific time frame in which to follow-up and who to follow-up with. I have also advised them that they should return to the emergency department if they get worse, or not getting better or develop any new or concerning symptoms. Patient demonstrates understanding.   [TS]      ED Course User Index  [TS] Cindy Callahan DO         PROCEDURES:  Unless otherwise noted below, none     Procedures    FINAL IMPRESSION      1. COVID-19          DISPOSITION/PLAN   DISPOSITION Decision To Discharge 12/10/2022 01:09:55 PM      PATIENT REFERRED TO:  Drake Self MD  [address]  [phone]    In 3 days      DISCHARGE MEDICATIONS:  New Prescriptions    NIRMATRELVIR/RITONAVIR (PAXLOVID, 300/100,) 20 X 150 MG & 10 X 100MG TBPK    Take 3 tablets (two 150 mg nirmatrelvir and one 100 mg ritonavir tablets) by mouth every 12 hours for 5 days.     PROMETHAZINE-DEXTROMETHORPHAN (PROMETHAZINE-DM) 6.25-15 MG/5ML SYRUP    Take 5 mLs by mouth 4 times daily as needed for Cough          (Please note that portions of this note were completed with a voice recognition program.  Efforts were made to edit the dictations but occasionally words are mis-transcribed.)    Cindy Callahan DO,(electronically signed)  Board Certified Emergency Physician         Cindy Callahan DO  12/10/22 6038

## 2022-12-20 ENCOUNTER — TELEPHONE (OUTPATIENT)
Dept: INTERNAL MEDICINE | Age: 77
End: 2022-12-20

## 2022-12-20 RX ORDER — ESCITALOPRAM OXALATE 10 MG/1
10 TABLET ORAL DAILY
Qty: 30 TABLET | Refills: 0 | Status: SHIPPED | OUTPATIENT
Start: 2022-12-20

## 2022-12-20 NOTE — TELEPHONE ENCOUNTER
If similar symptoms were previously well controlled with lexapro, can restart this if patient is agreeable. Advise to schedule follow up follow up with Dr. Bran Last.  Do not take paxil while taking lexapro, med list updated

## 2022-12-20 NOTE — TELEPHONE ENCOUNTER
Spoke with son Alexys Brian)- informed of Lexapro script; and to not take Paxil if she has any on hand. Son voices understanding.   Appt scheduled for 1/17/23 at 1:30 pm.

## 2022-12-20 NOTE — TELEPHONE ENCOUNTER
Med history shows:   1)Lexapro 10 mg one daily; started 7/16/2020. No discontinue date; just never refilled. 2) Paxil 20 mg one every morning; started 3/23/2020. Re-ordered 4/28/2020. No discontinue date; just not reordered.

## 2022-12-20 NOTE — TELEPHONE ENCOUNTER
Patient was on lexapro and Paroxetine in the past.  Son, Jermaine Ballesteros feels she needs it again. Can you send a script for one of these for her to try again as she is not resting well at all.   Uses [facility]

## 2023-01-16 RX ORDER — ASPIRIN 325 MG
325 TABLET ORAL DAILY
COMMUNITY

## 2023-01-17 ENCOUNTER — HOSPITAL ENCOUNTER (OUTPATIENT)
Age: 78
Discharge: HOME | End: 2023-01-17

## 2023-01-17 ENCOUNTER — OFFICE VISIT (OUTPATIENT)
Dept: INTERNAL MEDICINE | Age: 78
End: 2023-01-17

## 2023-01-17 VITALS
OXYGEN SATURATION: 97 % | BODY MASS INDEX: 26.99 KG/M2 | SYSTOLIC BLOOD PRESSURE: 138 MMHG | RESPIRATION RATE: 16 BRPM | WEIGHT: 162 LBS | HEART RATE: 78 BPM | HEIGHT: 65 IN | DIASTOLIC BLOOD PRESSURE: 72 MMHG

## 2023-01-17 DIAGNOSIS — N18.30 STAGE 3 CHRONIC KIDNEY DISEASE, UNSPECIFIED WHETHER STAGE 3A OR 3B CKD (HCC): ICD-10-CM

## 2023-01-17 DIAGNOSIS — R73.03 PREDIABETES: Primary | ICD-10-CM

## 2023-01-17 DIAGNOSIS — R73.03 PREDIABETES: ICD-10-CM

## 2023-01-17 DIAGNOSIS — I10 HYPERTENSION, UNSPECIFIED TYPE: ICD-10-CM

## 2023-01-17 DIAGNOSIS — F41.9 ANXIETY AND DEPRESSION: ICD-10-CM

## 2023-01-17 DIAGNOSIS — Z82.0 FAMILY HISTORY OF TREMOR: ICD-10-CM

## 2023-01-17 DIAGNOSIS — R42 DIZZINESS: ICD-10-CM

## 2023-01-17 DIAGNOSIS — F32.A ANXIETY AND DEPRESSION: ICD-10-CM

## 2023-01-17 DIAGNOSIS — G25.0 BENIGN ESSENTIAL TREMOR: ICD-10-CM

## 2023-01-17 DIAGNOSIS — R73.9 HYPERGLYCEMIA: ICD-10-CM

## 2023-01-17 DIAGNOSIS — G89.29 CHRONIC NECK PAIN: ICD-10-CM

## 2023-01-17 DIAGNOSIS — E03.9 HYPOTHYROIDISM, UNSPECIFIED TYPE: ICD-10-CM

## 2023-01-17 DIAGNOSIS — N18.30 CKD (CHRONIC KIDNEY DISEASE) STAGE 3, GFR 30-59 ML/MIN (HCC): ICD-10-CM

## 2023-01-17 DIAGNOSIS — Z87.898 HISTORY OF DIZZINESS: ICD-10-CM

## 2023-01-17 DIAGNOSIS — R26.89 BALANCE PROBLEM: ICD-10-CM

## 2023-01-17 DIAGNOSIS — R42 VERTIGO: ICD-10-CM

## 2023-01-17 DIAGNOSIS — M54.2 CHRONIC NECK PAIN: ICD-10-CM

## 2023-01-17 LAB
ABSOLUTE EOS #: 0.09 K/UL (ref 0–0.44)
ABSOLUTE IMMATURE GRANULOCYTE: 0.03 K/UL (ref 0–0.3)
ABSOLUTE LYMPH #: 2.67 K/UL (ref 1.1–3.7)
ABSOLUTE MONO #: 0.55 K/UL (ref 0.1–1.2)
ALBUMIN SERPL-MCNC: 4.2 G/DL (ref 3.5–5.2)
ALBUMIN/GLOBULIN RATIO: 1.3 (ref 1–2.5)
ALP BLD-CCNC: 60 U/L (ref 35–104)
ALT SERPL-CCNC: 20 U/L (ref 5–33)
ANION GAP SERPL CALCULATED.3IONS-SCNC: 10 MMOL/L (ref 9–17)
AST SERPL-CCNC: 18 U/L
BASOPHILS # BLD: 1 % (ref 0–2)
BASOPHILS ABSOLUTE: 0.05 K/UL (ref 0–0.2)
BILIRUB SERPL-MCNC: 0.3 MG/DL (ref 0.3–1.2)
BUN BLDV-MCNC: 24 MG/DL (ref 8–23)
BUN/CREAT BLD: 23 (ref 9–20)
CALCIUM SERPL-MCNC: 9.5 MG/DL (ref 8.6–10.4)
CHLORIDE BLD-SCNC: 101 MMOL/L (ref 98–107)
CO2: 27 MMOL/L (ref 20–31)
CREAT SERPL-MCNC: 1.05 MG/DL (ref 0.5–0.9)
EOSINOPHILS RELATIVE PERCENT: 1 % (ref 1–4)
GFR SERPL CREATININE-BSD FRML MDRD: 54 ML/MIN/1.73M2
GLUCOSE BLD-MCNC: 110 MG/DL (ref 70–99)
HCT VFR BLD CALC: 37.7 % (ref 36.3–47.1)
HEMOGLOBIN: 12.3 G/DL (ref 11.9–15.1)
IMMATURE GRANULOCYTES: 0 %
LYMPHOCYTES # BLD: 37 % (ref 24–43)
MCH RBC QN AUTO: 28.9 PG (ref 25.2–33.5)
MCHC RBC AUTO-ENTMCNC: 32.6 G/DL (ref 25.2–33.5)
MCV RBC AUTO: 88.7 FL (ref 82.6–102.9)
MONOCYTES # BLD: 8 % (ref 3–12)
NRBC AUTOMATED: 0 PER 100 WBC
PDW BLD-RTO: 15.2 % (ref 11.8–14.4)
PLATELET # BLD: 252 K/UL (ref 138–453)
PMV BLD AUTO: 11.1 FL (ref 8.1–13.5)
POTASSIUM SERPL-SCNC: 4.2 MMOL/L (ref 3.7–5.3)
RBC # BLD: 4.25 M/UL (ref 3.95–5.11)
RBC # BLD: ABNORMAL 10*6/UL
SEG NEUTROPHILS: 53 % (ref 36–65)
SEGMENTED NEUTROPHILS ABSOLUTE COUNT: 3.83 K/UL (ref 1.5–8.1)
SODIUM BLD-SCNC: 138 MMOL/L (ref 135–144)
TOTAL PROTEIN: 7.5 G/DL (ref 6.4–8.3)
TSH SERPL DL<=0.05 MIU/L-ACNC: 1.31 UIU/ML (ref 0.3–5)
VITAMIN B-12: 467 PG/ML (ref 232–1245)
VITAMIN D 25-HYDROXY: 33.1 NG/ML
WBC # BLD: 7.2 K/UL (ref 3.5–11.3)

## 2023-01-17 PROCEDURE — 80053 COMPREHEN METABOLIC PANEL: CPT

## 2023-01-17 PROCEDURE — 85025 COMPLETE CBC W/AUTO DIFF WBC: CPT

## 2023-01-17 PROCEDURE — 82306 VITAMIN D 25 HYDROXY: CPT

## 2023-01-17 PROCEDURE — G0008 ADMIN INFLUENZA VIRUS VAC: HCPCS | Performed by: INTERNAL MEDICINE

## 2023-01-17 PROCEDURE — 84443 ASSAY THYROID STIM HORMONE: CPT

## 2023-01-17 PROCEDURE — 99212 OFFICE O/P EST SF 10 MIN: CPT | Performed by: INTERNAL MEDICINE

## 2023-01-17 PROCEDURE — 82607 VITAMIN B-12: CPT

## 2023-01-17 PROCEDURE — 83036 HEMOGLOBIN GLYCOSYLATED A1C: CPT

## 2023-01-17 PROCEDURE — 36415 COLL VENOUS BLD VENIPUNCTURE: CPT

## 2023-01-17 RX ORDER — CLINDAMYCIN PHOSPHATE 20 MG/G
CREAM VAGINAL
Qty: 2 EACH | Refills: 0 | Status: CANCELLED | OUTPATIENT
Start: 2023-01-17

## 2023-01-17 RX ORDER — OMEPRAZOLE 20 MG/1
20 CAPSULE, DELAYED RELEASE ORAL DAILY
Qty: 90 CAPSULE | Refills: 3 | Status: SHIPPED | OUTPATIENT
Start: 2023-01-17

## 2023-01-17 RX ORDER — MECLIZINE HYDROCHLORIDE 25 MG/1
25 TABLET ORAL 3 TIMES DAILY PRN
Qty: 60 TABLET | Refills: 3 | Status: CANCELLED | OUTPATIENT
Start: 2023-01-17

## 2023-01-17 RX ORDER — TRIAMTERENE AND HYDROCHLOROTHIAZIDE 37.5; 25 MG/1; MG/1
1 TABLET ORAL DAILY
Qty: 90 TABLET | Refills: 3 | Status: CANCELLED | OUTPATIENT
Start: 2023-01-17

## 2023-01-17 RX ORDER — TRIAMCINOLONE ACETONIDE 1 MG/G
CREAM TOPICAL
Qty: 30 G | Refills: 0 | Status: CANCELLED | OUTPATIENT
Start: 2023-01-17

## 2023-01-17 RX ORDER — LEVOTHYROXINE SODIUM 88 UG/1
88 TABLET ORAL DAILY
Qty: 90 TABLET | Refills: 3 | Status: CANCELLED | OUTPATIENT
Start: 2023-01-17

## 2023-01-17 RX ORDER — AMOXICILLIN AND CLAVULANATE POTASSIUM 875; 125 MG/1; MG/1
1 TABLET, FILM COATED ORAL 2 TIMES DAILY
Qty: 14 TABLET | Refills: 0 | Status: SHIPPED | OUTPATIENT
Start: 2023-01-17 | End: 2023-01-24

## 2023-01-17 RX ORDER — LOSARTAN POTASSIUM 50 MG/1
50 TABLET ORAL DAILY
Qty: 30 TABLET | Refills: 5 | Status: SHIPPED | OUTPATIENT
Start: 2023-01-17

## 2023-01-17 RX ORDER — HYDROCHLOROTHIAZIDE 12.5 MG/1
12.5 CAPSULE, GELATIN COATED ORAL EVERY MORNING
Qty: 90 CAPSULE | Refills: 1 | Status: SHIPPED | OUTPATIENT
Start: 2023-01-17

## 2023-01-17 RX ORDER — FLUTICASONE PROPIONATE 50 MCG
1 SPRAY, SUSPENSION (ML) NASAL DAILY
Qty: 3 EACH | Refills: 5 | Status: SHIPPED | OUTPATIENT
Start: 2023-01-17

## 2023-01-17 RX ORDER — PROPRANOLOL HCL 60 MG
60 CAPSULE, EXTENDED RELEASE 24HR ORAL DAILY
Qty: 90 CAPSULE | Refills: 3 | Status: CANCELLED | OUTPATIENT
Start: 2023-01-17

## 2023-01-17 RX ORDER — ESOMEPRAZOLE MAGNESIUM 40 MG/1
40 CAPSULE, DELAYED RELEASE ORAL
Qty: 90 CAPSULE | Refills: 1 | Status: SHIPPED | OUTPATIENT
Start: 2023-01-17

## 2023-01-17 RX ORDER — DIMENHYDRINATE 50 MG/1
50 TABLET ORAL NIGHTLY PRN
Status: CANCELLED | OUTPATIENT
Start: 2023-01-17

## 2023-01-17 RX ORDER — PRIMIDONE 50 MG/1
TABLET ORAL
Qty: 30 TABLET | Refills: 1 | Status: CANCELLED | OUTPATIENT
Start: 2023-01-17

## 2023-01-17 RX ORDER — ESCITALOPRAM OXALATE 20 MG/1
20 TABLET ORAL DAILY
Qty: 90 TABLET | Refills: 1 | Status: SHIPPED | OUTPATIENT
Start: 2023-01-17

## 2023-01-17 ASSESSMENT — PATIENT HEALTH QUESTIONNAIRE - PHQ9
5. POOR APPETITE OR OVEREATING: 0
3. TROUBLE FALLING OR STAYING ASLEEP: 0
6. FEELING BAD ABOUT YOURSELF - OR THAT YOU ARE A FAILURE OR HAVE LET YOURSELF OR YOUR FAMILY DOWN: 0
SUM OF ALL RESPONSES TO PHQ QUESTIONS 1-9: 0
SUM OF ALL RESPONSES TO PHQ QUESTIONS 1-9: 0
2. FEELING DOWN, DEPRESSED OR HOPELESS: 0
8. MOVING OR SPEAKING SO SLOWLY THAT OTHER PEOPLE COULD HAVE NOTICED. OR THE OPPOSITE, BEING SO FIGETY OR RESTLESS THAT YOU HAVE BEEN MOVING AROUND A LOT MORE THAN USUAL: 0
1. LITTLE INTEREST OR PLEASURE IN DOING THINGS: 0
10. IF YOU CHECKED OFF ANY PROBLEMS, HOW DIFFICULT HAVE THESE PROBLEMS MADE IT FOR YOU TO DO YOUR WORK, TAKE CARE OF THINGS AT HOME, OR GET ALONG WITH OTHER PEOPLE: 0
SUM OF ALL RESPONSES TO PHQ QUESTIONS 1-9: 0
SUM OF ALL RESPONSES TO PHQ QUESTIONS 1-9: 0
SUM OF ALL RESPONSES TO PHQ9 QUESTIONS 1 & 2: 0
4. FEELING TIRED OR HAVING LITTLE ENERGY: 0
7. TROUBLE CONCENTRATING ON THINGS, SUCH AS READING THE NEWSPAPER OR WATCHING TELEVISION: 0
9. THOUGHTS THAT YOU WOULD BE BETTER OFF DEAD, OR OF HURTING YOURSELF: 0

## 2023-01-18 LAB
ESTIMATED AVERAGE GLUCOSE: 160 MG/DL
HBA1C MFR BLD: 7.2 % (ref 4–6)

## 2023-01-18 NOTE — PROGRESS NOTES
[place] [address]  Dept: [phone]  Dept Fax: [phone]  Loc: [phone]     Visit Date:  1/17/2023  Patient:  Odalis Mckeon  YOB: 1945  Provider: Sissy Roca MD    Assessment & Plan      Hypothyroidism: Reports tremors as well as anxiety, which are possibly signs of thyrotoxicosis. Continue standard thyroxine, however will check TFTs. Hypertension: Blood pressure controlled. Continue hydrochlorothiazide 12.5 mg as well as losartan 50 mg.    Prediabetes: We will continue to monitor. Continue metformin    Anxiety: We will increase Lexapro to 20 mg daily. Reassess next visit. Diagnosis Orders   1. Prediabetes  Hemoglobin A1C      2. Hypertension, unspecified type  CBC with Auto Differential    Comprehensive Metabolic Panel      3. Anxiety and depression        4. Benign essential tremor        5. Family history of tremor        6. Balance problem        7. Dizziness        8. Hyperglycemia        9. CKD (chronic kidney disease) stage 3, GFR 30-59 ml/min ([facility])  Vitamin D 25 Hydroxy      10. Hypothyroidism, unspecified type  TSH with Reflex      11. Chronic neck pain        12. Vertigo        13. History of dizziness  Vitamin B12        Discussed use, benefit, and side effects of prescribed medications. All questions answered. Patient voiced understanding. Reviewed health maintenance. HPI:     She was seen in the internal medicine office today for   Chief Complaint   Patient presents with    Other     Restarted the lexapro on 12/20/22. Patient think she need stronger        Presents to follow-up. Has a history of hypothyroidism for which she uses levothyroxine. However, she reports anxiety as well as tremors in her hands. She thought that she might have Parkinson's disease, however she does not seem to be exhibiting resting tremor, no postural tremor.   She denies diarrhea or constipation. I advised that this could be caused by thyrotoxicosis and that we need to check TFTs before making a decision. Moreover, she reports that she has been anxious, has been on the Lexapro for the last month. However she thinks she may need a higher dose, she still feels anxious somewhat. I advised that we can increase dose to 20 mg and reassess. Has a history of hypertension which has been unchanged.     Has a history of prediabetes has been unchanged      Subjective:      REVIEW OF SYSTEMS    Constitutional: Denies fever, chills  Eyes: Denies recent vision changes  Cardiovascular: Denies chest pain, LL edema  Respiratory: Denies cough, wheezes  Gastrointestinal: Denies abdominal pain, nausea, vomiting  Skin: Denies rash  Endocrine: Denies polyuria  Hematologic: Denies bruising  Genitourinary: Denies dysuria, hematuria  Neurological: Denies headache, numbness      Medications    Current Outpatient Medications:     escitalopram (LEXAPRO) 20 MG tablet, Take 1 tablet by mouth daily, Disp: 90 tablet, Rfl: 1    omeprazole (PRILOSEC) 20 MG delayed release capsule, Take 1 capsule by mouth Daily, Disp: 90 capsule, Rfl: 3    metFORMIN (GLUCOPHAGE) 500 MG tablet, Take 1 tablet by mouth 2 times daily (with meals), Disp: 180 tablet, Rfl: 3    fluticasone (FLONASE) 50 MCG/ACT nasal spray, 1 spray by Each Nostril route daily, Disp: 3 each, Rfl: 5    losartan (COZAAR) 50 MG tablet, Take 1 tablet by mouth daily, Disp: 30 tablet, Rfl: 5    hydroCHLOROthiazide (MICROZIDE) 12.5 MG capsule, Take 1 capsule by mouth every morning, Disp: 90 capsule, Rfl: 1    esomeprazole (NEXIUM) 40 MG delayed release capsule, Take 1 capsule by mouth every morning (before breakfast), Disp: 90 capsule, Rfl: 1    amoxicillin-clavulanate (AUGMENTIN) 875-125 MG per tablet, Take 1 tablet by mouth 2 times daily for 7 days, Disp: 14 tablet, Rfl: 0    aspirin 325 MG tablet, Take 325 mg by mouth daily, Disp: , Rfl:     levothyroxine (EUTHYROX) 88 MCG tablet, Take 1 tablet by mouth Daily, Disp: 90 tablet, Rfl: 3    triamterene-hydroCHLOROthiazide (MAXZIDE-25) 37.5-25 MG per tablet, Take 1 tablet by mouth daily, Disp: 90 tablet, Rfl: 3    propranolol (INDERAL LA) 60 MG extended release capsule, Take 1 capsule by mouth daily, Disp: 90 capsule, Rfl: 3    meclizine (ANTIVERT) 25 MG tablet, Take 1 tablet by mouth 3 times daily as needed for Dizziness, Disp: 60 tablet, Rfl: 3    clindamycin (CLEOCIN) 2 % vaginal cream, Place vaginally nightly for 14 nights, Disp: 2 Tube, Rfl: 0    primidone (MYSOLINE) 50 MG tablet, take 1/2 tablet by mouth at bedtime for TREMORS MAY INCREASE TO 1 tablet at bedtime if needed, Disp: 30 tablet, Rfl: 1    triamcinolone (KENALOG) 0.1 % cream, Apply topically 2 times daily. , Disp: 30 g, Rfl: 0    ONETOUCH VERIO strip, use 1 TEST STRIP to TEST BLOOD SUGAR twice a day, Disp: 100 strip, Rfl: 3    blood glucose monitor kit and supplies, Test 2 times a day & as needed for symptoms of irregular blood glucose., Disp: 1 kit, Rfl: 0    dimenhyDRINATE (DRAMAMINE) 50 MG tablet, Take 50 mg by mouth nightly as needed, Disp: , Rfl:     The patient has No Known Allergies. Past Medical History  Sheri Cole  has a past medical history of Chronic kidney disease, Diabetes mellitus ([address] and HTN (hypertension). Past Surgical History  The patient  has a past surgical history that includes External ear surgery. Family History  This patient's family history is not on file. Social History  Sheri Cole  reports that she has never smoked. She has never used smokeless tobacco. She reports that she does not currently use alcohol. She reports that she does not use drugs.     Health Maintenance:    Health Maintenance   Topic Date Due    Hepatitis C screen  Never done    DTaP/Tdap/Td vaccine (1 - Tdap) Never done    DEXA (modify frequency per FRAX score)  Never done    Pneumococcal 65+ years Vaccine (1 - PCV) Never done    Shingles vaccine (2 of 2) 08/15/2020    COVID-19 Vaccine (3 - Booster for Pfizer series) 12/12/2021    Depression Monitoring  01/17/2024    Flu vaccine  Completed    Hepatitis A vaccine  Aged Out    Hib vaccine  Aged Out    Meningococcal (ACWY) vaccine  Aged Out           Objective:     PHYSICAL EXAM  /72 (Site: Left Upper Arm, Position: Sitting, Cuff Size: Medium Adult)   Pulse 78   Resp 16   Ht 5' 5\" (1.651 m)   Wt 162 lb (73.5 kg)   SpO2 97%   BMI 26.96 kg/m²   Constitutional: No acute distress. Sits in chair comfortably  Eyes: Sclerae nonicteric. No lid lag or proptosis  HENT: External ears normal. No external lesions on the nose  Neck: No gross masses. Trachea visibly midline  Respiratory: Good air entry bilaterally. No wheezing or crackles  Cardiovascular: Normal S1-S2. No murmurs. No lower extremity edema  Gastrointestinal: No visible masses. No visible hernias  Skin: No abnormal rashes. No abnormal masses  Neurologic: Cranial nerves grossly intact  Psychiatric: Normal affect. Alert and oriented        Labs Reviewed 1/17/2023:    Lab Results   Component Value Date    WBC 7.2 01/17/2023    HGB 12.3 01/17/2023    HCT 37.7 01/17/2023     01/17/2023    ALT 20 01/17/2023    AST 18 01/17/2023     01/17/2023    K 4.2 01/17/2023     01/17/2023    CREATININE 1.05 (H) 01/17/2023    BUN 24 (H) 01/17/2023    CO2 27 01/17/2023    TSH 1.31 01/17/2023    INR 0.9 11/23/2015            Electronically signed Caitie ANN MD on 1/17/2023 at 1:53 PM      This note has been created using the Epic electronic health record, and dictated in part by [facility] dictation system. Despite the documenting physician's best efforts, there may be errors in spelling, grammar or syntax.

## 2023-01-19 RX ORDER — LEVOTHYROXINE SODIUM 88 UG/1
88 TABLET ORAL DAILY
Qty: 90 TABLET | Refills: 3 | Status: SHIPPED | OUTPATIENT
Start: 2023-01-19

## 2023-03-07 ENCOUNTER — HOSPITAL ENCOUNTER (OUTPATIENT)
Dept: GENERAL RADIOLOGY | Age: 78
Discharge: HOME | End: 2023-03-09

## 2023-03-07 ENCOUNTER — OFFICE VISIT (OUTPATIENT)
Dept: ORTHOPEDIC SURGERY | Age: 78
End: 2023-03-07

## 2023-03-07 VITALS
DIASTOLIC BLOOD PRESSURE: 60 MMHG | WEIGHT: 162 LBS | RESPIRATION RATE: 16 BRPM | HEIGHT: 65 IN | HEART RATE: 73 BPM | OXYGEN SATURATION: 97 % | SYSTOLIC BLOOD PRESSURE: 110 MMHG | BODY MASS INDEX: 26.99 KG/M2

## 2023-03-07 DIAGNOSIS — M17.12 PRIMARY OSTEOARTHRITIS OF LEFT KNEE: Primary | ICD-10-CM

## 2023-03-07 DIAGNOSIS — M17.0 OSTEOARTHRITIS OF BOTH KNEES, UNSPECIFIED OSTEOARTHRITIS TYPE: Primary | ICD-10-CM

## 2023-03-07 DIAGNOSIS — M17.12 PRIMARY OSTEOARTHRITIS OF LEFT KNEE: ICD-10-CM

## 2023-03-07 DIAGNOSIS — M17.11 PRIMARY OSTEOARTHRITIS OF RIGHT KNEE: ICD-10-CM

## 2023-03-07 PROCEDURE — 73562 X-RAY EXAM OF KNEE 3: CPT

## 2023-03-07 PROCEDURE — 20610 DRAIN/INJ JOINT/BURSA W/O US: CPT | Performed by: PHYSICIAN ASSISTANT

## 2023-03-07 PROCEDURE — 99999 PR OFFICE/OUTPT VISIT,PROCEDURE ONLY: CPT | Performed by: PHYSICIAN ASSISTANT

## 2023-03-07 PROCEDURE — PBSHW PBB SHADOW CHARGE: Performed by: PHYSICIAN ASSISTANT

## 2023-03-07 PROCEDURE — 99214 OFFICE O/P EST MOD 30 MIN: CPT | Performed by: ORTHOPAEDIC SURGERY

## 2023-03-07 RX ORDER — TRIAMCINOLONE ACETONIDE 40 MG/ML
80 INJECTION, SUSPENSION INTRA-ARTICULAR; INTRAMUSCULAR ONCE
Status: COMPLETED | OUTPATIENT
Start: 2023-03-07 | End: 2023-03-07

## 2023-03-07 RX ORDER — BUPIVACAINE HYDROCHLORIDE 5 MG/ML
5 INJECTION, SOLUTION PERINEURAL ONCE
Status: COMPLETED | OUTPATIENT
Start: 2023-03-07 | End: 2023-03-07

## 2023-03-07 RX ADMIN — BUPIVACAINE HYDROCHLORIDE 25 MG: 5 INJECTION, SOLUTION PERINEURAL at 09:12

## 2023-03-07 RX ADMIN — TRIAMCINOLONE ACETONIDE 80 MG: 200 INJECTION, SUSPENSION INTRA-ARTICULAR; INTRAMUSCULAR at 09:12

## 2023-03-07 RX ADMIN — BUPIVACAINE HYDROCHLORIDE 25 MG: 5 INJECTION, SOLUTION PERINEURAL at 09:11

## 2023-03-07 NOTE — PROGRESS NOTES
Orthopaedic Progress Note      CHIEF COMPLAINT: Bilateral knee primary osteoarthritis    HISTORY OF PRESENT ILLNESS:       Ms. Bernabe Quezada  is a 77 y.o. female  who presents with bilateral knee primary osteoarthritis. Patient has been having pain for several months now. Patient's received intra-articular corticosteroid injections before in the past.  This did help her out lasted approximately 1 year. Does have difficulty getting seated position does have crepitus with flexion-extension knee. She is here today with her .       Past Medical History:    Past Medical History:   Diagnosis Date    Chronic kidney disease     family denies    Diabetes mellitus ([address]     borderline diabetic    HTN (hypertension)        Past Surgical History:    Past Surgical History:   Procedure Laterality Date    EXTERNAL EAR SURGERY      20 years ago         Current  Medications:  Current Outpatient Medications   Medication Sig Dispense Refill    levothyroxine (EUTHYROX) 88 MCG tablet Take 1 tablet by mouth Daily 90 tablet 3    escitalopram (LEXAPRO) 20 MG tablet Take 1 tablet by mouth daily 90 tablet 1    omeprazole (PRILOSEC) 20 MG delayed release capsule Take 1 capsule by mouth Daily 90 capsule 3    metFORMIN (GLUCOPHAGE) 500 MG tablet Take 1 tablet by mouth 2 times daily (with meals) 180 tablet 3    fluticasone (FLONASE) 50 MCG/ACT nasal spray 1 spray by Each Nostril route daily 3 each 5    losartan (COZAAR) 50 MG tablet Take 1 tablet by mouth daily 30 tablet 5    hydroCHLOROthiazide (MICROZIDE) 12.5 MG capsule Take 1 capsule by mouth every morning 90 capsule 1    esomeprazole (NEXIUM) 40 MG delayed release capsule Take 1 capsule by mouth every morning (before breakfast) 90 capsule 1    aspirin 325 MG tablet Take 325 mg by mouth daily      triamterene-hydroCHLOROthiazide (MAXZIDE-25) 37.5-25 MG per tablet Take 1 tablet by mouth daily 90 tablet 3    propranolol (INDERAL LA) 60 MG extended release capsule Take 1 capsule by mouth daily 90 capsule 3    meclizine (ANTIVERT) 25 MG tablet Take 1 tablet by mouth 3 times daily as needed for Dizziness 60 tablet 3    clindamycin (CLEOCIN) 2 % vaginal cream Place vaginally nightly for 14 nights 2 Tube 0    primidone (MYSOLINE) 50 MG tablet take 1/2 tablet by mouth at bedtime for TREMORS MAY INCREASE TO 1 tablet at bedtime if needed 30 tablet 1    triamcinolone (KENALOG) 0.1 % cream Apply topically 2 times daily. 30 g 0    ONETOUCH VERIO strip use 1 TEST STRIP to TEST BLOOD SUGAR twice a day 100 strip 3    blood glucose monitor kit and supplies Test 2 times a day & as needed for symptoms of irregular blood glucose. 1 kit 0    dimenhyDRINATE (DRAMAMINE) 50 MG tablet Take 50 mg by mouth nightly as needed       Current Facility-Administered Medications   Medication Dose Route Frequency Provider Last Rate Last Admin    triamcinolone acetonide (KENALOG-40) injection 80 mg  80 mg Intra-artICUlar Once Carmella Jacob PA-C        bupivacaine (MARCAINE) 0.5 % injection 25 mg  5 mL Intra-artICUlar Once Carmella Jacob PA-C        triamcinolone acetonide (KENALOG-40) injection 80 mg  80 mg Intra-artICUlar Once Carmella Jacob PA-C        bupivacaine (MARCAINE) 0.5 % injection 25 mg  5 mL Intra-artICUlar Once Carmella Jacob PA-C           Allergies:  Patient has no known allergies. Social History:   Social History     Tobacco Use   Smoking Status Never   Smokeless Tobacco Never     Social History     Substance and Sexual Activity   Alcohol Use Not Currently     Social History     Substance and Sexual Activity   Drug Use No       Family History:  Family History   Problem Relation Age of Onset    Cataracts Neg Hx     Diabetes Neg Hx     Glaucoma Neg Hx        REVIEW OF SYSTEMS:  Constitutional: Denies any fever, chills. Musculoskeletal: Positive for primary knee osteoarthritis bilaterally. PHYSICAL EXAM:  [unfilled]  General appearance:  Alert and oriented x 3.  No apparent distress, appears stated age, calm and cooperative. Musculoskeletal: Crepitus noted flexion-extension knee negative valgus varus stress test negative anterior posterior drawer. Does have significant pain with deep knee flexion. DATA:  CBC:   Lab Results   Component Value Date/Time    WBC 7.2 01/17/2023 01:58 PM    HGB 12.3 01/17/2023 01:58 PM     01/17/2023 01:58 PM     BMP:    Lab Results   Component Value Date/Time     01/17/2023 01:58 PM    K 4.2 01/17/2023 01:58 PM     01/17/2023 01:58 PM    CO2 27 01/17/2023 01:58 PM    BUN 24 01/17/2023 01:58 PM    CREATININE 1.05 01/17/2023 01:58 PM    CALCIUM 9.5 01/17/2023 01:58 PM    GLUCOSE 110 01/17/2023 01:58 PM     PT/INR:    Lab Results   Component Value Date/Time    PROTIME 9.6 11/23/2015 11:18 AM    INR 0.9 11/23/2015 11:18 AM     Troponin:    Lab Results   Component Value Date/Time    TROPONINI 0.00 11/23/2015 12:39 PM     No results for input(s): LIPASE, AMYLASE in the last 72 hours. No results for input(s): AST, ALT, BILIDIR, BILITOT, ALKPHOS in the last 72 hours. Uric Acid:  No components found for: URIC  Urine Culture:  No components found for: CURINE    Radiology:   XR KNEE LEFT (3 VIEWS)    Result Date: 3/7/2023  EXAMINATION: THREE XRAY VIEWS OF THE LEFT KNEE 3/7/2023 8:14 am COMPARISON: None. HISTORY: ORDERING SYSTEM PROVIDED HISTORY: Primary osteoarthritis of left knee TECHNOLOGIST PROVIDED HISTORY: Reason for Exam: Arthritis FINDINGS: Knee alignment anatomic. No acute osseous abnormality generalized osteopenia. Moderate tricompartmental degenerative change most significant medial femorotibial compartment loss joint space and osteophyte formation. No significant joint effusion. Soft tissues unremarkable. No acute findings. Moderate osteoarthritis. XR KNEE RIGHT (3 VIEWS)    Result Date: 3/7/2023  EXAMINATION: THREE XRAY VIEWS OF THE RIGHT KNEE 3/7/2023 8:14 am COMPARISON: None.  HISTORY: ORDERING SYSTEM PROVIDED HISTORY: Primary osteoarthritis of right knee TECHNOLOGIST PROVIDED HISTORY: Reason for Exam: Arthritis FINDINGS: Knee alignment anatomic. No acute osseous abnormality. Generalized osteopenia. Moderate tricompartmental degenerative change most significant medial femorotibial compartment loss joint space and osteophyte formation. No significant joint effusion. Soft tissues unremarkable. No acute findings. Moderate osteoarthritis. X-rays personally reviewed by me of bilateral knee primary osteoarthritis more significant at the patellofemoral joints right greater than left noted on 3 views done here today       Diagnosis Orders   1. Osteoarthritis of both knees, unspecified osteoarthritis type  [place] ARTHROCENTESIS ASPIR&/INJ MAJOR JT/BURSA W/O US    triamcinolone acetonide (KENALOG-40) injection 80 mg    bupivacaine (MARCAINE) 0.5 % injection 25 mg    triamcinolone acetonide (KENALOG-40) injection 80 mg    bupivacaine (MARCAINE) 0.5 % injection 25 mg            PLAN:  Proceed with bilateral intra-articular corticosteroid injections risk and benefits were discussed with patient patient has elected to proceed.         Procedures    AZ ARTHROCENTESIS ASPIR&/INJ MAJOR JT/BURSA W/O US        Procedure:      Patient prepped in a sterile fashion with alcohol topical numbing spray 5 cc Marcaine 2 cc of Kenalog injected into bilateral inferolateral portals patient handled procedure well sterile Band-Aid was applied  Electronically signed by Home Meneses PA-C on 3/7/2023 at 9:09 AM

## 2023-05-18 RX ORDER — HYDROCHLOROTHIAZIDE 12.5 MG/1
12.5 CAPSULE, GELATIN COATED ORAL EVERY MORNING
Qty: 90 CAPSULE | Refills: 1 | Status: SHIPPED | OUTPATIENT
Start: 2023-05-18

## 2023-05-18 RX ORDER — ESCITALOPRAM OXALATE 20 MG/1
TABLET ORAL
Qty: 90 TABLET | Refills: 1 | Status: SHIPPED | OUTPATIENT
Start: 2023-05-18

## 2023-05-18 NOTE — TELEPHONE ENCOUNTER
Beatriz Abrams called requesting a refill of the below medication which has been pended for you:     Requested Prescriptions     Pending Prescriptions Disp Refills    escitalopram (LEXAPRO) 20 MG tablet [Pharmacy Med Name: ESCITALOPRAM 20 MG TABLET] 90 tablet 1     Sig: take 1 tablet by mouth once daily    hydroCHLOROthiazide (MICROZIDE) 12.5 MG capsule [Pharmacy Med Name: HYDROCHLOROTHIAZIDE 12.5 MG CP] 90 capsule 1     Sig: take 1 capsule by mouth every morning       Last Appointment Date: 1/17/2023  Next Appointment Date: Visit date not found    No Known Allergies

## 2023-05-23 RX ORDER — HYDROCHLOROTHIAZIDE 12.5 MG/1
12.5 CAPSULE, GELATIN COATED ORAL EVERY MORNING
Qty: 90 CAPSULE | Refills: 1 | Status: SHIPPED | OUTPATIENT
Start: 2023-05-23

## 2023-05-23 RX ORDER — LOSARTAN POTASSIUM 50 MG/1
50 TABLET ORAL DAILY
Qty: 30 TABLET | Refills: 5 | Status: SHIPPED | OUTPATIENT
Start: 2023-05-23

## 2023-05-23 NOTE — TELEPHONE ENCOUNTER
Refill request     Medication pended if agreeable     Last Appt:  1/17/2023  Next Appt:   Visit date not found  Med verified in [address]

## 2023-08-07 ENCOUNTER — OFFICE VISIT (OUTPATIENT)
Dept: INTERNAL MEDICINE | Age: 78
End: 2023-08-07
Payer: MEDICAID

## 2023-08-07 ENCOUNTER — HOSPITAL ENCOUNTER (OUTPATIENT)
Age: 78
Discharge: HOME | End: 2023-08-07
Payer: MEDICAID

## 2023-08-07 VITALS
SYSTOLIC BLOOD PRESSURE: 138 MMHG | BODY MASS INDEX: 26.16 KG/M2 | RESPIRATION RATE: 16 BRPM | HEIGHT: 65 IN | WEIGHT: 157 LBS | HEART RATE: 85 BPM | DIASTOLIC BLOOD PRESSURE: 78 MMHG | OXYGEN SATURATION: 98 %

## 2023-08-07 DIAGNOSIS — N18.30 STAGE 3 CHRONIC KIDNEY DISEASE, UNSPECIFIED WHETHER STAGE 3A OR 3B CKD (HCC): ICD-10-CM

## 2023-08-07 DIAGNOSIS — K21.9 GASTROESOPHAGEAL REFLUX DISEASE WITHOUT ESOPHAGITIS: ICD-10-CM

## 2023-08-07 DIAGNOSIS — F41.9 ANXIETY AND DEPRESSION: ICD-10-CM

## 2023-08-07 DIAGNOSIS — I10 HYPERTENSION, UNSPECIFIED TYPE: ICD-10-CM

## 2023-08-07 DIAGNOSIS — I10 HYPERTENSION, UNSPECIFIED TYPE: Primary | ICD-10-CM

## 2023-08-07 DIAGNOSIS — E03.9 HYPOTHYROIDISM, UNSPECIFIED TYPE: ICD-10-CM

## 2023-08-07 DIAGNOSIS — R73.01 IFG (IMPAIRED FASTING GLUCOSE): ICD-10-CM

## 2023-08-07 DIAGNOSIS — J32.9 SINUSITIS, UNSPECIFIED CHRONICITY, UNSPECIFIED LOCATION: ICD-10-CM

## 2023-08-07 DIAGNOSIS — F32.A ANXIETY AND DEPRESSION: ICD-10-CM

## 2023-08-07 LAB
ALBUMIN SERPL-MCNC: 4.3 G/DL (ref 3.5–5.2)
ALBUMIN/GLOB SERPL: 1.6 {RATIO} (ref 1–2.5)
ALP SERPL-CCNC: 65 U/L (ref 35–104)
ALT SERPL-CCNC: 16 U/L (ref 5–33)
ANION GAP SERPL CALCULATED.3IONS-SCNC: 10 MMOL/L (ref 9–17)
AST SERPL-CCNC: 16 U/L
BASOPHILS # BLD: 0.07 K/UL (ref 0–0.2)
BASOPHILS NFR BLD: 1 % (ref 0–2)
BILIRUB SERPL-MCNC: 0.4 MG/DL (ref 0.3–1.2)
BUN SERPL-MCNC: 18 MG/DL (ref 8–23)
BUN/CREAT SERPL: 16 (ref 9–20)
CALCIUM SERPL-MCNC: 9.4 MG/DL (ref 8.6–10.4)
CHLORIDE SERPL-SCNC: 104 MMOL/L (ref 98–107)
CHOLEST SERPL-MCNC: 160 MG/DL
CHOLESTEROL/HDL RATIO: 3
CO2 SERPL-SCNC: 27 MMOL/L (ref 20–31)
CREAT SERPL-MCNC: 1.1 MG/DL (ref 0.5–0.9)
EOSINOPHIL # BLD: 0.76 K/UL (ref 0–0.44)
EOSINOPHILS RELATIVE PERCENT: 11 % (ref 1–4)
ERYTHROCYTE [DISTWIDTH] IN BLOOD BY AUTOMATED COUNT: 14.5 % (ref 11.8–14.4)
EST. AVERAGE GLUCOSE BLD GHB EST-MCNC: 134 MG/DL
GFR SERPL CREATININE-BSD FRML MDRD: 51 ML/MIN/1.73M2
GLUCOSE SERPL-MCNC: 107 MG/DL (ref 70–99)
HBA1C MFR BLD: 6.3 % (ref 4–6)
HCT VFR BLD AUTO: 36.6 % (ref 36.3–47.1)
HDLC SERPL-MCNC: 53 MG/DL
HGB BLD-MCNC: 11.9 G/DL (ref 11.9–15.1)
IMM GRANULOCYTES # BLD AUTO: <0.03 K/UL (ref 0–0.3)
IMM GRANULOCYTES NFR BLD: 0 %
LDLC SERPL CALC-MCNC: 96 MG/DL (ref 0–130)
LYMPHOCYTES NFR BLD: 2.45 K/UL (ref 1.1–3.7)
LYMPHOCYTES RELATIVE PERCENT: 36 % (ref 24–43)
MCH RBC QN AUTO: 28.5 PG (ref 25.2–33.5)
MCHC RBC AUTO-ENTMCNC: 32.5 G/DL (ref 25.2–33.5)
MCV RBC AUTO: 87.6 FL (ref 82.6–102.9)
MONOCYTES NFR BLD: 0.41 K/UL (ref 0.1–1.2)
MONOCYTES NFR BLD: 6 % (ref 3–12)
NEUTROPHILS NFR BLD: 46 % (ref 36–65)
NEUTS SEG NFR BLD: 3.02 K/UL (ref 1.5–8.1)
NRBC BLD-RTO: 0 PER 100 WBC
PLATELET # BLD AUTO: 225 K/UL (ref 138–453)
PMV BLD AUTO: 10.8 FL (ref 8.1–13.5)
POTASSIUM SERPL-SCNC: 4.3 MMOL/L (ref 3.7–5.3)
PROT SERPL-MCNC: 7 G/DL (ref 6.4–8.3)
RBC # BLD AUTO: 4.18 M/UL (ref 3.95–5.11)
RBC # BLD: ABNORMAL 10*6/UL
SODIUM SERPL-SCNC: 141 MMOL/L (ref 135–144)
TRIGL SERPL-MCNC: 56 MG/DL
TSH SERPL DL<=0.05 MIU/L-ACNC: 2.19 UIU/ML (ref 0.3–5)
WBC OTHER # BLD: 6.7 K/UL (ref 3.5–11.3)

## 2023-08-07 PROCEDURE — 3075F SYST BP GE 130 - 139MM HG: CPT | Performed by: INTERNAL MEDICINE

## 2023-08-07 PROCEDURE — 83036 HEMOGLOBIN GLYCOSYLATED A1C: CPT

## 2023-08-07 PROCEDURE — G8417 CALC BMI ABV UP PARAM F/U: HCPCS | Performed by: INTERNAL MEDICINE

## 2023-08-07 PROCEDURE — 84443 ASSAY THYROID STIM HORMONE: CPT

## 2023-08-07 PROCEDURE — 1036F TOBACCO NON-USER: CPT | Performed by: INTERNAL MEDICINE

## 2023-08-07 PROCEDURE — 36415 COLL VENOUS BLD VENIPUNCTURE: CPT

## 2023-08-07 PROCEDURE — 80053 COMPREHEN METABOLIC PANEL: CPT

## 2023-08-07 PROCEDURE — G8427 DOCREV CUR MEDS BY ELIG CLIN: HCPCS | Performed by: INTERNAL MEDICINE

## 2023-08-07 PROCEDURE — 3078F DIAST BP <80 MM HG: CPT | Performed by: INTERNAL MEDICINE

## 2023-08-07 PROCEDURE — 1123F ACP DISCUSS/DSCN MKR DOCD: CPT | Performed by: INTERNAL MEDICINE

## 2023-08-07 PROCEDURE — 85025 COMPLETE CBC W/AUTO DIFF WBC: CPT

## 2023-08-07 PROCEDURE — 80061 LIPID PANEL: CPT

## 2023-08-07 PROCEDURE — 99212 OFFICE O/P EST SF 10 MIN: CPT | Performed by: INTERNAL MEDICINE

## 2023-08-07 PROCEDURE — 1090F PRES/ABSN URINE INCON ASSESS: CPT | Performed by: INTERNAL MEDICINE

## 2023-08-07 PROCEDURE — G8400 PT W/DXA NO RESULTS DOC: HCPCS | Performed by: INTERNAL MEDICINE

## 2023-08-07 PROCEDURE — 99214 OFFICE O/P EST MOD 30 MIN: CPT | Performed by: INTERNAL MEDICINE

## 2023-08-07 RX ORDER — ESCITALOPRAM OXALATE 20 MG/1
30 TABLET ORAL DAILY
Qty: 90 TABLET | Refills: 3 | Status: SHIPPED | OUTPATIENT
Start: 2023-08-07

## 2023-08-07 RX ORDER — ESOMEPRAZOLE MAGNESIUM 40 MG/1
40 CAPSULE, DELAYED RELEASE ORAL
Qty: 90 CAPSULE | Refills: 3 | Status: SHIPPED | OUTPATIENT
Start: 2023-08-07

## 2023-08-07 RX ORDER — AMOXICILLIN AND CLAVULANATE POTASSIUM 875; 125 MG/1; MG/1
1 TABLET, FILM COATED ORAL 2 TIMES DAILY
Qty: 14 TABLET | Refills: 0 | Status: SHIPPED | OUTPATIENT
Start: 2023-08-07 | End: 2023-08-14

## 2023-08-07 RX ORDER — OMEPRAZOLE 40 MG/1
40 CAPSULE, DELAYED RELEASE ORAL DAILY
Qty: 90 CAPSULE | Refills: 1 | Status: SHIPPED | OUTPATIENT
Start: 2023-08-07

## 2023-08-07 RX ORDER — FLUTICASONE PROPIONATE 50 MCG
1 SPRAY, SUSPENSION (ML) NASAL DAILY
Qty: 3 EACH | Refills: 5 | Status: SHIPPED | OUTPATIENT
Start: 2023-08-07

## 2023-08-07 RX ORDER — MECLIZINE HYDROCHLORIDE 25 MG/1
25 TABLET ORAL 3 TIMES DAILY PRN
Qty: 90 TABLET | Refills: 3 | Status: SHIPPED | OUTPATIENT
Start: 2023-08-07

## 2023-08-07 RX ORDER — ESCITALOPRAM OXALATE 20 MG/1
20 TABLET ORAL DAILY
Qty: 90 TABLET | Refills: 3 | Status: CANCELLED | OUTPATIENT
Start: 2023-08-07

## 2023-08-07 RX ORDER — LOSARTAN POTASSIUM 50 MG/1
50 TABLET ORAL DAILY
Qty: 90 TABLET | Refills: 3 | Status: SHIPPED | OUTPATIENT
Start: 2023-08-07

## 2023-08-07 RX ORDER — LEVOTHYROXINE SODIUM 88 UG/1
88 TABLET ORAL DAILY
Qty: 90 TABLET | Refills: 3 | Status: SHIPPED | OUTPATIENT
Start: 2023-08-07

## 2023-08-07 RX ORDER — HYDROCHLOROTHIAZIDE 12.5 MG/1
12.5 CAPSULE, GELATIN COATED ORAL EVERY MORNING
Qty: 90 CAPSULE | Refills: 3 | Status: SHIPPED | OUTPATIENT
Start: 2023-08-07

## 2023-08-07 RX ORDER — PROPRANOLOL HCL 60 MG
60 CAPSULE, EXTENDED RELEASE 24HR ORAL DAILY
Qty: 90 CAPSULE | Refills: 3 | Status: SHIPPED | OUTPATIENT
Start: 2023-08-07

## 2023-08-07 RX ORDER — TRIAMTERENE AND HYDROCHLOROTHIAZIDE 37.5; 25 MG/1; MG/1
1 TABLET ORAL DAILY
Qty: 90 TABLET | Refills: 3 | Status: SHIPPED | OUTPATIENT
Start: 2023-08-07

## 2023-08-07 SDOH — ECONOMIC STABILITY: HOUSING INSECURITY
IN THE LAST 12 MONTHS, WAS THERE A TIME WHEN YOU DID NOT HAVE A STEADY PLACE TO SLEEP OR SLEPT IN A SHELTER (INCLUDING NOW)?: NO

## 2023-08-07 SDOH — ECONOMIC STABILITY: FOOD INSECURITY: WITHIN THE PAST 12 MONTHS, THE FOOD YOU BOUGHT JUST DIDN'T LAST AND YOU DIDN'T HAVE MONEY TO GET MORE.: NEVER TRUE

## 2023-08-07 SDOH — ECONOMIC STABILITY: INCOME INSECURITY: HOW HARD IS IT FOR YOU TO PAY FOR THE VERY BASICS LIKE FOOD, HOUSING, MEDICAL CARE, AND HEATING?: NOT VERY HARD

## 2023-08-07 SDOH — ECONOMIC STABILITY: FOOD INSECURITY: WITHIN THE PAST 12 MONTHS, YOU WORRIED THAT YOUR FOOD WOULD RUN OUT BEFORE YOU GOT MONEY TO BUY MORE.: NEVER TRUE

## 2023-08-14 ENCOUNTER — TELEPHONE (OUTPATIENT)
Dept: INTERNAL MEDICINE | Age: 78
End: 2023-08-14

## 2023-08-14 RX ORDER — ONDANSETRON 4 MG/1
4 TABLET, FILM COATED ORAL EVERY 8 HOURS PRN
Qty: 30 TABLET | Refills: 1 | Status: SHIPPED | OUTPATIENT
Start: 2023-08-14

## 2023-08-14 NOTE — TELEPHONE ENCOUNTER
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         Changes for today: 1.   Admitted to the ICU for continued management, PICC line placed prior to transfer.  2. Norepinephrine gtt for BP management.  3. Trend cardiac markers, echo in the a.m., heparin infusion.  4. Check urinalysis.  Continue Zosyn IV.  Follow-up CRP, procalcitonin.  5. Insulin infusion for diabetes management.  Check beta hydroxybutyrate.  6. NS infusion at 75 mL/h, electrolyte checks every 4h.        Assessment/Plan:   Neurologic: No issues.      Pain control: As needed     Pulmonary: No issues.  Remains on room air.  VBG consistent with metabolic acidosis.    Wean supplemental O2 as tolerated; goal O2 sat > 92%.  HOB > 30 degrees to limit aspiration risk.      Pulmonary hygiene: Incentive spirometry, OOB to chair, PT/OT when able    Follow-up chest x-ray with sepsis screening and also assessing PICC line placement     Cardiovascular: Distributive/septic shock requiring vasopressors, chronic systolic CHF without exacerbation, diffuse coronary artery disease with history of non-STEMI 08/2022, elevated  Phone rings busy and voice mail box not set up troponin, paroxysmal atrial fibrillation with RVR, dyslipidemia.  Patient presented to the ED in atrial fibrillation with RVR, HR 160s to 180s, treated with metoprolol IV and responded well.  However, has become progressively hypotensive, suspect distributive shock and hypovolemia despite elevated BNP and troponin.  Denies any chest pain, question if elevated troponin demand ischemia versus non-STEMI.  Has extensive coronary artery history that is not amenable to surgical intervention.  Last cardiac angiogram 08/2022.  On metoprolol, lisinopril, furosemide at baseline.  Also on Brilinta since 08/2022, uncertain timeline in which to stop therapy (? 6 months per discharge summary at that time).    Cardiac monitoring.  SBP >= 90 mmHg, MAP >= 65 mmHg.  EKG PRN.    Norepinephrine gtt as needed for BP goals.    Hold home PTA antihypertensives.    Continue aspirin, Brilinta.    Hold statin for now, monitor LFTs.  Holding diuresis with JOHANA. Trend lactic acid.    Trend cardiac markers, check echo and EKG in the AM.  Initiate heparin infusion.    Pending outcome of above, we will follow-up with cardiology team.     GI/: Abnormal LFTs, transaminitis.  Mild elevation in AST    Monitor LFTs    Nutrition: Liquid diet for now while correcting hyperglycemia.     Last BM: None recorded.  Meds: None.    GI prophylaxis: Not indicated.     Renal: Acute kidney injury on CKD stage III, metabolic/lactic acidosis, electrolytes derangements (hyponatremia, hypochloremia).  Insetting of dehydration, hemodynamic lability.  Has improved with volume resuscitation.  Received NS 2000 mL in ED.  Lactic acid 9.0 => 4.1.    Monitor I/O's.  Electrolyte repletion PRN.  Avoid/limit nephrotoxic agents.    IVFs: NS at 75 mL/hr.  Monitor for volume overload.    Electrolyte panel every 4 hour.  Lactic acid every 6 hour.  Check beta hydroxybutyrate    Initial sodium 119, but when corrected for serum glucose is approximately 127.  We will need to monitor  trends.  Ideally, will not initiate rapid correction.     Heme/Onc: Leukocytosis, unspecified.  Normocytic anemia.      DVT prophylaxis: SCDs, heparin infusion.     Endocrine: Insulin-dependent diabetes mellitus type 2 with hyperglycemia.  Hemoglobin A1c 6.9% on 7/17/2023.  At baseline is on glargine 38 units nightly, aspart sliding scale.  Patient with acidosis and hyperglycemia on presentation, may have mild diabetic ketoacidosis.    Regular insulin infusion per protocol.  Hypoglycemia protocol.    I have opted not to use DKA protocol given that patient would have mild DKA if present, and also IV fluid management will need to be particularly managed due to sodium status.     Infectious diseases: Suspected sepsis, source unclear.  For presentation appears consistent with sepsis in the setting of JOHANA, dehydration/distributive shock.  Patient received vancomycin and Zosyn IV in the ED.    Follow-up blood cultures.  Check urinalysis.    Follow-up x-ray.    Continue empiric antibiotics with Zosyn IV (start 7/17).    Rheum/Musculoskeletal: History of left lower extremity osteomyelitis status post left BKA 04/2022.    Activity: Bedrest.    Risk Factors Present on Admission:  Clinically Significant Risk Factors Present on Admission        # Hyperkalemia: Highest K = 5.4 mmol/L in last 2 days, will monitor as appropriate  # Hyponatremia: Lowest Na = 119 mmol/L in last 2 days, will monitor as appropriate     # Anion Gap Metabolic Acidosis: Highest Anion Gap = 25 mmol/L in last 2 days, will monitor and treat as appropriate  # Hypoalbuminemia: Lowest albumin = 3.1 g/dL at 7/17/2023  1:00 PM, will monitor as appropriate   # Drug Induced Platelet Defect: home medication list includes an antiplatelet medication   # Hypertension: Noted on problem list  # Chronic heart failure with reduced ejection fraction: last echo with EF <40%  # Circulatory Shock: currently requiring pressors for blood pressure support    # DMII: A1C = 6.9 %  "(Ref range: <5.7 %) within past 6 months   # Overweight: Estimated body mass index is 25.68 kg/m  as calculated from the following:    Height as of this encounter: 1.88 m (6' 2\").    Weight as of this encounter: 90.7 kg (200 lb).              Lines/Drains/Tubes:  LUE PICC central line, placed 7/17     Code Status:  Full Code. Reviewed with the patient.      The patient and/or the family was educated about the above plan of care and indicated understanding. Called patient's sister Henna, updates provided.       This patient is considered critically ill and requires ICU level of care due to circulatory shock requiring vasopressors.     Total Critical Care time, not including separate billable procedure time: 50 minutes.    Shilpa Hale MD, MPH  Pulmonary/Critical Care Medicine  07/17/2023   6:24 PM         ICU DAILY CHECKLIST:   ICU DAILY CHECKLIST           Can patient transfer out of MICU? no    FAST HUG:    Feeding:  yes.  Patient is receiving ORAL    Garcia: no  Analgesia/Sedation: no; PRNs   Thromboembolic prophylaxis: yes; Mode:  Heparin and SCDs  HOB>30:  yes  Stress Ulcer Protocol Active: no; Mode: Not Indicated  Glycemic Control: Any glucose > 180 yes; Mode of Insulin Therapy: Insulin gtt    INTUBATED:  Can patient have daily waking:  n/a  Can patient have spontaneous breathing trial:  N/a    Restraints? no    PHYSICAL THERAPY AND MOBILITY:  Can patient have PT and mobility trial: no  Activity: Bedrest     Chief Complaint Chief Complaint   Patient presents with     Arm Pain     Fatigue             HPI:   Norman Aguilar is a 63 year old male with essential hypertension, coronary artery disease/NSTEMI on Brillinta since 08/2022, chronic systolic CHF, essential hypertension, CKD stage III, history of left foot osteomyelitis status post left BKA 4/4/2022, insulin-dependent diabetes mellitus type 2 (HgbA1c 6.9% 7/17/23) who presented to [facility] ED with multiple symptoms including weakness, " dehydration, scalp pain/burning, and chronic R arm pain, now admitted to the ICU for circulatory/distributive shock with suspected sepsis (source unknown), JOHANA, and uncontrolled diabetes.  History is provided by the patient, ED provider, review of records..    Patient is independent and lives in an RV vehicle.  He presented to ED with multiple complaints including feelings of being dehydrated with weakness.  Also with some discomfort to his left scalp, as well as having a burning sensation.  He also endorses right-sided arm pain that has been going on for a while.  He denied any chest pain/pressure, shortness of breath, lower extremity edema, rashes/skin lesions.  He did endorse being hit in the eye by a 2 x 10 wood plank when helping a friend, and says his left eye has been slightly swollen since that time.  In the ED, patient afebrile, O2 sat 99% on room air, found to have atrial fibrillation with RVR, rate in the 160s to 180s.  Labs with sodium 119, potassium 4.6, CO2 14, creatinine 3.25, AST 92, lactic acid 9.0, troponin to 16, NT proBNP 8551.  Glucose 494.  VBG pH 7.27, PCO2 36.  WBC 14.5, hemoglobin 13.2.  Received 2 L NS bolus, Zosyn IV in the ED.  Also received metoprolol 5 mg IV for atrial fibrillation with RVR.  Clinically improved and had also improvement to perfusion of his right foot.    Patient reports he feels slightly better than at the time of presentation.        Review of Systems:   Review of Systems:  The Review of Systems is negative other than noted in the HPI        Medical/Surgical History:     Past Medical History:   Diagnosis Date     Anemia      Chronic systolic CHF (congestive heart failure) (H)      Coronary artery disease      Diabetic neuropathy (H)      DM type 2 w Neuropathy      GERD (gastroesophageal reflux disease)      Hyperlipidemia      Hypertension      Intermittent atrial fibrillation (H)     on Eliquis     Ischemic cardiomyopathy 11/16/2021    Echo with EF of 30-35%      NSTEMI (non-ST elevated myocardial infarction) (H) 11/15/2021     Osteomyelitis of left foot (H) 04/04/2022     Renal disease      Retinopathy      Sleep disturbance       Past Surgical History:   Procedure Laterality Date     AMPUTATE FOOT Left 3/21/2022    Procedure: Guillotine AMPUTATION, FOOT;  Surgeon: Ronald Bhatt MD;  Location: [facility] OR     AMPUTATE LEG BELOW KNEE Left 4/4/2022    Procedure: LEFT BELOW  KNEE AMPUTATION;  Surgeon: Ronald Bhatt MD;  Location: [facility] OR     AMPUTATE TOE(S) Left 11/16/2021    Procedure: first and second ray amputation;  Surgeon: Eddi Ram DPM;  Location: [facility] OR     APPENDECTOMY       CV CORONARY ANGIOGRAM N/A 11/16/2021    Procedure: CV CORONARY ANGIOGRAM;  Surgeon: Pam Tabares MD;  Location: [facility] LAB CV     CV CORONARY ANGIOGRAM N/A 8/24/2022    Procedure: CV CORONARY ANGIOGRAM;  Surgeon: Cipriano Lucas MD;  Location: [facility] LAB CV     CV CORONARY ANGIOGRAM N/A 8/29/2022    Procedure: CV CORONARY ANGIOGRAM;  Surgeon: Cipriano Lucas MD;  Location: [facility] CV     CV INTRAVASULAR ULTRASOUND N/A 8/29/2022    Procedure: Intravascular Ultrasound;  Surgeon: Cipriano Lucas MD;  Location: [facility] CV     CV LEFT HEART CATH N/A 11/16/2021    Procedure: Left Heart Cath;  Surgeon: Pam Tabares MD;  Location: [facility] CV     CV PCI N/A 8/29/2022    Procedure: Percutaneous Coronary Intervention stent;  Surgeon: Cipriano Lucas MD;  Location: [facility] CV     CV PCI ANGIOPLASTY N/A 8/29/2022    Procedure: Percutaneous Transluminal Angioplasty;  Surgeon: Cipriano Lucas MD;  Location: [facility] CV     FOOT SURGERY Left      HERNIA REPAIR       INCISION AND DRAINAGE LOWER EXTREMITY, COMBINED Left 11/16/2021    Procedure: INCISION AND DRAINAGE, left foot;  Surgeon: Eddi Ram DPM;  Location: [facility] OR     INCISION AND DRAINAGE LOWER EXTREMITY,  COMBINED Left 11/19/2021    Procedure: INCISION AND DRAINAGE, left foot;  Surgeon: Eddi Ram DPM;  Location: [facility] OR     INCISION AND DRAINAGE LOWER EXTREMITY, COMBINED Left 12/9/2021    Procedure: INCISION AND DRAINAGE, Left foot;  Surgeon: Eddi Ram DPM;  Location: [facility] OR     INCISION AND DRAINAGE LOWER EXTREMITY, COMBINED Left 1/10/2022    Procedure: INCISION AND DRAINAGE, left foot;  Surgeon: Eddi Ram DPM;  Location: [facility] OR     INCISION AND DRAINAGE LOWER EXTREMITY, COMBINED Left 1/27/2022    Procedure: INCISION AND DRAINAGE, Left foot;  Surgeon: Eddi Ram DPM;  Location: [facility] OR     PICC TRIPLE LUMEN PLACEMENT  7/17/2023                 Allergies/Medications:   Allergies:   No Known Allergies    OUTPATIENT Medications:  Medications Prior to Admission   Medication Sig Dispense Refill Last Dose     aspirin (ASA) 81 MG EC tablet Take 1 tablet (81 mg) by mouth daily Start tomorrow. 30 tablet 3 7/16/2023 at pm     atorvastatin (LIPITOR) 80 MG tablet TAKE 1 TABLET(80 MG) BY MOUTH EVERY EVENING 90 tablet 1 7/16/2023 at pm     furosemide (LASIX) 20 MG tablet Take 20 mg by mouth daily   7/17/2023 at am     insulin aspart (NOVOLOG FLEXPEN) 100 UNIT/ML pen For  - 164 give 1 unit. For  - 189 give 2 units. For  - 214 give 3 units. For  - 239 give 4 units. For  - 264 give 5 units. For  - 289 give 6 units. For  - 314 give 7 units. For  - 339 give 8 units For  - 364 give 9 units For  - 389 give 10 units For  - 414 give 11 units For BG greater than or equal to 415 give 12 units 3 mL 3 7/16/2023     insulin glargine (LANTUS SOLOSTAR) 100 UNIT/ML pen Inject 38 Units Subcutaneous every morning (Patient taking differently: Inject 38 Units Subcutaneous At Bedtime) 3 mL 3 7/16/2023 at pm     lisinopril (ZESTRIL) 5 MG tablet Take 1 tablet by mouth daily   7/17/2023 at am      magnesium oxide (MAG-OX) 400 MG tablet Take 1 tablet (400 mg) by mouth daily 90 tablet 1 7/17/2023 at am     metoprolol succinate ER (TOPROL-XL) 50 MG 24 hr tablet TAKE 1 TABLET(50 MG) BY MOUTH DAILY 90 tablet 1 7/17/2023 at am     Multiple Vitamin (MULTI VITAMIN) TABS Take 1 tablet by mouth daily    7/17/2023 at am     OMEGA-3/DHA/EPA/FISH OIL (FISH OIL-OMEGA-3 FATTY ACIDS) 300-1,000 mg capsule Take 1 g by mouth daily    7/17/2023 at am     ticagrelor (BRILINTA) 90 MG tablet Take 1 tablet (90 mg) by mouth 2 times daily Dose to start tomorrow morning. 180 tablet 3 7/17/2023 at am     vitamin C (ASCORBIC ACID) 500 MG tablet Take 500 mg by mouth daily   7/17/2023 at am     blood glucose (NO BRAND SPECIFIED) test strip Use to test blood sugar 4 times daily or as directed. 100 strip 3      blood glucose monitoring (SOFTCLIX) lancets Use to test blood sugar 4 times daily. 100 each 6         INPATIENT Medications: Continuous Infusions:    norepinephrine 0.02 mcg/kg/min (07/17/23 1815)     INPATIENT Medications: Scheduled Medications:    sodium chloride (PF)  10-40 mL Intracatheter Q7 Days     sodium chloride (PF)  3 mL Intracatheter Q8H         Family History:     Social History:   Reviewed:  No family history on file. Reviewed:    Tobacco: occasional cigar use, doesn't use cigarettes    Alcohol: rare use, maybe 2-3 times a year    Drugs: Denies    Other: Living in a car/RV, independent  Social History     Socioeconomic History     Marital status: Single     Spouse name: Not on file     Number of children: Not on file     Years of education: Not on file     Highest education level: Not on file   Occupational History     Not on file   Tobacco Use     Smoking status: Former     Types: Cigars     Smokeless tobacco: Never     Tobacco comments:     Cigars   Vaping Use     Vaping Use: Never used   Substance and Sexual Activity     Alcohol use: Yes     Comment: < 1 drink a month (only holidays)     Drug use: Never     Sexual  "activity: Not on file   Other Topics Concern     Parent/sibling w/ CABG, MI or angioplasty before 65F 55M? Not Asked   Social History Narrative    Single, currently not working, has done numerous jobs, lives alone.  Full code. (last updated 4/4/2022)      Social Determinants of Health     Financial Resource Strain: Not on file   Food Insecurity: Not on file   Transportation Needs: Not on file   Physical Activity: Not on file   Stress: Not on file   Social Connections: Not on file   Intimate Partner Violence: Not on file   Housing Stability: Not on file              Objective:   Vitals:  BP 99/53   Pulse 115   Temp 98.5  F (36.9  C) (Oral)   Resp 24   Ht 1.88 m (6' 2\")   Wt 90.7 kg (200 lb)   SpO2 99%   BMI 25.68 kg/m    Vent: FiO2 (%): 21 %  Resp: 24    GEN: Pleasant, very talkative, in mild discomfort  HEENT: Normocephalic, atraumatic.  Extraoccular eye movements intact, anicteric sclera. Moist mucous membranes. Face slightly flushed. L eye with mild puffiness.   NECK: Supple.    PULM: Non-labored breathing.  No use of accessory muscles.  Clear to ausculation bilaterally.   CVS: Regular rate and rhythm.  Normal S1, S2.  No rubs, murmurs, or gallops.    ABDOMEN: Normoactive bowel sounds.  Non-tender to palpation.  Non-distended.    EXTREMITES:  No clubbing, cyanosis, or edema.  S/p left BKA.   NEURO:  Awake.  Oriented to person, place, time and situation.  Cranial nerves 2-12 grossly intact.  Moving all extremities.      Intake/Output: I/O last 3 completed shifts:  In: 8 [I.V.:8]  Out: -         Pertinent Studies:   All laboratory data reviewed  Serum Glucose range:   Recent Labs   Lab 07/17/23  1425 07/17/23  1300   * 494*     ABG: No lab results found in last 7 days.  CBC:   Recent Labs   Lab 07/17/23  1302   WBC 14.5*   HGB 13.2*   HCT 38.5*   MCV 83      NEUTROPHIL 87   LYMPH 3   MONOCYTE 7   EOSINOPHIL 0     Chemistry:   Recent Labs   Lab 07/17/23  1425 07/17/23  1300   * 119* "   POTASSIUM 5.4* 4.6   CHLORIDE 86* 80*   CO2 15* 14*   BUN 63.1* 64.9*   CR 2.94* 3.25*   GFRESTIMATED 23* 20*   YOSVANY 8.4* 9.2   MAG 1.9  --    PROTTOTAL  --  7.2   ALBUMIN  --  3.1*   AST  --  92*   ALT  --  33   ALKPHOS  --  103   BILITOTAL  --  1.7*     Coags:  No results for input(s): INR, PROTIME, PTT in the last 168 hours.    Invalid input(s): APTT  Cardiac Markers:  No results for input(s): CKTOTAL, TROPONINI in the last 168 hours.     Microbiology:  Blood cultures x2, 7/17: NGTD        Cardiology/Radiology:   Cardiac: All cardiac studies reviewed by me.    EKG: Reviewed    TTE, 8/25/22:  Summary:  The left ventricle is normal in size with normal left ventricular wall Thickness. Left ventricular function is decreased. The ejection fraction is 30-35% (moderately reduced). Mildly decreased right ventricu lar systolic function Moderate biatrial enlargement No hemodynamically significant valve disease IVC diameter >2.1 cm collapsing <50% with sniff suggests a high RA pressure estimated at 15 mmHg or greater. Compared to prior study, there is no significant change.    Radiology:  All imaging studies reviewed by me.    None:

## 2023-08-14 NOTE — TELEPHONE ENCOUNTER
Please call him and let him know that I sent Zofran 3 times a day as needed to [facility].  She does not have to use it all the time, can use it as needed.   I would prefer that she is not on it forever, but lets see how that goes for the next couple of days

## 2023-08-14 NOTE — TELEPHONE ENCOUNTER
Last appt: 8/7/2023  Next appt: Visit date not found    Patient son Nelson Gomez called in saying that his mom has had an upset stomach and feeling nauseous along with diarrhea for the past two days. The son was wondering if there was anything  could call into [facility] for her. Let the son know what Ngozi Fountain decides.

## 2023-08-31 ENCOUNTER — TELEPHONE (OUTPATIENT)
Dept: INTERNAL MEDICINE | Age: 78
End: 2023-08-31

## 2023-08-31 RX ORDER — FLUCONAZOLE 150 MG/1
150 TABLET ORAL ONCE
Qty: 1 TABLET | Refills: 0 | Status: SHIPPED | OUTPATIENT
Start: 2023-08-31 | End: 2023-08-31

## 2023-08-31 NOTE — TELEPHONE ENCOUNTER
Patient son called in requesting something for a yeast infection patient complains that yeast infection has been keeping her up all night because of itching.

## 2023-09-05 ENCOUNTER — OFFICE VISIT (OUTPATIENT)
Dept: INTERNAL MEDICINE | Age: 78
End: 2023-09-05
Payer: MEDICAID

## 2023-09-05 VITALS
SYSTOLIC BLOOD PRESSURE: 130 MMHG | HEIGHT: 65 IN | RESPIRATION RATE: 16 BRPM | HEART RATE: 68 BPM | OXYGEN SATURATION: 95 % | BODY MASS INDEX: 26.33 KG/M2 | DIASTOLIC BLOOD PRESSURE: 72 MMHG | WEIGHT: 158 LBS

## 2023-09-05 DIAGNOSIS — J30.9 ALLERGIC RHINITIS, UNSPECIFIED SEASONALITY, UNSPECIFIED TRIGGER: Primary | ICD-10-CM

## 2023-09-05 DIAGNOSIS — K21.9 GASTROESOPHAGEAL REFLUX DISEASE WITHOUT ESOPHAGITIS: ICD-10-CM

## 2023-09-05 DIAGNOSIS — K59.00 CONSTIPATION, UNSPECIFIED CONSTIPATION TYPE: ICD-10-CM

## 2023-09-05 DIAGNOSIS — I10 HYPERTENSION, UNSPECIFIED TYPE: ICD-10-CM

## 2023-09-05 PROCEDURE — 99212 OFFICE O/P EST SF 10 MIN: CPT | Performed by: INTERNAL MEDICINE

## 2023-09-05 PROCEDURE — 3075F SYST BP GE 130 - 139MM HG: CPT | Performed by: INTERNAL MEDICINE

## 2023-09-05 PROCEDURE — G8400 PT W/DXA NO RESULTS DOC: HCPCS | Performed by: INTERNAL MEDICINE

## 2023-09-05 PROCEDURE — 1123F ACP DISCUSS/DSCN MKR DOCD: CPT | Performed by: INTERNAL MEDICINE

## 2023-09-05 PROCEDURE — G8417 CALC BMI ABV UP PARAM F/U: HCPCS | Performed by: INTERNAL MEDICINE

## 2023-09-05 PROCEDURE — 1036F TOBACCO NON-USER: CPT | Performed by: INTERNAL MEDICINE

## 2023-09-05 PROCEDURE — 3078F DIAST BP <80 MM HG: CPT | Performed by: INTERNAL MEDICINE

## 2023-09-05 PROCEDURE — 1090F PRES/ABSN URINE INCON ASSESS: CPT | Performed by: INTERNAL MEDICINE

## 2023-09-05 PROCEDURE — G8427 DOCREV CUR MEDS BY ELIG CLIN: HCPCS | Performed by: INTERNAL MEDICINE

## 2023-09-05 PROCEDURE — 99214 OFFICE O/P EST MOD 30 MIN: CPT | Performed by: INTERNAL MEDICINE

## 2023-09-05 RX ORDER — FLUTICASONE PROPIONATE 50 MCG
1 SPRAY, SUSPENSION (ML) NASAL DAILY
Qty: 3 EACH | Refills: 5 | Status: SHIPPED | OUTPATIENT
Start: 2023-09-05

## 2023-09-05 RX ORDER — PANTOPRAZOLE SODIUM 40 MG/1
40 TABLET, DELAYED RELEASE ORAL
Qty: 30 TABLET | Refills: 5 | Status: SHIPPED | OUTPATIENT
Start: 2023-09-05

## 2023-09-05 RX ORDER — LORATADINE 10 MG/1
10 TABLET ORAL NIGHTLY
Qty: 90 TABLET | Refills: 2 | Status: SHIPPED | OUTPATIENT
Start: 2023-09-05

## 2023-09-05 NOTE — PROGRESS NOTES
Disp: 1 kit, Rfl: 0    hydroCHLOROthiazide (MICROZIDE) 12.5 MG capsule, Take 1 capsule by mouth every morning, Disp: 90 capsule, Rfl: 3    losartan (COZAAR) 50 MG tablet, Take 1 tablet by mouth daily, Disp: 90 tablet, Rfl: 3    levothyroxine (EUTHYROX) 88 MCG tablet, Take 1 tablet by mouth Daily, Disp: 90 tablet, Rfl: 3    metFORMIN (GLUCOPHAGE) 500 MG tablet, Take 1 tablet by mouth 2 times daily (with meals), Disp: 180 tablet, Rfl: 3    triamterene-hydroCHLOROthiazide (MAXZIDE-25) 37.5-25 MG per tablet, Take 1 tablet by mouth daily, Disp: 90 tablet, Rfl: 3    propranolol (INDERAL LA) 60 MG extended release capsule, Take 1 capsule by mouth daily, Disp: 90 capsule, Rfl: 3    meclizine (ANTIVERT) 25 MG tablet, Take 1 tablet by mouth 3 times daily as needed for Dizziness, Disp: 90 tablet, Rfl: 3    escitalopram (LEXAPRO) 20 MG tablet, Take 1.5 tablets by mouth daily, Disp: 90 tablet, Rfl: 3    aspirin 325 MG tablet, Take 1 tablet by mouth daily, Disp: , Rfl:     clindamycin (CLEOCIN) 2 % vaginal cream, Place vaginally nightly for 14 nights, Disp: 2 Tube, Rfl: 0    primidone (MYSOLINE) 50 MG tablet, take 1/2 tablet by mouth at bedtime for TREMORS MAY INCREASE TO 1 tablet at bedtime if needed, Disp: 30 tablet, Rfl: 1    triamcinolone (KENALOG) 0.1 % cream, Apply topically 2 times daily. , Disp: 30 g, Rfl: 0    ONETOUCH VERIO strip, use 1 TEST STRIP to TEST BLOOD SUGAR twice a day, Disp: 100 strip, Rfl: 3    dimenhyDRINATE (DRAMAMINE) 50 MG tablet, Take 1 tablet by mouth nightly as needed, Disp: , Rfl:     Allergies  Patient has no known allergies. Past Medical History:   Diagnosis Date    Chronic kidney disease     family denies    Diabetes mellitus ([address])     borderline diabetic    HTN (hypertension)      Past Surgical History:   Procedure Laterality Date    EXTERNAL EAR SURGERY      20 years ago     Family History  This patient's family history is not on file. Social History  Ramírez Poisson  reports that she has never smoked.

## 2023-09-12 ENCOUNTER — HOSPITAL ENCOUNTER (OUTPATIENT)
Dept: GENERAL RADIOLOGY | Age: 78
Discharge: HOME | End: 2023-09-14
Payer: MEDICAID

## 2023-09-12 ENCOUNTER — HOSPITAL ENCOUNTER (OUTPATIENT)
Age: 78
Discharge: HOME | End: 2023-09-12
Payer: MEDICAID

## 2023-09-12 ENCOUNTER — INITIAL CONSULT (OUTPATIENT)
Dept: SURGERY | Age: 78
End: 2023-09-12
Payer: MEDICAID

## 2023-09-12 VITALS
TEMPERATURE: 98.3 F | HEIGHT: 65 IN | OXYGEN SATURATION: 95 % | DIASTOLIC BLOOD PRESSURE: 78 MMHG | HEART RATE: 75 BPM | BODY MASS INDEX: 26.82 KG/M2 | WEIGHT: 161 LBS | SYSTOLIC BLOOD PRESSURE: 128 MMHG

## 2023-09-12 DIAGNOSIS — K21.9 GASTROESOPHAGEAL REFLUX DISEASE, UNSPECIFIED WHETHER ESOPHAGITIS PRESENT: ICD-10-CM

## 2023-09-12 DIAGNOSIS — R10.31 RIGHT LOWER QUADRANT ABDOMINAL PAIN: ICD-10-CM

## 2023-09-12 DIAGNOSIS — R10.31 RIGHT LOWER QUADRANT ABDOMINAL PAIN: Primary | ICD-10-CM

## 2023-09-12 DIAGNOSIS — R30.0 DYSURIA: ICD-10-CM

## 2023-09-12 DIAGNOSIS — R05.8 PRODUCTIVE COUGH: ICD-10-CM

## 2023-09-12 LAB
BILIRUB UR QL STRIP: NEGATIVE
CLARITY UR: CLEAR
COLOR UR: YELLOW
COMMENT: NORMAL
GLUCOSE UR STRIP-MCNC: NEGATIVE MG/DL
HGB UR QL STRIP.AUTO: NEGATIVE
KETONES UR STRIP-MCNC: NEGATIVE MG/DL
LEUKOCYTE ESTERASE UR QL STRIP: NEGATIVE
NITRITE UR QL STRIP: NEGATIVE
PH UR STRIP: 6 [PH] (ref 5–6)
PROT UR STRIP-MCNC: NEGATIVE MG/DL
SP GR UR STRIP: 1.02 (ref 1.01–1.02)
UROBILINOGEN UR STRIP-ACNC: NORMAL EU/DL (ref 0–1)

## 2023-09-12 PROCEDURE — G8427 DOCREV CUR MEDS BY ELIG CLIN: HCPCS | Performed by: SURGERY

## 2023-09-12 PROCEDURE — 3074F SYST BP LT 130 MM HG: CPT | Performed by: SURGERY

## 2023-09-12 PROCEDURE — 74019 RADEX ABDOMEN 2 VIEWS: CPT

## 2023-09-12 PROCEDURE — G8400 PT W/DXA NO RESULTS DOC: HCPCS | Performed by: SURGERY

## 2023-09-12 PROCEDURE — G8417 CALC BMI ABV UP PARAM F/U: HCPCS | Performed by: SURGERY

## 2023-09-12 PROCEDURE — 3078F DIAST BP <80 MM HG: CPT | Performed by: SURGERY

## 2023-09-12 PROCEDURE — 99204 OFFICE O/P NEW MOD 45 MIN: CPT | Performed by: SURGERY

## 2023-09-12 PROCEDURE — 1036F TOBACCO NON-USER: CPT | Performed by: SURGERY

## 2023-09-12 PROCEDURE — 1090F PRES/ABSN URINE INCON ASSESS: CPT | Performed by: SURGERY

## 2023-09-12 PROCEDURE — 71046 X-RAY EXAM CHEST 2 VIEWS: CPT

## 2023-09-12 PROCEDURE — 81003 URINALYSIS AUTO W/O SCOPE: CPT

## 2023-09-12 PROCEDURE — 1123F ACP DISCUSS/DSCN MKR DOCD: CPT | Performed by: SURGERY

## 2023-09-12 PROCEDURE — 99215 OFFICE O/P EST HI 40 MIN: CPT | Performed by: SURGERY

## 2023-09-12 RX ORDER — LANCETS 33 GAUGE
EACH MISCELLANEOUS
COMMUNITY
Start: 2023-08-07

## 2023-09-12 RX ORDER — FLUCONAZOLE 150 MG/1
TABLET ORAL
COMMUNITY
Start: 2023-08-31

## 2023-09-12 RX ORDER — BLOOD-GLUCOSE METER
EACH MISCELLANEOUS
COMMUNITY
Start: 2023-08-07

## 2023-09-12 RX ORDER — OMEPRAZOLE 20 MG/1
20 CAPSULE, DELAYED RELEASE ORAL DAILY
COMMUNITY
Start: 2023-08-14

## 2023-09-12 ASSESSMENT — ENCOUNTER SYMPTOMS
ABDOMINAL DISTENTION: 1
ABDOMINAL PAIN: 1
SORE THROAT: 0
COUGH: 1
CONSTIPATION: 1
SHORTNESS OF BREATH: 1
BLOOD IN STOOL: 1
NAUSEA: 1
VOICE CHANGE: 0
BACK PAIN: 1

## 2023-09-12 NOTE — PROGRESS NOTES
Patient referred for evaluation of GERD and constipation. Complains of abdominal pain and distension. Has been going on a long while. Reports multiple scopes in the past.  She has difficulty having a bowel movement, and pain through out her abdomen particularly on the right. Pain radiates to her back. Her previous scopes were done in [facility]. Uses pain medication and MiraLax. Tried MiraLax for 10 days twice a day for about 10 days. She states she does not have a bowel, last one was maybe a week ago. Her bowel movements have been less than weekly,   Occasionally notices blood. No weight loss. She may be having a fever and chills sometimes. Also has headaches. She may have had colon polyps previously. Also is having GERD. Coughs a lot at night, productive with mucous. Has heart burn every night, with the coughing. Will be bad enough that she vomiting. Interview was carried out with a professional , along with assistance by her son. The  spoke a slightly different dialect and the patient is hard of hearing.     Past Medical History:   Diagnosis Date    Chronic kidney disease     family denies    Diabetes mellitus ([address])     borderline diabetic    HTN (hypertension)      Past Surgical History:   Procedure Laterality Date    EXTERNAL EAR SURGERY      20 years ago     Current Outpatient Medications   Medication Sig Dispense Refill    Blood Glucose Monitoring Suppl ([address] [place] [address] Rd [address]) w/Device KIT USE AS DIRECTED twice a day      omeprazole (PRILOSEC) 20 MG delayed release capsule Take 1 capsule by mouth daily      Lancets (ONETOUCH DELICA PLUS [id number]) MISC USE AS DIRECTED twice a day      fluconazole (DIFLUCAN) 150 MG tablet take 1 tablet by mouth AS A ONE TIME DOSE      fluticasone (FLONASE) 50 MCG/ACT nasal spray 1 spray by Each Nostril route daily 3 each 5    loratadine (CLARITIN) 10 MG tablet Take 1 tablet by mouth nightly 90 tablet 2

## 2023-09-15 DIAGNOSIS — R30.0 DYSURIA: ICD-10-CM

## 2023-09-15 DIAGNOSIS — R10.31 RIGHT LOWER QUADRANT ABDOMINAL PAIN: Primary | ICD-10-CM

## 2023-09-18 ENCOUNTER — HOSPITAL ENCOUNTER (OUTPATIENT)
Dept: CT IMAGING | Age: 78
Discharge: HOME | End: 2023-09-20
Attending: SURGERY
Payer: MEDICAID

## 2023-09-18 DIAGNOSIS — R30.0 DYSURIA: ICD-10-CM

## 2023-09-18 DIAGNOSIS — R10.31 RIGHT LOWER QUADRANT ABDOMINAL PAIN: ICD-10-CM

## 2023-09-18 PROCEDURE — 74176 CT ABD & PELVIS W/O CONTRAST: CPT

## 2023-09-18 RX ORDER — POLYETHYLENE GLYCOL 3350, SODIUM CHLORIDE, SODIUM BICARBONATE, POTASSIUM CHLORIDE 420; 11.2; 5.72; 1.48 G/4L; G/4L; G/4L; G/4L
POWDER, FOR SOLUTION ORAL
Qty: 4000 ML | Refills: 0 | Status: ON HOLD | OUTPATIENT
Start: 2023-09-18 | End: 2023-09-20 | Stop reason: HOSPADM

## 2023-09-18 RX ORDER — BISACODYL 5 MG/1
TABLET, DELAYED RELEASE ORAL
Qty: 6 TABLET | Refills: 0 | Status: ON HOLD | OUTPATIENT
Start: 2023-09-18 | End: 2023-09-20 | Stop reason: HOSPADM

## 2023-09-19 ASSESSMENT — ENCOUNTER SYMPTOMS
ABDOMINAL DISTENTION: 1
BLOOD IN STOOL: 1
SORE THROAT: 0
VOICE CHANGE: 0
ABDOMINAL PAIN: 1
BACK PAIN: 1
SHORTNESS OF BREATH: 1
COUGH: 1
NAUSEA: 1
CONSTIPATION: 1

## 2023-09-19 NOTE — H&P
Patient referred for evaluation of GERD and constipation. Complains of abdominal pain and distension. Has been going on a long while. Reports multiple scopes in the past.  She has difficulty having a bowel movement, and pain through out her abdomen particularly on the right. Pain radiates to her back. Her previous scopes were done in [facility]. Uses pain medication and MiraLax. Tried MiraLax for 10 days twice a day for about 10 days. She states she does not have a bowel, last one was maybe a week ago. Her bowel movements have been less than weekly,   Occasionally notices blood. No weight loss. She may be having a fever and chills sometimes. Also has headaches. She may have had colon polyps previously. Also is having GERD. Coughs a lot at night, productive with mucous. Has heart burn every night, with the coughing. Will be bad enough that she vomiting. Interview was carried out with a professional , along with assistance by her son. The  spoke a slightly different dialect and the patient is hard of hearing. Past Medical History:   Diagnosis Date    Chronic kidney disease     family denies    Diabetes mellitus ([address])     borderline diabetic    HTN (hypertension)      Past Surgical History:   Procedure Laterality Date    EXTERNAL EAR SURGERY      20 years ago     No current facility-administered medications for this encounter. Current Outpatient Medications   Medication Sig Dispense Refill    polyethylene glycol-electrolytes (NULYTELY WITH FLAVOR PACKS) 420 g solution Mix and take as directed. Beginning at 4:00 pm the day before your procedure, drink an 8 ounce glass every 10-15 minutes until gone. 4000 mL 0    bisacodyl 5 MG EC tablet Take two 5 mg tablets by mouth at 10:00 am & 12:00 pm the first day of bowel prep (Monday) then take 2 tablets by mouth at 12:00 pm the day before your procedure (Tuesday).  6 tablet 0    Blood Glucose Monitoring Suppl

## 2023-09-20 ENCOUNTER — ANESTHESIA (OUTPATIENT)
Dept: OPERATING ROOM | Age: 78
End: 2023-09-20
Payer: MEDICAID

## 2023-09-20 ENCOUNTER — ANESTHESIA EVENT (OUTPATIENT)
Dept: OPERATING ROOM | Age: 78
End: 2023-09-20
Payer: MEDICAID

## 2023-09-20 ENCOUNTER — HOSPITAL ENCOUNTER (OUTPATIENT)
Age: 78
Setting detail: OUTPATIENT SURGERY
Discharge: HOME | End: 2023-09-20
Attending: SURGERY | Admitting: SURGERY
Payer: MEDICAID

## 2023-09-20 VITALS
DIASTOLIC BLOOD PRESSURE: 61 MMHG | HEIGHT: 63 IN | HEART RATE: 65 BPM | OXYGEN SATURATION: 96 % | SYSTOLIC BLOOD PRESSURE: 121 MMHG | BODY MASS INDEX: 27.75 KG/M2 | TEMPERATURE: 97.5 F | WEIGHT: 156.6 LBS | RESPIRATION RATE: 16 BRPM

## 2023-09-20 DIAGNOSIS — R30.0 DYSURIA: ICD-10-CM

## 2023-09-20 DIAGNOSIS — R10.31 RIGHT LOWER QUADRANT ABDOMINAL PAIN: ICD-10-CM

## 2023-09-20 DIAGNOSIS — K21.9 GASTROESOPHAGEAL REFLUX DISEASE, UNSPECIFIED WHETHER ESOPHAGITIS PRESENT: ICD-10-CM

## 2023-09-20 DIAGNOSIS — R05.8 PRODUCTIVE COUGH: ICD-10-CM

## 2023-09-20 PROBLEM — K59.09 CHRONIC CONSTIPATION: Status: ACTIVE | Noted: 2023-09-20

## 2023-09-20 LAB — GLUCOSE BLD-MCNC: 122 MG/DL (ref 65–105)

## 2023-09-20 PROCEDURE — 88305 TISSUE EXAM BY PATHOLOGIST: CPT

## 2023-09-20 PROCEDURE — 3609012400 HC EGD TRANSORAL BIOPSY SINGLE/MULTIPLE: Performed by: SURGERY

## 2023-09-20 PROCEDURE — 7100000011 HC PHASE II RECOVERY - ADDTL 15 MIN: Performed by: SURGERY

## 2023-09-20 PROCEDURE — 7100000010 HC PHASE II RECOVERY - FIRST 15 MIN: Performed by: SURGERY

## 2023-09-20 PROCEDURE — 2580000003 HC RX 258: Performed by: SURGERY

## 2023-09-20 PROCEDURE — 3700000000 HC ANESTHESIA ATTENDED CARE: Performed by: SURGERY

## 2023-09-20 PROCEDURE — 3609010600 HC COLONOSCOPY POLYPECTOMY SNARE/COLD BIOPSY: Performed by: SURGERY

## 2023-09-20 PROCEDURE — 3700000001 HC ADD 15 MINUTES (ANESTHESIA): Performed by: SURGERY

## 2023-09-20 PROCEDURE — 82947 ASSAY GLUCOSE BLOOD QUANT: CPT

## 2023-09-20 PROCEDURE — 6360000002 HC RX W HCPCS

## 2023-09-20 PROCEDURE — 2709999900 HC NON-CHARGEABLE SUPPLY: Performed by: SURGERY

## 2023-09-20 PROCEDURE — 2500000003 HC RX 250 WO HCPCS

## 2023-09-20 RX ORDER — SODIUM CHLORIDE, SODIUM LACTATE, POTASSIUM CHLORIDE, CALCIUM CHLORIDE 600; 310; 30; 20 MG/100ML; MG/100ML; MG/100ML; MG/100ML
INJECTION, SOLUTION INTRAVENOUS CONTINUOUS
Status: DISCONTINUED | OUTPATIENT
Start: 2023-09-20 | End: 2023-09-20 | Stop reason: HOSPADM

## 2023-09-20 RX ORDER — LIDOCAINE HYDROCHLORIDE 40 MG/ML
INJECTION, SOLUTION RETROBULBAR; TOPICAL PRN
Status: DISCONTINUED | OUTPATIENT
Start: 2023-09-20 | End: 2023-09-20 | Stop reason: SDUPTHER

## 2023-09-20 RX ORDER — PROPOFOL 10 MG/ML
INJECTION, EMULSION INTRAVENOUS PRN
Status: DISCONTINUED | OUTPATIENT
Start: 2023-09-20 | End: 2023-09-20 | Stop reason: SDUPTHER

## 2023-09-20 RX ADMIN — PROPOFOL 50 MG: 10 INJECTION, EMULSION INTRAVENOUS at 10:10

## 2023-09-20 RX ADMIN — PROPOFOL 50 MG: 10 INJECTION, EMULSION INTRAVENOUS at 10:25

## 2023-09-20 RX ADMIN — LIDOCAINE HYDROCHLORIDE 80 MG: 40 INJECTION, SOLUTION RETROBULBAR; TOPICAL at 10:10

## 2023-09-20 RX ADMIN — PROPOFOL 50 MG: 10 INJECTION, EMULSION INTRAVENOUS at 10:14

## 2023-09-20 RX ADMIN — PROPOFOL 50 MG: 10 INJECTION, EMULSION INTRAVENOUS at 10:39

## 2023-09-20 RX ADMIN — PROPOFOL 50 MG: 10 INJECTION, EMULSION INTRAVENOUS at 10:20

## 2023-09-20 RX ADMIN — PROPOFOL 50 MG: 10 INJECTION, EMULSION INTRAVENOUS at 10:11

## 2023-09-20 RX ADMIN — PROPOFOL 50 MG: 10 INJECTION, EMULSION INTRAVENOUS at 10:30

## 2023-09-20 RX ADMIN — PROPOFOL 50 MG: 10 INJECTION, EMULSION INTRAVENOUS at 10:17

## 2023-09-20 RX ADMIN — SODIUM CHLORIDE, POTASSIUM CHLORIDE, SODIUM LACTATE AND CALCIUM CHLORIDE: 600; 310; 30; 20 INJECTION, SOLUTION INTRAVENOUS at 09:40

## 2023-09-20 RX ADMIN — PROPOFOL 50 MG: 10 INJECTION, EMULSION INTRAVENOUS at 10:35

## 2023-09-20 ASSESSMENT — PAIN SCALES - GENERAL: PAINLEVEL_OUTOF10: 0

## 2023-09-20 ASSESSMENT — PAIN - FUNCTIONAL ASSESSMENT: PAIN_FUNCTIONAL_ASSESSMENT: 0-10

## 2023-09-20 NOTE — ANESTHESIA PRE PROCEDURE
08/07/2023 08:40 AM       CMP:   Lab Results   Component Value Date/Time     08/07/2023 08:40 AM    K 4.3 08/07/2023 08:40 AM     08/07/2023 08:40 AM    CO2 27 08/07/2023 08:40 AM    BUN 18 08/07/2023 08:40 AM    CREATININE 1.1 08/07/2023 08:40 AM    GFRAA 54 01/26/2021 09:42 AM    LABGLOM 51 08/07/2023 08:40 AM    GLUCOSE 107 08/07/2023 08:40 AM    PROT 7.0 08/07/2023 08:40 AM    CALCIUM 9.4 08/07/2023 08:40 AM    BILITOT 0.4 08/07/2023 08:40 AM    ALKPHOS 65 08/07/2023 08:40 AM    AST 16 08/07/2023 08:40 AM    ALT 16 08/07/2023 08:40 AM       POC Tests: No results for input(s): \"POCGLU\", \"POCNA\", \"POCK\", \"POCCL\", \"POCBUN\", \"POCHEMO\", \"POCHCT\" in the last 72 hours. Coags:   Lab Results   Component Value Date/Time    PROTIME 9.6 11/23/2015 11:18 AM    INR 0.9 11/23/2015 11:18 AM       HCG (If Applicable): No results found for: \"PREGTESTUR\", \"PREGSERUM\", \"HCG\", \"HCGQUANT\"     ABGs: No results found for: \"PHART\", \"PO2ART\", \"KYP5WXK\", \"OLC6UVP\", \"BEART\", \"M1DCAKTN\"     Type & Screen (If Applicable):  No results found for: \"LABABO\", \"LABRH\"    Drug/Infectious Status (If Applicable):  No results found for: \"HIV\", \"HEPCAB\"    COVID-19 Screening (If Applicable):   Lab Results   Component Value Date/Time    COVID19 DETECTED 12/10/2022 12:42 PM    COVID19 Not Detected 07/03/2020 07:04 AM           Anesthesia Evaluation  Patient summary reviewed and Nursing notes reviewed  Airway: Mallampati: II  TM distance: >3 FB     Mouth opening: > = 3 FB   Dental: normal exam         Pulmonary:normal exam                               Cardiovascular:    (+) hypertension:, valvular problems/murmurs: MR,       ECG reviewed      Echocardiogram reviewed                  Neuro/Psych:   (+) psychiatric history:depression/anxiety             GI/Hepatic/Renal:   (+) GERD:,           Endo/Other:    (+) Diabetes, hypothyroidism::., .                 Abdominal: normal exam            Vascular:           Other Findings:

## 2023-09-20 NOTE — ANESTHESIA POSTPROCEDURE EVALUATION
Department of Anesthesiology  Postprocedure Note    Patient: Fidencio Rice  MRN: [id number]  YOB: 1945  Date of evaluation: 9/20/2023      Procedure Summary     Date: 09/20/23 Room / Location: [facility]    Anesthesia Start: 1353 Anesthesia Stop: 2244    Procedures:       EGD Biopsy (Esophagus)      COLONOSCOPY POLYPECTOMY Snare/COLD BIOPSY Diagnosis:       Gastroesophageal reflux disease, unspecified whether esophagitis present      Productive cough      Right lower quadrant abdominal pain      Dysuria      (Gastroesophageal reflux disease, unspecified whether esophagitis present [K21.9])      (Productive cough [R05.8])      (Right lower quadrant abdominal pain [R10.31])      (Dysuria [R30.0])    Surgeons: Isabel Torrez MD Responsible Provider: BRITTANY Horne CRNA    Anesthesia Type: MAC ASA Status: 3          Anesthesia Type: No value filed.     Alyssa Phase I: Alyssa Score: 10    Alyssa Phase II:        Anesthesia Post Evaluation    Patient location during evaluation: bedside  Patient participation: waiting for patient participation  Level of consciousness: responsive to light touch  Pain score: 0  Airway patency: patent  Nausea & Vomiting: no nausea and no vomiting  Complications: no  Cardiovascular status: blood pressure returned to baseline  Respiratory status: acceptable  Hydration status: euvolemic  Pain management: adequate

## 2023-09-20 NOTE — DISCHARGE INSTRUCTIONS
1) You EGD (upper endoscopy looks pretty normal  2) Biopsies were done. 3) You did have several colon polyps, which were removed. 4) You will be called with the biopsy results and recommendations. 5) You have minimal diverticulosis and small hemorrhoids  6) None of these things necessarily cause your pain. I suspect you have irritable bowel syndrome, IBS. In formation is included below. DISCHARGE INSTRUCTIONS FOLLOWING EGD & COLONOSCOPY    Activity  You have received sedation. Your judgement and coordination may be impaired. Do not drive or operate any machinery today. Do not make personal, medical, legal or business decisions today. Do not consume alcohol, tranquilizers or sleeping medications today unless otherwise instructed by your physician. Rest today. You may resume normal activity tomorrow. Because air is put into your stomach and colon during these procedures, it is normal to belch, and pass large amounts of air from your rectum. Feelings of bloating, gas or cramping may persist for 24 hours. You may not have a bowel movement for 1-3 days after these procedures. Diet  Begin with sips of clear liquids and if no nausea, you may progress to your normal diet, unless otherwise instructed. Medications  You may resume your usual medications, unless otherwise instructed. Follow-Up  As instructed. CALL YOUR PHYSICIAN if you experience any of the following:  Bleeding from your rectum (a teaspoonful or more)  Severe abdominal pain/cramping and/or distention that is not relieved by passing gas. Chest pain that is not relieved by belching. Persistent nausea and vomiting. Signs of infection including fever, chills, redness or swelling @ the IV site. If you have questions/problems, call [phone] [facility]) or after regular business hours call [phone] [facility])      ? ????? ??? ????? ???????   Learning About Colonoscopy  ?? ?? ????? ????????  ?????

## 2023-09-20 NOTE — OP NOTE
stomach and duodenum. Office looks pretty normal as does her stomach and duodenum pulled on the distal duodenum. Distal duodenal biopsies were done to look for celiac disease and underlying problems that may not be obvious. Scope was brought back in the stomach and retroflexed and she does have a small hiatal hernia. Biopsies of the antrum and then the body and a separate specimen the distal esophagus were done as well. These were done to look for underlying inflammation that may not be apparent. She was repositioned and colonoscopy was performed. Her bowel prep was good. She had some retained some particulate slightly prep pasty stool most this could be irrigated and suction without too much difficulty. It was ultimately passed fully around the cecum. I could visualize a fairly prominent ileocecal valve. Barely get the tip of the scope to go into the ileocecal valve and after multiple attempts to advance it I stopped. I went ahead and did biopsies as documented on the way out. She had several small polyps and some right-sided colon biopsies as well look for underlying inflammation microcytic colitis. Also had a larger polyp near the hepatic flexure which was removed with a snare this likely represents a submucosal lipoma. She has some particular in her distal sigmoid. This is a short segment of fairly intense diverticular disease but I do not see any obvious haustral hypertrophy although there might be a little hint of some in that location. Retroflexed scope the rectum she does have some internal hemorrhoids. Most likely her symptoms are functional GERD and IBS constipation dominant. We will see what the biopsy results show and make additional recommendations based upon those results.     Electronically signed by Adan Mckeon MD on 9/20/2023 at 10:44 AM

## 2023-09-22 LAB — SURGICAL PATHOLOGY REPORT: NORMAL

## 2023-10-02 ENCOUNTER — TELEPHONE (OUTPATIENT)
Dept: INTERNAL MEDICINE | Age: 78
End: 2023-10-02

## 2023-10-02 DIAGNOSIS — B37.31 VAGINAL YEAST INFECTION: Primary | ICD-10-CM

## 2023-10-02 RX ORDER — FLUCONAZOLE 150 MG/1
150 TABLET ORAL ONCE
Qty: 1 TABLET | Refills: 0 | Status: SHIPPED | OUTPATIENT
Start: 2023-10-02 | End: 2023-10-02

## 2023-10-02 NOTE — TELEPHONE ENCOUNTER
Patients daughter called and states patient complains of vaginal itching and discharge she is requesting a prescription for a yeast infection be sent into [facility].

## 2023-10-12 DIAGNOSIS — R73.03 PREDIABETES: ICD-10-CM

## 2023-10-12 RX ORDER — BLOOD SUGAR DIAGNOSTIC
STRIP MISCELLANEOUS
Qty: 100 STRIP | Refills: 3 | Status: SHIPPED | OUTPATIENT
Start: 2023-10-12

## 2023-10-19 ENCOUNTER — OFFICE VISIT (OUTPATIENT)
Dept: INTERNAL MEDICINE | Age: 78
End: 2023-10-19
Payer: MEDICAID

## 2023-10-19 ENCOUNTER — HOSPITAL ENCOUNTER (OUTPATIENT)
Age: 78
Discharge: HOME | End: 2023-10-19
Payer: MEDICAID

## 2023-10-19 VITALS
WEIGHT: 163 LBS | DIASTOLIC BLOOD PRESSURE: 74 MMHG | BODY MASS INDEX: 28.88 KG/M2 | SYSTOLIC BLOOD PRESSURE: 128 MMHG | HEART RATE: 86 BPM | OXYGEN SATURATION: 97 % | RESPIRATION RATE: 16 BRPM | HEIGHT: 63 IN

## 2023-10-19 DIAGNOSIS — M25.50 ARTHRALGIA, UNSPECIFIED JOINT: ICD-10-CM

## 2023-10-19 DIAGNOSIS — I10 HYPERTENSION, UNSPECIFIED TYPE: ICD-10-CM

## 2023-10-19 DIAGNOSIS — E03.9 HYPOTHYROIDISM, UNSPECIFIED TYPE: ICD-10-CM

## 2023-10-19 DIAGNOSIS — J30.9 ALLERGIC RHINITIS, UNSPECIFIED SEASONALITY, UNSPECIFIED TRIGGER: ICD-10-CM

## 2023-10-19 DIAGNOSIS — I10 HYPERTENSION, UNSPECIFIED TYPE: Primary | ICD-10-CM

## 2023-10-19 LAB
BASOPHILS # BLD: 0.05 K/UL (ref 0–0.2)
BASOPHILS NFR BLD: 1 % (ref 0–2)
EOSINOPHIL # BLD: 0.35 K/UL (ref 0–0.44)
EOSINOPHILS RELATIVE PERCENT: 5 % (ref 1–4)
ERYTHROCYTE [DISTWIDTH] IN BLOOD BY AUTOMATED COUNT: 15.2 % (ref 11.8–14.4)
ERYTHROCYTE [SEDIMENTATION RATE] IN BLOOD BY PHOTOMETRIC METHOD: 7 MM/HR (ref 0–30)
HCT VFR BLD AUTO: 36.3 % (ref 36.3–47.1)
HGB BLD-MCNC: 12.1 G/DL (ref 11.9–15.1)
IMM GRANULOCYTES # BLD AUTO: <0.03 K/UL (ref 0–0.3)
IMM GRANULOCYTES NFR BLD: 0 %
LYMPHOCYTES NFR BLD: 2.61 K/UL (ref 1.1–3.7)
LYMPHOCYTES RELATIVE PERCENT: 39 % (ref 24–43)
MCH RBC QN AUTO: 29.2 PG (ref 25.2–33.5)
MCHC RBC AUTO-ENTMCNC: 33.3 G/DL (ref 25.2–33.5)
MCV RBC AUTO: 87.7 FL (ref 82.6–102.9)
MONOCYTES NFR BLD: 0.52 K/UL (ref 0.1–1.2)
MONOCYTES NFR BLD: 8 % (ref 3–12)
NEUTROPHILS NFR BLD: 47 % (ref 36–65)
NEUTS SEG NFR BLD: 3.2 K/UL (ref 1.5–8.1)
NRBC BLD-RTO: 0 PER 100 WBC
PLATELET # BLD AUTO: 239 K/UL (ref 138–453)
PMV BLD AUTO: 11.4 FL (ref 8.1–13.5)
RBC # BLD AUTO: 4.14 M/UL (ref 3.95–5.11)
RBC # BLD: ABNORMAL 10*6/UL
RHEUMATOID FACT SER NEPH-ACNC: 11.2 IU/ML
WBC OTHER # BLD: 6.8 K/UL (ref 3.5–11.3)

## 2023-10-19 PROCEDURE — 85652 RBC SED RATE AUTOMATED: CPT

## 2023-10-19 PROCEDURE — G8400 PT W/DXA NO RESULTS DOC: HCPCS | Performed by: INTERNAL MEDICINE

## 2023-10-19 PROCEDURE — G0008 ADMIN INFLUENZA VIRUS VAC: HCPCS | Performed by: INTERNAL MEDICINE

## 2023-10-19 PROCEDURE — PBSHW INFLUENZA, FLUAD, (AGE 65 Y+), IM, PF, 0.5 ML: Performed by: INTERNAL MEDICINE

## 2023-10-19 PROCEDURE — 99214 OFFICE O/P EST MOD 30 MIN: CPT | Performed by: INTERNAL MEDICINE

## 2023-10-19 PROCEDURE — G8484 FLU IMMUNIZE NO ADMIN: HCPCS | Performed by: INTERNAL MEDICINE

## 2023-10-19 PROCEDURE — 86431 RHEUMATOID FACTOR QUANT: CPT

## 2023-10-19 PROCEDURE — 3074F SYST BP LT 130 MM HG: CPT | Performed by: INTERNAL MEDICINE

## 2023-10-19 PROCEDURE — 1036F TOBACCO NON-USER: CPT | Performed by: INTERNAL MEDICINE

## 2023-10-19 PROCEDURE — 3078F DIAST BP <80 MM HG: CPT | Performed by: INTERNAL MEDICINE

## 2023-10-19 PROCEDURE — 1090F PRES/ABSN URINE INCON ASSESS: CPT | Performed by: INTERNAL MEDICINE

## 2023-10-19 PROCEDURE — 86225 DNA ANTIBODY NATIVE: CPT

## 2023-10-19 PROCEDURE — G8427 DOCREV CUR MEDS BY ELIG CLIN: HCPCS | Performed by: INTERNAL MEDICINE

## 2023-10-19 PROCEDURE — 99212 OFFICE O/P EST SF 10 MIN: CPT | Performed by: INTERNAL MEDICINE

## 2023-10-19 PROCEDURE — 86038 ANTINUCLEAR ANTIBODIES: CPT

## 2023-10-19 PROCEDURE — G8417 CALC BMI ABV UP PARAM F/U: HCPCS | Performed by: INTERNAL MEDICINE

## 2023-10-19 PROCEDURE — 1123F ACP DISCUSS/DSCN MKR DOCD: CPT | Performed by: INTERNAL MEDICINE

## 2023-10-19 PROCEDURE — 36415 COLL VENOUS BLD VENIPUNCTURE: CPT

## 2023-10-19 PROCEDURE — 85025 COMPLETE CBC W/AUTO DIFF WBC: CPT

## 2023-10-19 RX ORDER — BACLOFEN 5 MG/1
5 TABLET ORAL 2 TIMES DAILY PRN
Qty: 20 TABLET | Refills: 2 | Status: SHIPPED | OUTPATIENT
Start: 2023-10-19

## 2023-10-19 RX ORDER — CELECOXIB 100 MG/1
100 CAPSULE ORAL 2 TIMES DAILY
Qty: 20 CAPSULE | Refills: 2 | Status: SHIPPED | OUTPATIENT
Start: 2023-10-19

## 2023-10-19 RX ORDER — CELECOXIB 100 MG/1
100 CAPSULE ORAL 2 TIMES DAILY
Qty: 20 CAPSULE | Refills: 0 | Status: SHIPPED | OUTPATIENT
Start: 2023-10-19 | End: 2023-10-19

## 2023-10-19 NOTE — PROGRESS NOTES
capsule, Take 1 capsule by mouth every morning, Disp: 90 capsule, Rfl: 3    losartan (COZAAR) 50 MG tablet, Take 1 tablet by mouth daily, Disp: 90 tablet, Rfl: 3    levothyroxine (EUTHYROX) 88 MCG tablet, Take 1 tablet by mouth Daily, Disp: 90 tablet, Rfl: 3    metFORMIN (GLUCOPHAGE) 500 MG tablet, Take 1 tablet by mouth 2 times daily (with meals), Disp: 180 tablet, Rfl: 3    triamterene-hydroCHLOROthiazide (MAXZIDE-25) 37.5-25 MG per tablet, Take 1 tablet by mouth daily, Disp: 90 tablet, Rfl: 3    propranolol (INDERAL LA) 60 MG extended release capsule, Take 1 capsule by mouth daily, Disp: 90 capsule, Rfl: 3    meclizine (ANTIVERT) 25 MG tablet, Take 1 tablet by mouth 3 times daily as needed for Dizziness, Disp: 90 tablet, Rfl: 3    escitalopram (LEXAPRO) 20 MG tablet, Take 1.5 tablets by mouth daily, Disp: 90 tablet, Rfl: 3    clindamycin (CLEOCIN) 2 % vaginal cream, Place vaginally nightly for 14 nights, Disp: 2 Tube, Rfl: 0    primidone (MYSOLINE) 50 MG tablet, take 1/2 tablet by mouth at bedtime for TREMORS MAY INCREASE TO 1 tablet at bedtime if needed, Disp: 30 tablet, Rfl: 1    triamcinolone (KENALOG) 0.1 % cream, Apply topically 2 times daily. , Disp: 30 g, Rfl: 0    dimenhyDRINATE (DRAMAMINE) 50 MG tablet, Take 1 tablet by mouth nightly as needed, Disp: , Rfl:     aspirin 325 MG tablet, Take 1 tablet by mouth daily (Patient not taking: Reported on 9/20/2023), Disp: , Rfl:     Allergies  Patient has no known allergies.     Past Medical History:   Diagnosis Date    Chronic kidney disease     family denies    Diabetes mellitus ([address])     borderline diabetic    HTN (hypertension)      Past Surgical History:   Procedure Laterality Date    COLONOSCOPY N/A 09/20/2023    with polypectomy and snare/cold biopsy, x 3 small adenomatous polyps, x 1 lipoma, sigmoid diverticulois, hemorrhoids - performed by Joselyn Webb MD at [address]      20 years ago    UPPER GASTROINTESTINAL ENDOSCOPY N/A

## 2023-10-24 LAB
ANA SER QL IA: NEGATIVE
DSDNA IGG SER QL IA: 1 IU/ML
NUCLEAR IGG SER IA-RTO: 0.2 U/ML

## 2023-10-25 ENCOUNTER — TELEPHONE (OUTPATIENT)
Dept: NEUROLOGY | Age: 78
End: 2023-10-25

## 2023-10-25 DIAGNOSIS — F03.90 DEMENTIA, UNSPECIFIED DEMENTIA SEVERITY, UNSPECIFIED DEMENTIA TYPE, UNSPECIFIED WHETHER BEHAVIORAL, PSYCHOTIC, OR MOOD DISTURBANCE OR ANXIETY (HCC): ICD-10-CM

## 2023-10-25 DIAGNOSIS — R26.89 BALANCE PROBLEM: Primary | ICD-10-CM

## 2023-10-25 NOTE — TELEPHONE ENCOUNTER
Pt was last seen 3 years ago - can we have a new referral placed? Seeing Dr. Sandra Pennington next Friday. Thank you!

## 2023-10-31 ENCOUNTER — OFFICE VISIT (OUTPATIENT)
Dept: ORTHOPEDIC SURGERY | Age: 78
End: 2023-10-31
Payer: MEDICAID

## 2023-10-31 ENCOUNTER — HOSPITAL ENCOUNTER (OUTPATIENT)
Dept: BONE DENSITY | Age: 78
Discharge: HOME | End: 2023-11-02
Attending: INTERNAL MEDICINE
Payer: MEDICAID

## 2023-10-31 VITALS
DIASTOLIC BLOOD PRESSURE: 70 MMHG | HEART RATE: 69 BPM | WEIGHT: 163 LBS | SYSTOLIC BLOOD PRESSURE: 118 MMHG | HEIGHT: 63 IN | BODY MASS INDEX: 28.88 KG/M2

## 2023-10-31 DIAGNOSIS — M17.0 OSTEOARTHRITIS OF BOTH KNEES, UNSPECIFIED OSTEOARTHRITIS TYPE: Primary | ICD-10-CM

## 2023-10-31 DIAGNOSIS — M25.50 ARTHRALGIA, UNSPECIFIED JOINT: ICD-10-CM

## 2023-10-31 PROCEDURE — 99214 OFFICE O/P EST MOD 30 MIN: CPT | Performed by: ORTHOPAEDIC SURGERY

## 2023-10-31 PROCEDURE — 20610 DRAIN/INJ JOINT/BURSA W/O US: CPT | Performed by: PHYSICIAN ASSISTANT

## 2023-10-31 PROCEDURE — 77080 DXA BONE DENSITY AXIAL: CPT

## 2023-10-31 RX ORDER — BUPIVACAINE HYDROCHLORIDE 5 MG/ML
5 INJECTION, SOLUTION PERINEURAL ONCE
Status: COMPLETED | OUTPATIENT
Start: 2023-10-31 | End: 2023-10-31

## 2023-10-31 RX ORDER — TRIAMCINOLONE ACETONIDE 40 MG/ML
80 INJECTION, SUSPENSION INTRA-ARTICULAR; INTRAMUSCULAR ONCE
Status: COMPLETED | OUTPATIENT
Start: 2023-10-31 | End: 2023-10-31

## 2023-10-31 RX ADMIN — BUPIVACAINE HYDROCHLORIDE 25 MG: 5 INJECTION, SOLUTION PERINEURAL at 09:52

## 2023-10-31 RX ADMIN — TRIAMCINOLONE ACETONIDE 80 MG: 200 INJECTION, SUSPENSION INTRA-ARTICULAR; INTRAMUSCULAR at 09:53

## 2023-10-31 RX ADMIN — BUPIVACAINE HYDROCHLORIDE 25 MG: 5 INJECTION, SOLUTION PERINEURAL at 09:51

## 2023-10-31 NOTE — PROGRESS NOTES
medial compartment. Diagnosis Orders   1. Osteoarthritis of both knees, unspecified osteoarthritis type              PLAN:  Repeat corticosteroid injections of both knees here today patient agreed. No orders of the defined types were placed in this encounter. Procedure:  Sterile technique the anterolateral portal of both knees washed and alcohol pad. A 22-gauge needle was used to introduce 5 cc of half percent Marcaine and 80 mg of Kenalog into both knee joints. This was well-tolerated. Good hemostasis was noted. Dry sterile bandages applied.       Electronically signed by Boubacar Phillips PA-C on 10/31/2023 at 9:35 AM

## 2023-11-02 ENCOUNTER — TELEPHONE (OUTPATIENT)
Dept: INTERNAL MEDICINE | Age: 78
End: 2023-11-02

## 2023-11-02 DIAGNOSIS — E28.2 PCOS (POLYCYSTIC OVARIAN SYNDROME): Primary | ICD-10-CM

## 2023-11-02 RX ORDER — ESCITALOPRAM OXALATE 20 MG/1
30 TABLET ORAL DAILY
Qty: 90 TABLET | Refills: 3 | Status: SHIPPED | OUTPATIENT
Start: 2023-11-02 | End: 2023-11-16 | Stop reason: SDUPTHER

## 2023-11-02 RX ORDER — LOSARTAN POTASSIUM 50 MG/1
50 TABLET ORAL DAILY
Qty: 90 TABLET | Refills: 3 | Status: SHIPPED | OUTPATIENT
Start: 2023-11-02

## 2023-11-02 RX ORDER — PROPRANOLOL HCL 60 MG
60 CAPSULE, EXTENDED RELEASE 24HR ORAL DAILY
Qty: 90 CAPSULE | Refills: 3 | Status: SHIPPED | OUTPATIENT
Start: 2023-11-02

## 2023-11-02 NOTE — TELEPHONE ENCOUNTER
Kirstin Putnam called and states that you said you were going to send in a prescription for this patient. Please advise. Please send to [phone]

## 2023-11-02 NOTE — TELEPHONE ENCOUNTER
I sent the propranolol, which is the the new tremor medication, in addition to the losartan as well as the Lexapro which is the mood medicine, which she was on, to the pharmacy

## 2023-11-08 ENCOUNTER — OFFICE VISIT (OUTPATIENT)
Dept: OBGYN | Age: 78
End: 2023-11-08
Payer: MEDICAID

## 2023-11-08 ENCOUNTER — HOSPITAL ENCOUNTER (OUTPATIENT)
Age: 78
Setting detail: SPECIMEN
Discharge: HOME | End: 2023-11-08
Payer: MEDICAID

## 2023-11-08 VITALS
HEART RATE: 72 BPM | DIASTOLIC BLOOD PRESSURE: 62 MMHG | WEIGHT: 161.6 LBS | HEIGHT: 63 IN | RESPIRATION RATE: 16 BRPM | BODY MASS INDEX: 28.63 KG/M2 | SYSTOLIC BLOOD PRESSURE: 132 MMHG

## 2023-11-08 DIAGNOSIS — R39.89 BLADDER PAIN: ICD-10-CM

## 2023-11-08 DIAGNOSIS — N76.1 CHRONIC VAGINITIS: ICD-10-CM

## 2023-11-08 DIAGNOSIS — Z12.4 ENCOUNTER FOR PAPANICOLAOU SMEAR FOR CERVICAL CANCER SCREENING: ICD-10-CM

## 2023-11-08 DIAGNOSIS — N76.1 CHRONIC VAGINITIS: Primary | ICD-10-CM

## 2023-11-08 DIAGNOSIS — N64.4 BREAST PAIN: ICD-10-CM

## 2023-11-08 DIAGNOSIS — M85.80 OSTEOPENIA, UNSPECIFIED LOCATION: ICD-10-CM

## 2023-11-08 LAB
BILIRUB UR QL STRIP: NEGATIVE
CANDIDA SPECIES: NEGATIVE
CLARITY UR: CLEAR
COLOR UR: YELLOW
COMMENT: NORMAL
GARDNERELLA VAGINALIS: NEGATIVE
GLUCOSE UR STRIP-MCNC: NEGATIVE MG/DL
HGB UR QL STRIP.AUTO: NEGATIVE
KETONES UR STRIP-MCNC: NEGATIVE MG/DL
LEUKOCYTE ESTERASE UR QL STRIP: NEGATIVE
NITRITE UR QL STRIP: NEGATIVE
PH UR STRIP: 6 [PH] (ref 5–6)
PROT UR STRIP-MCNC: NEGATIVE MG/DL
SOURCE: NORMAL
SP GR UR STRIP: 1.01 (ref 1.01–1.02)
TRICHOMONAS: NEGATIVE
UROBILINOGEN UR STRIP-ACNC: NORMAL EU/DL (ref 0–1)

## 2023-11-08 PROCEDURE — 87510 GARDNER VAG DNA DIR PROBE: CPT

## 2023-11-08 PROCEDURE — 99205 OFFICE O/P NEW HI 60 MIN: CPT | Performed by: OBSTETRICS & GYNECOLOGY

## 2023-11-08 PROCEDURE — 87480 CANDIDA DNA DIR PROBE: CPT

## 2023-11-08 PROCEDURE — 87624 HPV HI-RISK TYP POOLED RSLT: CPT

## 2023-11-08 PROCEDURE — 87660 TRICHOMONAS VAGIN DIR PROBE: CPT

## 2023-11-08 PROCEDURE — 81003 URINALYSIS AUTO W/O SCOPE: CPT

## 2023-11-08 PROCEDURE — 87491 CHLMYD TRACH DNA AMP PROBE: CPT

## 2023-11-08 PROCEDURE — 88175 CYTOPATH C/V AUTO FLUID REDO: CPT

## 2023-11-08 PROCEDURE — 99203 OFFICE O/P NEW LOW 30 MIN: CPT | Performed by: OBSTETRICS & GYNECOLOGY

## 2023-11-08 PROCEDURE — 87591 N.GONORRHOEAE DNA AMP PROB: CPT

## 2023-11-08 NOTE — PROGRESS NOTES
Balance problem 06/11/2020    Hyperglycemia 06/11/2020    Varicose veins of left lower extremity with pain 05/28/2020    Hypothyroidism 04/28/2020    Chest pain 11/06/2015    Hypertension 11/06/2015    CKD (chronic kidney disease) stage 3, GFR 30-59 ml/min ([facility]) 11/06/2015    Nausea 11/06/2015       PLAN:  Return in about 4 weeks (around 12/6/2023) for Follow-Up On Current Problem. Calcium and Vit d dosing   Dexa 1-2 yr fu  PAP and cultures collected  Diagnostic mammogram ordered  UA C&S ordered    Return to the office in 4 weeks. Barrier recommendations and STD counseling was completed  Counseled on preventative health maintenance follow-up. Orders Placed This Encounter   Procedures    Vaginitis DNA Probe    C.trachomatis N.gonorrhoeae DNA    KAITLIN DIGITAL DIAGNOSTIC W OR WO CAD BILATERAL     Standing Status:   Future     Standing Expiration Date:   1/8/2025     Order Specific Question:   Reason for exam:     Answer:   beast pain R>L    PAP SMEAR     Patient History:    No LMP recorded. Patient is postmenopausal.  OBGYN Status: Postmenopausal  Past Surgical History:  09/20/2023: COLONOSCOPY; N/A      Comment:  with polypectomy and snare/cold biopsy, x 3 small                adenomatous polyps, x 1 lipoma, sigmoid diverticulois,                hemorrhoids - performed by Edwin Rose MD at [address]  No date: EXTERNAL EAR SURGERY      Comment:  20 years ago  09/20/2023: UPPER GASTROINTESTINAL ENDOSCOPY; N/A      Comment:  EGD Biopsy mild chronic gastritis, small hiatal hernia -               performed by Edwin Rose MD at [address] History    Tobacco Use      Smoking status: Never      Smokeless tobacco: Never       Standing Status:   Future     Standing Expiration Date:   11/8/2024     Order Specific Question:   Collection Type     Answer:    Thin Prep     Order Specific Question:   Prior Abnormal Pap Test     Answer:   No     Order Specific Question:   Screening or Diagnostic

## 2023-11-09 DIAGNOSIS — M81.0 SENILE OSTEOPOROSIS: Primary | ICD-10-CM

## 2023-11-09 LAB
C TRACH DNA SPEC QL PROBE+SIG AMP: NEGATIVE
HPV I/H RISK 4 DNA CVX QL NAA+PROBE: NOT DETECTED
HPV SAMPLE: NORMAL
HPV, INTERPRETATION: NORMAL
HPV16 DNA CVX QL NAA+PROBE: NOT DETECTED
HPV18 DNA CVX QL NAA+PROBE: NOT DETECTED
N GONORRHOEA DNA SPEC QL PROBE+SIG AMP: NEGATIVE
SPECIMEN DESCRIPTION: NORMAL
SPECIMEN DESCRIPTION: NORMAL

## 2023-11-09 RX ORDER — ZOLEDRONIC ACID 5 MG/100ML
5 INJECTION, SOLUTION INTRAVENOUS ONCE
OUTPATIENT
Start: 2023-11-09 | End: 2023-11-09

## 2023-11-09 RX ORDER — SODIUM CHLORIDE 9 MG/ML
5-250 INJECTION, SOLUTION INTRAVENOUS PRN
OUTPATIENT
Start: 2023-11-09

## 2023-11-09 RX ORDER — EPINEPHRINE 1 MG/ML
0.3 INJECTION, SOLUTION, CONCENTRATE INTRAVENOUS PRN
OUTPATIENT
Start: 2023-11-09

## 2023-11-09 RX ORDER — DIPHENHYDRAMINE HYDROCHLORIDE 50 MG/ML
50 INJECTION INTRAMUSCULAR; INTRAVENOUS
OUTPATIENT
Start: 2023-11-09

## 2023-11-09 RX ORDER — SODIUM CHLORIDE 0.9 % (FLUSH) 0.9 %
5-40 SYRINGE (ML) INJECTION PRN
OUTPATIENT
Start: 2023-11-09

## 2023-11-09 RX ORDER — SODIUM CHLORIDE 9 MG/ML
INJECTION, SOLUTION INTRAVENOUS CONTINUOUS
OUTPATIENT
Start: 2023-11-09

## 2023-11-12 LAB — CYTOLOGY REPORT: NORMAL

## 2023-11-13 RX ORDER — OMEPRAZOLE 20 MG/1
20 CAPSULE, DELAYED RELEASE ORAL DAILY
Qty: 90 CAPSULE | Refills: 1 | Status: SHIPPED | OUTPATIENT
Start: 2023-11-13 | End: 2023-11-16

## 2023-11-15 ENCOUNTER — HOSPITAL ENCOUNTER (OUTPATIENT)
Dept: MAMMOGRAPHY | Age: 78
Discharge: HOME | End: 2023-11-17
Attending: OBSTETRICS & GYNECOLOGY
Payer: MEDICAID

## 2023-11-15 ENCOUNTER — HOSPITAL ENCOUNTER (OUTPATIENT)
Dept: ULTRASOUND IMAGING | Age: 78
Discharge: HOME | End: 2023-11-17
Attending: OBSTETRICS & GYNECOLOGY
Payer: MEDICAID

## 2023-11-15 ENCOUNTER — HOSPITAL ENCOUNTER (OUTPATIENT)
Dept: INFUSION THERAPY | Age: 78
Discharge: HOME | End: 2023-11-15
Payer: MEDICAID

## 2023-11-15 ENCOUNTER — TELEPHONE (OUTPATIENT)
Dept: OBGYN | Age: 78
End: 2023-11-15

## 2023-11-15 VITALS — BODY MASS INDEX: 28.53 KG/M2 | WEIGHT: 161 LBS | HEIGHT: 63 IN

## 2023-11-15 VITALS
DIASTOLIC BLOOD PRESSURE: 55 MMHG | TEMPERATURE: 97.3 F | RESPIRATION RATE: 16 BRPM | SYSTOLIC BLOOD PRESSURE: 104 MMHG | HEART RATE: 98 BPM | OXYGEN SATURATION: 98 %

## 2023-11-15 DIAGNOSIS — N64.4 BREAST PAIN: ICD-10-CM

## 2023-11-15 LAB
ALBUMIN SERPL-MCNC: 4 G/DL (ref 3.5–5.2)
ALBUMIN/GLOB SERPL: 1.4 {RATIO} (ref 1–2.5)
ALP SERPL-CCNC: 65 U/L (ref 35–104)
ALT SERPL-CCNC: 12 U/L (ref 5–33)
ANION GAP SERPL CALCULATED.3IONS-SCNC: 9 MMOL/L (ref 9–17)
AST SERPL-CCNC: 12 U/L
BILIRUB SERPL-MCNC: 0.2 MG/DL (ref 0.3–1.2)
BUN SERPL-MCNC: 26 MG/DL (ref 8–23)
BUN/CREAT SERPL: 20 (ref 9–20)
CALCIUM SERPL-MCNC: 8.9 MG/DL (ref 8.6–10.4)
CHLORIDE SERPL-SCNC: 103 MMOL/L (ref 98–107)
CO2 SERPL-SCNC: 27 MMOL/L (ref 20–31)
CREAT SERPL-MCNC: 1.3 MG/DL (ref 0.5–0.9)
GFR SERPL CREATININE-BSD FRML MDRD: 42 ML/MIN/1.73M2
GLUCOSE SERPL-MCNC: 158 MG/DL (ref 70–99)
POTASSIUM SERPL-SCNC: 4.5 MMOL/L (ref 3.7–5.3)
PROT SERPL-MCNC: 6.8 G/DL (ref 6.4–8.3)
SODIUM SERPL-SCNC: 139 MMOL/L (ref 135–144)

## 2023-11-15 PROCEDURE — 36415 COLL VENOUS BLD VENIPUNCTURE: CPT

## 2023-11-15 PROCEDURE — 76642 ULTRASOUND BREAST LIMITED: CPT

## 2023-11-15 PROCEDURE — G0279 TOMOSYNTHESIS, MAMMO: HCPCS

## 2023-11-15 PROCEDURE — 80053 COMPREHEN METABOLIC PANEL: CPT

## 2023-11-15 RX ORDER — EPINEPHRINE 1 MG/ML
0.3 INJECTION, SOLUTION, CONCENTRATE INTRAVENOUS PRN
OUTPATIENT
Start: 2024-11-10

## 2023-11-15 RX ORDER — ESCITALOPRAM OXALATE 20 MG/1
30 TABLET ORAL DAILY
Qty: 90 TABLET | Refills: 3 | Status: CANCELLED | OUTPATIENT
Start: 2023-11-15

## 2023-11-15 RX ORDER — SODIUM CHLORIDE 9 MG/ML
5-250 INJECTION, SOLUTION INTRAVENOUS PRN
OUTPATIENT
Start: 2024-11-10

## 2023-11-15 RX ORDER — DIPHENHYDRAMINE HYDROCHLORIDE 50 MG/ML
50 INJECTION INTRAMUSCULAR; INTRAVENOUS
OUTPATIENT
Start: 2024-11-10

## 2023-11-15 RX ORDER — SODIUM CHLORIDE 0.9 % (FLUSH) 0.9 %
5-40 SYRINGE (ML) INJECTION PRN
OUTPATIENT
Start: 2024-11-10

## 2023-11-15 RX ORDER — ZOLEDRONIC ACID 5 MG/100ML
5 INJECTION, SOLUTION INTRAVENOUS ONCE
OUTPATIENT
Start: 2024-11-10 | End: 2024-11-10

## 2023-11-15 RX ORDER — SODIUM CHLORIDE 9 MG/ML
INJECTION, SOLUTION INTRAVENOUS CONTINUOUS
OUTPATIENT
Start: 2024-11-10

## 2023-11-15 NOTE — PROGRESS NOTES
Spoke with Rissa Germain NP on call for Dr. Ced Rubio. Informed her that the cutoff for creatinine clearance is 35 and pt is 34 today. She would prefer to have pt come back in 2 to 4 weeks to recheck metabolic panel and reassess creat clearance to give safely according to guidelines. Pt discharged to home in stable condition. Pts son states she is going out of town for 3 or 4 months. He will call Dr. Leonardo Roberto office tomorrow and ask what the options are.   Pt's son will call up back in needed to schedule when she is back in [facility]

## 2023-11-15 NOTE — TELEPHONE ENCOUNTER
Patient came to window c/o burning with urination as well as having some vaginal itching. Patient is asking to have something prescribed before [place] leaves the [place].

## 2023-11-16 ENCOUNTER — TELEPHONE (OUTPATIENT)
Dept: INTERNAL MEDICINE | Age: 78
End: 2023-11-16

## 2023-11-16 RX ORDER — LORATADINE 10 MG/1
10 TABLET ORAL NIGHTLY
Qty: 90 TABLET | Refills: 3 | Status: SHIPPED | OUTPATIENT
Start: 2023-11-16

## 2023-11-16 RX ORDER — PANTOPRAZOLE SODIUM 40 MG/1
40 TABLET, DELAYED RELEASE ORAL
Qty: 90 TABLET | Refills: 5 | Status: SHIPPED | OUTPATIENT
Start: 2023-11-16

## 2023-11-16 RX ORDER — ESCITALOPRAM OXALATE 20 MG/1
30 TABLET ORAL DAILY
Qty: 135 TABLET | Refills: 3 | Status: SHIPPED | OUTPATIENT
Start: 2023-11-16

## 2023-11-16 RX ORDER — CLOTRIMAZOLE AND BETAMETHASONE DIPROPIONATE 10; .64 MG/G; MG/G
CREAM TOPICAL
Qty: 45 G | Refills: 3 | Status: SHIPPED | OUTPATIENT
Start: 2023-11-16

## 2023-11-16 RX ORDER — ALENDRONATE SODIUM 70 MG/1
70 TABLET ORAL
Qty: 12 TABLET | Refills: 3 | Status: SHIPPED | OUTPATIENT
Start: 2023-11-16

## 2023-11-16 NOTE — TELEPHONE ENCOUNTER
Alyson Botello MD Physician Signed     10:21 AM     Copy     I believe this was regarding [place], NOT [facility], can we just make sure. Can we call her and let her know that her kidney function was slightly off, but it it is slightly worse than before, this then can change day by day, we can keep an eye on that, but she might want to avoid taking any ibuprofen/Aleve, as they can cause kidney damage. Can we also call the infusion center, because as far as I know Reclast can be given for anyone with GFR more than 35, but can we check on their policy        Proper-Jihan Roe, [address] [facility] Assistant Signed     Yesterday     Copy     Patient was supposed to get a injection for her bones but they would not give it to her because her kidney function and her  is requesting a call concerning this she has not seen you since 2022 but wanted to know if there was a pill she could take.

## 2023-11-16 NOTE — TELEPHONE ENCOUNTER
I discussed with her son, who says that his mom is leaving the country, so they would not be time to recheck blood work and perform another infusion. He says that she can start pills for osteoporosis instead. Therefore, will cancel Reclast, and I will prescribe her Fosamax weekly. I counseled regarding side effects, and told her son that she needs to drink it with a lot of water, needs to stay upright after she takes it for half an hour. I also refilled her other medications.

## 2023-11-17 DIAGNOSIS — R92.8 ABNORMAL MAMMOGRAM OF LEFT BREAST: Primary | ICD-10-CM

## 2023-11-27 NOTE — PROGRESS NOTES
Vitamin D 25 Hydroxy   4. Prediabetes           No follow-ups on file. Patient given educational materials - see patient instructions. Discussed use, benefit, and side effects of prescribed medications. All patient questions answered. Pt voiced understanding. Reviewed health maintenance. Electronically signed Ada Celaya MD on 4/28/2020 at 8:41 AM      This note has been created using the Epic electronic health record, and dictated in part by [email or web address] [facility] dictation system. Despite the documenting physician's best efforts, there may be errors in spelling, grammar or syntax.
Clemencia Whatley

## 2024-01-02 RX ORDER — BACLOFEN 5 MG/1
5 TABLET ORAL 2 TIMES DAILY PRN
Qty: 20 TABLET | Refills: 2 | Status: SHIPPED | OUTPATIENT
Start: 2024-01-02

## 2024-01-23 RX ORDER — OMEPRAZOLE 40 MG/1
40 CAPSULE, DELAYED RELEASE ORAL DAILY
Qty: 90 CAPSULE | Refills: 1 | OUTPATIENT
Start: 2024-01-23

## 2024-02-15 RX ORDER — SOFT LENS RINSE,STORE SOLUTION
SOLUTION, NON-ORAL MISCELLANEOUS
Qty: 118 ML | Refills: 1 | Status: SHIPPED | OUTPATIENT
Start: 2024-02-15

## 2024-04-17 ENCOUNTER — TELEPHONE (OUTPATIENT)
Dept: INTERNAL MEDICINE | Age: 79
End: 2024-04-17

## 2024-04-17 RX ORDER — DIAPER,BRIEF,INFANT-TODD,DISP
EACH MISCELLANEOUS 2 TIMES DAILY
Qty: 30 G | Refills: 0 | Status: SHIPPED | OUTPATIENT
Start: 2024-04-17

## 2024-04-17 NOTE — TELEPHONE ENCOUNTER
Spoke with Timmy- pt has an external hemorrhoid- a little bleeding, painful, and itching. Pt has tried Prep-H, and another OTC that he can't recall the name.  Pt uses [facility].

## 2024-04-17 NOTE — TELEPHONE ENCOUNTER
Timmy called in stating that patient has a hemorrhoid and needs a prescription for this. Timmy reports patient has gotten something in the past but he is unsure what. If agreeable he would like something sent into [facility]. Please advise.     Patient is unable to wait.

## 2024-04-22 DIAGNOSIS — M17.0 OSTEOARTHRITIS OF BOTH KNEES, UNSPECIFIED OSTEOARTHRITIS TYPE: Primary | ICD-10-CM

## 2024-04-30 ENCOUNTER — OFFICE VISIT (OUTPATIENT)
Dept: ORTHOPEDIC SURGERY | Age: 79
End: 2024-04-30
Payer: MEDICAID

## 2024-04-30 ENCOUNTER — HOSPITAL ENCOUNTER (OUTPATIENT)
Dept: GENERAL RADIOLOGY | Age: 79
Discharge: HOME | End: 2024-05-02
Payer: MEDICAID

## 2024-04-30 ENCOUNTER — TELEPHONE (OUTPATIENT)
Dept: INTERNAL MEDICINE | Age: 79
End: 2024-04-30

## 2024-04-30 VITALS
OXYGEN SATURATION: 98 % | BODY MASS INDEX: 28.53 KG/M2 | SYSTOLIC BLOOD PRESSURE: 100 MMHG | HEART RATE: 60 BPM | DIASTOLIC BLOOD PRESSURE: 60 MMHG | HEIGHT: 63 IN | WEIGHT: 161 LBS

## 2024-04-30 DIAGNOSIS — M17.0 OSTEOARTHRITIS OF BOTH KNEES, UNSPECIFIED OSTEOARTHRITIS TYPE: ICD-10-CM

## 2024-04-30 DIAGNOSIS — M65.321 TRIGGER FINGER, RIGHT INDEX FINGER: ICD-10-CM

## 2024-04-30 DIAGNOSIS — M17.0 OSTEOARTHRITIS OF BOTH KNEES, UNSPECIFIED OSTEOARTHRITIS TYPE: Primary | ICD-10-CM

## 2024-04-30 PROCEDURE — PBSHW PBB SHADOW CHARGE: Performed by: NURSE PRACTITIONER

## 2024-04-30 PROCEDURE — 1123F ACP DISCUSS/DSCN MKR DOCD: CPT | Performed by: NURSE PRACTITIONER

## 2024-04-30 PROCEDURE — G8417 CALC BMI ABV UP PARAM F/U: HCPCS | Performed by: NURSE PRACTITIONER

## 2024-04-30 PROCEDURE — 1090F PRES/ABSN URINE INCON ASSESS: CPT | Performed by: NURSE PRACTITIONER

## 2024-04-30 PROCEDURE — 73562 X-RAY EXAM OF KNEE 3: CPT

## 2024-04-30 PROCEDURE — 20610 DRAIN/INJ JOINT/BURSA W/O US: CPT | Performed by: NURSE PRACTITIONER

## 2024-04-30 PROCEDURE — 3078F DIAST BP <80 MM HG: CPT | Performed by: NURSE PRACTITIONER

## 2024-04-30 PROCEDURE — 20550 NJX 1 TENDON SHEATH/LIGAMENT: CPT | Performed by: NURSE PRACTITIONER

## 2024-04-30 PROCEDURE — G8399 PT W/DXA RESULTS DOCUMENT: HCPCS | Performed by: NURSE PRACTITIONER

## 2024-04-30 PROCEDURE — 99214 OFFICE O/P EST MOD 30 MIN: CPT | Performed by: NURSE PRACTITIONER

## 2024-04-30 PROCEDURE — 3074F SYST BP LT 130 MM HG: CPT | Performed by: NURSE PRACTITIONER

## 2024-04-30 PROCEDURE — 1036F TOBACCO NON-USER: CPT | Performed by: NURSE PRACTITIONER

## 2024-04-30 PROCEDURE — G8427 DOCREV CUR MEDS BY ELIG CLIN: HCPCS | Performed by: NURSE PRACTITIONER

## 2024-04-30 PROCEDURE — 99213 OFFICE O/P EST LOW 20 MIN: CPT | Performed by: NURSE PRACTITIONER

## 2024-04-30 RX ORDER — TRIAMCINOLONE ACETONIDE 40 MG/ML
20 INJECTION, SUSPENSION INTRA-ARTICULAR; INTRAMUSCULAR ONCE
Status: COMPLETED | OUTPATIENT
Start: 2024-04-30 | End: 2024-04-30

## 2024-04-30 RX ORDER — HYALURONATE SODIUM 10 MG/ML
20 SYRINGE (ML) INTRAARTICULAR ONCE
Status: COMPLETED | OUTPATIENT
Start: 2024-04-30 | End: 2024-04-30

## 2024-04-30 RX ORDER — CELECOXIB 100 MG/1
100 CAPSULE ORAL 2 TIMES DAILY
Qty: 60 CAPSULE | Refills: 2 | Status: SHIPPED | OUTPATIENT
Start: 2024-04-30

## 2024-04-30 RX ORDER — PROPRANOLOL HYDROCHLORIDE 80 MG/1
80 CAPSULE, EXTENDED RELEASE ORAL DAILY
Qty: 90 CAPSULE | Refills: 1 | Status: SHIPPED | OUTPATIENT
Start: 2024-04-30

## 2024-04-30 RX ORDER — BUPIVACAINE HYDROCHLORIDE 5 MG/ML
0.5 INJECTION, SOLUTION PERINEURAL ONCE
Status: COMPLETED | OUTPATIENT
Start: 2024-04-30 | End: 2024-04-30

## 2024-04-30 RX ADMIN — BUPIVACAINE HYDROCHLORIDE 2.5 MG: 5 INJECTION, SOLUTION PERINEURAL at 08:43

## 2024-04-30 RX ADMIN — Medication 20 MG: at 08:42

## 2024-04-30 RX ADMIN — TRIAMCINOLONE ACETONIDE 20 MG: 200 INJECTION, SUSPENSION INTRA-ARTICULAR; INTRAMUSCULAR at 08:43

## 2024-04-30 NOTE — TELEPHONE ENCOUNTER
Timmy stopped by and states Mom's hands are still shaking and requesting to increase medication. Please advise and call back @[phone]

## 2024-04-30 NOTE — TELEPHONE ENCOUNTER
Please call him and let him know that I sent propranolol 80 mg, which is higher than her 60 mg to the pharmacy.

## 2024-04-30 NOTE — PROGRESS NOTES
APONEUROSIS    BUPivacaine (MARCAINE) 0.5 % injection 2.5 mg    triamcinolone acetonide (KENALOG-40) injection 20 mg            PLAN:  Plan at this time patient would like to proceed with bilateral knee Euflexxa injections.  Will see her back next week for her second injection.  She would also like to proceed with a right index finger trigger finger corticosteroid injection.        Procedures    FL ARTHROCENTESIS ASPIR&/INJ MAJOR JT/BURSA W/O US    FL INJECTION 1 TENDON SHEATH/LIGAMENT APONEUROSIS        Procedure:    PROCEDURE NOTE:      After verbal consent was obtained, the bilateral knees were prepped in sterile fashion with alcohol. A 22-gauge needle was introduced into the bilateral inferolateral portals of the knee and 5 mL of 0.5% Marcaine and 80 mg of Kenalog were injected. Hemostasis achieved.  Dry sterile bandage applied.  The patient tolerated procedure well.     Activities as tolerated. WBAT.     The right index finger was prepped in sterile fashion with alcohol.  A 25-gauge needle was introduced into the right index finger A1 pulley with half an mL of half percent Marcaine and 20 mg of Kenalog were injected.  Hemostasis achieved.  Dry sterile bandage applied.  Patient tolerated procedure well.    Electronically signed by BRITTANY Maurer CNP on 4/30/2024 at 9:01 AM

## 2024-05-01 ENCOUNTER — TELEPHONE (OUTPATIENT)
Dept: INTERNAL MEDICINE | Age: 79
End: 2024-05-01

## 2024-05-01 NOTE — TELEPHONE ENCOUNTER
Received call from son (Timmy [phone])- he picked up the Propranolol script, and the pharmacist told him it was also a BP med.  Is it ok she take it in the am with her other BP meds, or will that be too much?

## 2024-05-02 NOTE — TELEPHONE ENCOUNTER
If she has been taking the lower dose in the morning, then she can probably take this in the morning as well

## 2024-05-06 RX ORDER — OMEPRAZOLE 20 MG/1
20 CAPSULE, DELAYED RELEASE ORAL DAILY
Qty: 90 CAPSULE | Refills: 1 | Status: SHIPPED | OUTPATIENT
Start: 2024-05-06

## 2024-05-07 ENCOUNTER — OFFICE VISIT (OUTPATIENT)
Dept: ORTHOPEDIC SURGERY | Age: 79
End: 2024-05-07
Payer: MEDICAID

## 2024-05-07 ENCOUNTER — HOSPITAL ENCOUNTER (OUTPATIENT)
Age: 79
Discharge: HOME | End: 2024-05-07
Payer: MEDICAID

## 2024-05-07 ENCOUNTER — OFFICE VISIT (OUTPATIENT)
Dept: INTERNAL MEDICINE | Age: 79
End: 2024-05-07
Payer: MEDICAID

## 2024-05-07 VITALS
SYSTOLIC BLOOD PRESSURE: 90 MMHG | DIASTOLIC BLOOD PRESSURE: 60 MMHG | WEIGHT: 161 LBS | HEIGHT: 63 IN | BODY MASS INDEX: 28.53 KG/M2 | HEART RATE: 70 BPM | RESPIRATION RATE: 16 BRPM | OXYGEN SATURATION: 98 %

## 2024-05-07 VITALS
DIASTOLIC BLOOD PRESSURE: 75 MMHG | HEART RATE: 69 BPM | HEIGHT: 72 IN | SYSTOLIC BLOOD PRESSURE: 110 MMHG | BODY MASS INDEX: 21.81 KG/M2 | WEIGHT: 161 LBS

## 2024-05-07 DIAGNOSIS — R73.01 IFG (IMPAIRED FASTING GLUCOSE): Primary | ICD-10-CM

## 2024-05-07 DIAGNOSIS — R30.0 DYSURIA: ICD-10-CM

## 2024-05-07 DIAGNOSIS — R73.01 IFG (IMPAIRED FASTING GLUCOSE): ICD-10-CM

## 2024-05-07 DIAGNOSIS — I10 HYPERTENSION, UNSPECIFIED TYPE: ICD-10-CM

## 2024-05-07 DIAGNOSIS — M25.512 LEFT SHOULDER PAIN, UNSPECIFIED CHRONICITY: ICD-10-CM

## 2024-05-07 DIAGNOSIS — M17.0 OSTEOARTHRITIS OF BOTH KNEES, UNSPECIFIED OSTEOARTHRITIS TYPE: Primary | ICD-10-CM

## 2024-05-07 LAB
ALBUMIN SERPL-MCNC: 4.4 G/DL (ref 3.5–5.2)
ALBUMIN/GLOB SERPL: 1.6 {RATIO} (ref 1–2.5)
ALP SERPL-CCNC: 53 U/L (ref 35–104)
ALT SERPL-CCNC: 11 U/L (ref 5–33)
ANION GAP SERPL CALCULATED.3IONS-SCNC: 10 MMOL/L (ref 9–17)
AST SERPL-CCNC: 12 U/L
BASOPHILS # BLD: 0.07 K/UL (ref 0–0.2)
BASOPHILS NFR BLD: 1 % (ref 0–2)
BILIRUB SERPL-MCNC: 0.2 MG/DL (ref 0.3–1.2)
BILIRUB UR QL STRIP: NEGATIVE
BUN SERPL-MCNC: 34 MG/DL (ref 8–23)
BUN/CREAT SERPL: 26 (ref 9–20)
CALCIUM SERPL-MCNC: 8.8 MG/DL (ref 8.6–10.4)
CHLORIDE SERPL-SCNC: 101 MMOL/L (ref 98–107)
CLARITY UR: CLEAR
CO2 SERPL-SCNC: 23 MMOL/L (ref 20–31)
COLOR UR: YELLOW
COMMENT: NORMAL
CREAT SERPL-MCNC: 1.3 MG/DL (ref 0.5–0.9)
EOSINOPHIL # BLD: 0.31 K/UL (ref 0–0.44)
EOSINOPHILS RELATIVE PERCENT: 3 % (ref 1–4)
ERYTHROCYTE [DISTWIDTH] IN BLOOD BY AUTOMATED COUNT: 15.5 % (ref 11.8–14.4)
EST. AVERAGE GLUCOSE BLD GHB EST-MCNC: 134 MG/DL
GFR, ESTIMATED: 42 ML/MIN/1.73M2
GLUCOSE SERPL-MCNC: 254 MG/DL (ref 70–99)
GLUCOSE UR STRIP-MCNC: NEGATIVE MG/DL
HBA1C MFR BLD: 6.3 % (ref 4–6)
HCT VFR BLD AUTO: 35.6 % (ref 36.3–47.1)
HGB BLD-MCNC: 11.8 G/DL (ref 11.9–15.1)
HGB UR QL STRIP.AUTO: NEGATIVE
IMM GRANULOCYTES # BLD AUTO: 0.05 K/UL (ref 0–0.3)
IMM GRANULOCYTES NFR BLD: 1 %
KETONES UR STRIP-MCNC: NEGATIVE MG/DL
LEUKOCYTE ESTERASE UR QL STRIP: NEGATIVE
LYMPHOCYTES NFR BLD: 1.51 K/UL (ref 1.1–3.7)
LYMPHOCYTES RELATIVE PERCENT: 15 % (ref 24–43)
MCH RBC QN AUTO: 29.3 PG (ref 25.2–33.5)
MCHC RBC AUTO-ENTMCNC: 33.1 G/DL (ref 25.2–33.5)
MCV RBC AUTO: 88.3 FL (ref 82.6–102.9)
MONOCYTES NFR BLD: 0.34 K/UL (ref 0.1–1.2)
MONOCYTES NFR BLD: 3 % (ref 3–12)
NEUTROPHILS NFR BLD: 77 % (ref 36–65)
NEUTS SEG NFR BLD: 7.78 K/UL (ref 1.5–8.1)
NITRITE UR QL STRIP: NEGATIVE
NRBC BLD-RTO: 0 PER 100 WBC
PH UR STRIP: 6 [PH] (ref 5–6)
PLATELET # BLD AUTO: 297 K/UL (ref 138–453)
PMV BLD AUTO: 11.3 FL (ref 8.1–13.5)
POTASSIUM SERPL-SCNC: 5.3 MMOL/L (ref 3.7–5.3)
PROT SERPL-MCNC: 7.1 G/DL (ref 6.4–8.3)
PROT UR STRIP-MCNC: NEGATIVE MG/DL
RBC # BLD AUTO: 4.03 M/UL (ref 3.95–5.11)
RBC # BLD: ABNORMAL 10*6/UL
SODIUM SERPL-SCNC: 134 MMOL/L (ref 135–144)
SP GR UR STRIP: 1.02 (ref 1.01–1.02)
UROBILINOGEN UR STRIP-ACNC: NORMAL EU/DL (ref 0–1)
WBC OTHER # BLD: 10.1 K/UL (ref 3.5–11.3)

## 2024-05-07 PROCEDURE — G8417 CALC BMI ABV UP PARAM F/U: HCPCS | Performed by: INTERNAL MEDICINE

## 2024-05-07 PROCEDURE — G8399 PT W/DXA RESULTS DOCUMENT: HCPCS | Performed by: INTERNAL MEDICINE

## 2024-05-07 PROCEDURE — 99214 OFFICE O/P EST MOD 30 MIN: CPT | Performed by: INTERNAL MEDICINE

## 2024-05-07 PROCEDURE — 1036F TOBACCO NON-USER: CPT | Performed by: NURSE PRACTITIONER

## 2024-05-07 PROCEDURE — 1036F TOBACCO NON-USER: CPT | Performed by: INTERNAL MEDICINE

## 2024-05-07 PROCEDURE — 36415 COLL VENOUS BLD VENIPUNCTURE: CPT

## 2024-05-07 PROCEDURE — G8427 DOCREV CUR MEDS BY ELIG CLIN: HCPCS | Performed by: INTERNAL MEDICINE

## 2024-05-07 PROCEDURE — 3074F SYST BP LT 130 MM HG: CPT | Performed by: INTERNAL MEDICINE

## 2024-05-07 PROCEDURE — 1090F PRES/ABSN URINE INCON ASSESS: CPT | Performed by: INTERNAL MEDICINE

## 2024-05-07 PROCEDURE — G8420 CALC BMI NORM PARAMETERS: HCPCS | Performed by: NURSE PRACTITIONER

## 2024-05-07 PROCEDURE — 85025 COMPLETE CBC W/AUTO DIFF WBC: CPT

## 2024-05-07 PROCEDURE — 83036 HEMOGLOBIN GLYCOSYLATED A1C: CPT

## 2024-05-07 PROCEDURE — 80053 COMPREHEN METABOLIC PANEL: CPT

## 2024-05-07 PROCEDURE — 81003 URINALYSIS AUTO W/O SCOPE: CPT

## 2024-05-07 PROCEDURE — 3078F DIAST BP <80 MM HG: CPT | Performed by: INTERNAL MEDICINE

## 2024-05-07 PROCEDURE — 1090F PRES/ABSN URINE INCON ASSESS: CPT | Performed by: NURSE PRACTITIONER

## 2024-05-07 PROCEDURE — 99212 OFFICE O/P EST SF 10 MIN: CPT | Performed by: INTERNAL MEDICINE

## 2024-05-07 PROCEDURE — G2211 COMPLEX E/M VISIT ADD ON: HCPCS | Performed by: INTERNAL MEDICINE

## 2024-05-07 PROCEDURE — 20610 DRAIN/INJ JOINT/BURSA W/O US: CPT | Performed by: NURSE PRACTITIONER

## 2024-05-07 PROCEDURE — 1123F ACP DISCUSS/DSCN MKR DOCD: CPT | Performed by: INTERNAL MEDICINE

## 2024-05-07 PROCEDURE — 99214 OFFICE O/P EST MOD 30 MIN: CPT | Performed by: ORTHOPAEDIC SURGERY

## 2024-05-07 PROCEDURE — G8399 PT W/DXA RESULTS DOCUMENT: HCPCS | Performed by: NURSE PRACTITIONER

## 2024-05-07 PROCEDURE — G8427 DOCREV CUR MEDS BY ELIG CLIN: HCPCS | Performed by: NURSE PRACTITIONER

## 2024-05-07 RX ORDER — TRIAMCINOLONE ACETONIDE 40 MG/ML
40 INJECTION, SUSPENSION INTRA-ARTICULAR; INTRAMUSCULAR ONCE
Status: SHIPPED | OUTPATIENT
Start: 2024-05-07

## 2024-05-07 RX ORDER — HYALURONATE SODIUM 10 MG/ML
20 SYRINGE (ML) INTRAARTICULAR ONCE
Status: SHIPPED | OUTPATIENT
Start: 2024-05-07

## 2024-05-07 RX ORDER — BUPIVACAINE HYDROCHLORIDE 5 MG/ML
3 INJECTION, SOLUTION PERINEURAL ONCE
Status: SHIPPED | OUTPATIENT
Start: 2024-05-07

## 2024-05-07 RX ORDER — CEFUROXIME AXETIL 250 MG/1
250 TABLET ORAL 2 TIMES DAILY
Qty: 14 TABLET | Refills: 0 | Status: SHIPPED | OUTPATIENT
Start: 2024-05-07 | End: 2024-05-14

## 2024-05-07 ASSESSMENT — PATIENT HEALTH QUESTIONNAIRE - PHQ9
4. FEELING TIRED OR HAVING LITTLE ENERGY: NOT AT ALL
2. FEELING DOWN, DEPRESSED OR HOPELESS: NOT AT ALL
5. POOR APPETITE OR OVEREATING: NOT AT ALL
3. TROUBLE FALLING OR STAYING ASLEEP: NOT AT ALL
SUM OF ALL RESPONSES TO PHQ QUESTIONS 1-9: 0
9. THOUGHTS THAT YOU WOULD BE BETTER OFF DEAD, OR OF HURTING YOURSELF: NOT AT ALL
6. FEELING BAD ABOUT YOURSELF - OR THAT YOU ARE A FAILURE OR HAVE LET YOURSELF OR YOUR FAMILY DOWN: NOT AT ALL
8. MOVING OR SPEAKING SO SLOWLY THAT OTHER PEOPLE COULD HAVE NOTICED. OR THE OPPOSITE, BEING SO FIGETY OR RESTLESS THAT YOU HAVE BEEN MOVING AROUND A LOT MORE THAN USUAL: NOT AT ALL
SUM OF ALL RESPONSES TO PHQ QUESTIONS 1-9: 0
10. IF YOU CHECKED OFF ANY PROBLEMS, HOW DIFFICULT HAVE THESE PROBLEMS MADE IT FOR YOU TO DO YOUR WORK, TAKE CARE OF THINGS AT HOME, OR GET ALONG WITH OTHER PEOPLE: NOT DIFFICULT AT ALL
SUM OF ALL RESPONSES TO PHQ QUESTIONS 1-9: 0
DEPRESSION UNABLE TO ASSESS: FUNCTIONAL CAPACITY MOTIVATION LIMITS ACCURACY
1. LITTLE INTEREST OR PLEASURE IN DOING THINGS: NOT AT ALL
7. TROUBLE CONCENTRATING ON THINGS, SUCH AS READING THE NEWSPAPER OR WATCHING TELEVISION: NOT AT ALL
SUM OF ALL RESPONSES TO PHQ QUESTIONS 1-9: 0
SUM OF ALL RESPONSES TO PHQ9 QUESTIONS 1 & 2: 0

## 2024-05-07 NOTE — PROGRESS NOTES
1    hydrocortisone 1 % ointment Apply topically 2 times daily 30 g 0    Soft Lens Products (BIOTRUE) SOLN Use as directed 118 mL 1    Baclofen (LIORESAL) 5 MG tablet Take 1 tablet by mouth 2 times daily as needed (pain) (Patient not taking: Reported on 4/30/2024) 20 tablet 2    loratadine (CLARITIN) 10 MG tablet Take 1 tablet by mouth nightly 90 tablet 3    pantoprazole (PROTONIX) 40 MG tablet Take 1 tablet by mouth every morning (before breakfast) 90 tablet 5    clotrimazole-betamethasone (LOTRISONE) 1-0.05 % cream Apply topically 2 times daily. (Patient not taking: Reported on 4/30/2024) 45 g 3    escitalopram (LEXAPRO) 20 MG tablet Take 1.5 tablets by mouth daily 135 tablet 3    alendronate (FOSAMAX) 70 MG tablet Take 1 tablet by mouth every 7 days 12 tablet 3    losartan (COZAAR) 50 MG tablet Take 1 tablet by mouth daily 90 tablet 3    celecoxib (CELEBREX) 100 MG capsule Take 1 capsule by mouth 2 times daily (Patient not taking: Reported on 11/8/2023) 20 capsule 2    ONETOUCH VERIO strip USE AS DIRECTED twice a day 100 strip 3    Blood Glucose Monitoring Suppl (ONETOUCH VERIO FLEX SYSTEM) w/Device KIT USE AS DIRECTED twice a day      Lancets (ONETOUCH DELICA PLUS [id number]) MISC USE AS DIRECTED twice a day      fluticasone (FLONASE) 50 MCG/ACT nasal spray 1 spray by Each Nostril route daily (Patient not taking: Reported on 4/30/2024) 3 each 5    ondansetron (ZOFRAN) 4 MG tablet Take 1 tablet by mouth every 8 hours as needed for Nausea or Vomiting (Patient not taking: Reported on 11/8/2023) 30 tablet 1    blood glucose monitor kit and supplies Test 2 times a day   Onetouch 1 kit 0    hydroCHLOROthiazide (MICROZIDE) 12.5 MG capsule Take 1 capsule by mouth every morning 90 capsule 3    levothyroxine (EUTHYROX) 88 MCG tablet Take 1 tablet by mouth Daily 90 tablet 3    metFORMIN (GLUCOPHAGE) 500 MG tablet Take 1 tablet by mouth 2 times daily (with meals) 180 tablet 3    triamterene-hydroCHLOROthiazide (MAXZIDE-25)

## 2024-05-07 NOTE — PROGRESS NOTES
mouth 2 times daily (with meals), Disp: 180 tablet, Rfl: 3    meclizine (ANTIVERT) 25 MG tablet, Take 1 tablet by mouth 3 times daily as needed for Dizziness, Disp: 90 tablet, Rfl: 3    Baclofen (LIORESAL) 5 MG tablet, Take 1 tablet by mouth 2 times daily as needed (pain) (Patient not taking: Reported on 4/30/2024), Disp: 20 tablet, Rfl: 2    clotrimazole-betamethasone (LOTRISONE) 1-0.05 % cream, Apply topically 2 times daily. (Patient not taking: Reported on 4/30/2024), Disp: 45 g, Rfl: 3    celecoxib (CELEBREX) 100 MG capsule, Take 1 capsule by mouth 2 times daily (Patient not taking: Reported on 11/8/2023), Disp: 20 capsule, Rfl: 2    fluticasone (FLONASE) 50 MCG/ACT nasal spray, 1 spray by Each Nostril route daily (Patient not taking: Reported on 4/30/2024), Disp: 3 each, Rfl: 5    ondansetron (ZOFRAN) 4 MG tablet, Take 1 tablet by mouth every 8 hours as needed for Nausea or Vomiting (Patient not taking: Reported on 11/8/2023), Disp: 30 tablet, Rfl: 1    triamterene-hydroCHLOROthiazide (MAXZIDE-25) 37.5-25 MG per tablet, Take 1 tablet by mouth daily (Patient not taking: Reported on 11/8/2023), Disp: 90 tablet, Rfl: 3    aspirin 325 MG tablet, Take 1 tablet by mouth daily (Patient not taking: Reported on 9/20/2023), Disp: , Rfl:     clindamycin (CLEOCIN) 2 % vaginal cream, Place vaginally nightly for 14 nights (Patient not taking: Reported on 11/8/2023), Disp: 2 Tube, Rfl: 0    primidone (MYSOLINE) 50 MG tablet, take 1/2 tablet by mouth at bedtime for TREMORS MAY INCREASE TO 1 tablet at bedtime if needed (Patient not taking: Reported on 11/8/2023), Disp: 30 tablet, Rfl: 1    triamcinolone (KENALOG) 0.1 % cream, Apply topically 2 times daily. (Patient not taking: Reported on 11/8/2023), Disp: 30 g, Rfl: 0    dimenhyDRINATE (DRAMAMINE) 50 MG tablet, Take 1 tablet by mouth nightly as needed (Patient not taking: Reported on 11/8/2023), Disp: , Rfl:     Allergies  Patient has no known allergies.    Past Medical

## 2024-05-14 ENCOUNTER — OFFICE VISIT (OUTPATIENT)
Dept: ORTHOPEDIC SURGERY | Age: 79
End: 2024-05-14
Payer: MEDICAID

## 2024-05-14 ENCOUNTER — HOSPITAL ENCOUNTER (OUTPATIENT)
Dept: GENERAL RADIOLOGY | Age: 79
Discharge: HOME | End: 2024-05-16
Payer: MEDICAID

## 2024-05-14 VITALS
HEART RATE: 67 BPM | BODY MASS INDEX: 28.53 KG/M2 | OXYGEN SATURATION: 97 % | WEIGHT: 161 LBS | SYSTOLIC BLOOD PRESSURE: 120 MMHG | RESPIRATION RATE: 14 BRPM | DIASTOLIC BLOOD PRESSURE: 76 MMHG | HEIGHT: 63 IN

## 2024-05-14 DIAGNOSIS — M25.512 LEFT SHOULDER PAIN, UNSPECIFIED CHRONICITY: ICD-10-CM

## 2024-05-14 DIAGNOSIS — M17.0 OSTEOARTHRITIS OF BOTH KNEES, UNSPECIFIED OSTEOARTHRITIS TYPE: Primary | ICD-10-CM

## 2024-05-14 DIAGNOSIS — M25.512 LEFT SHOULDER PAIN, UNSPECIFIED CHRONICITY: Primary | ICD-10-CM

## 2024-05-14 PROCEDURE — 99214 OFFICE O/P EST MOD 30 MIN: CPT | Performed by: NURSE PRACTITIONER

## 2024-05-14 PROCEDURE — 73030 X-RAY EXAM OF SHOULDER: CPT

## 2024-05-14 PROCEDURE — 20610 DRAIN/INJ JOINT/BURSA W/O US: CPT | Performed by: NURSE PRACTITIONER

## 2024-05-14 RX ORDER — AZELASTINE HYDROCHLORIDE 137 UG/1
SPRAY, METERED NASAL
COMMUNITY
Start: 2024-04-06

## 2024-05-14 RX ORDER — HYALURONATE SODIUM 10 MG/ML
20 SYRINGE (ML) INTRAARTICULAR ONCE
Status: COMPLETED | OUTPATIENT
Start: 2024-05-14 | End: 2024-05-14

## 2024-05-14 RX ADMIN — Medication 20 MG: at 13:26

## 2024-05-14 RX ADMIN — Medication 20 MG: at 13:24

## 2024-05-14 NOTE — PROGRESS NOTES
Orthopaedic Progress Note      CHIEF COMPLAINT: Bilateral knee pain, left shoulder pain    HISTORY OF PRESENT ILLNESS:       Ms. Jimenez  is a 79 y.o. female  who presents with bilateral knee pain.  Patient is here today for her third Euflexxa injection.  She has done well with the previous 2 injections.  She notes her pain to be improved.  She is walking better.  She did have a left shoulder corticosteroid injection last visit.  She notes this to not be much improved with her pain.  She does have increased pain with range of motion.  Patient's son is here with her to help translate.      Past Medical History:    Past Medical History:   Diagnosis Date    Chronic kidney disease     family denies    Diabetes mellitus (HCC)     borderline diabetic    HTN (hypertension)     Osteoporosis        Past Surgical History:    Past Surgical History:   Procedure Laterality Date    COLONOSCOPY N/A 09/20/2023    with polypectomy and snare/cold biopsy, x 3 small adenomatous polyps, x 1 lipoma, sigmoid diverticulois, hemorrhoids - performed by Manny Arana MD at [facility] OR    EXTERNAL EAR SURGERY      20 years ago    UPPER GASTROINTESTINAL ENDOSCOPY N/A 09/20/2023    EGD Biopsy mild chronic gastritis, small hiatal hernia - performed by Manny Arana MD at [facility] OR         Current  Medications:  Current Outpatient Medications   Medication Sig Dispense Refill    Azelastine HCl 137 MCG/SPRAY SOLN instill 1 to 2 sprays into each nostril twice a day      cefUROXime (CEFTIN) 250 MG tablet Take 1 tablet by mouth 2 times daily for 7 days 14 tablet 0    omeprazole (PRILOSEC) 20 MG delayed release capsule take 1 capsule by mouth once daily 90 capsule 1    celecoxib (CELEBREX) 100 MG capsule Take 1 capsule by mouth 2 times daily 60 capsule 2    propranolol (INDERAL LA) 80 MG extended release capsule Take 1 capsule by mouth daily 90 capsule 1    hydrocortisone 1 % ointment Apply topically 2 times daily 30 g 0    Soft Lens Products

## 2024-05-15 DIAGNOSIS — M25.512 LEFT SHOULDER PAIN, UNSPECIFIED CHRONICITY: Primary | ICD-10-CM

## 2024-05-22 ENCOUNTER — TELEPHONE (OUTPATIENT)
Dept: INTERNAL MEDICINE | Age: 79
End: 2024-05-22

## 2024-05-22 NOTE — TELEPHONE ENCOUNTER
Pts son stopped in asking for Lyrica to be sent into [facility]. Is aware Ali not in until 5/23/24

## 2024-05-28 NOTE — TELEPHONE ENCOUNTER
Please call her son and let him know that I would not prefer to start her on Lyrica without talking to her first unless they mean another medication.

## 2024-06-06 ENCOUNTER — OFFICE VISIT (OUTPATIENT)
Dept: ORTHOPEDIC SURGERY | Age: 79
End: 2024-06-06
Payer: MEDICAID

## 2024-06-06 ENCOUNTER — OFFICE VISIT (OUTPATIENT)
Dept: INTERNAL MEDICINE | Age: 79
End: 2024-06-06
Payer: MEDICAID

## 2024-06-06 ENCOUNTER — HOSPITAL ENCOUNTER (OUTPATIENT)
Dept: PHYSICAL THERAPY | Age: 79
Setting detail: THERAPIES SERIES
Discharge: HOME | End: 2024-06-06
Attending: ORTHOPAEDIC SURGERY
Payer: MEDICAID

## 2024-06-06 ENCOUNTER — HOSPITAL ENCOUNTER (OUTPATIENT)
Age: 79
Discharge: HOME | End: 2024-06-06
Payer: MEDICAID

## 2024-06-06 VITALS — BODY MASS INDEX: 28.53 KG/M2 | WEIGHT: 161 LBS | HEIGHT: 63 IN

## 2024-06-06 VITALS
DIASTOLIC BLOOD PRESSURE: 74 MMHG | OXYGEN SATURATION: 98 % | HEIGHT: 63 IN | HEART RATE: 68 BPM | RESPIRATION RATE: 16 BRPM | BODY MASS INDEX: 28.35 KG/M2 | WEIGHT: 160 LBS | SYSTOLIC BLOOD PRESSURE: 130 MMHG

## 2024-06-06 DIAGNOSIS — I10 HYPERTENSION, UNSPECIFIED TYPE: ICD-10-CM

## 2024-06-06 DIAGNOSIS — R73.01 IFG (IMPAIRED FASTING GLUCOSE): ICD-10-CM

## 2024-06-06 DIAGNOSIS — G62.9 NEUROPATHY: ICD-10-CM

## 2024-06-06 DIAGNOSIS — M17.0 OSTEOARTHRITIS OF BOTH KNEES, UNSPECIFIED OSTEOARTHRITIS TYPE: Primary | ICD-10-CM

## 2024-06-06 DIAGNOSIS — E03.9 HYPOTHYROIDISM, UNSPECIFIED TYPE: ICD-10-CM

## 2024-06-06 DIAGNOSIS — I10 HYPERTENSION, UNSPECIFIED TYPE: Primary | ICD-10-CM

## 2024-06-06 DIAGNOSIS — B37.31 VAGINAL YEAST INFECTION: ICD-10-CM

## 2024-06-06 LAB
ALBUMIN SERPL-MCNC: 4.3 G/DL (ref 3.5–5.2)
ALBUMIN/GLOB SERPL: 1.7 {RATIO} (ref 1–2.5)
ALP SERPL-CCNC: 49 U/L (ref 35–104)
ALT SERPL-CCNC: 11 U/L (ref 5–33)
ANION GAP SERPL CALCULATED.3IONS-SCNC: 9 MMOL/L (ref 9–17)
AST SERPL-CCNC: 12 U/L
BASOPHILS # BLD: 0.07 K/UL (ref 0–0.2)
BASOPHILS NFR BLD: 1 % (ref 0–2)
BILIRUB SERPL-MCNC: 0.3 MG/DL (ref 0.3–1.2)
BUN SERPL-MCNC: 33 MG/DL (ref 8–23)
BUN/CREAT SERPL: 25 (ref 9–20)
CALCIUM SERPL-MCNC: 8.8 MG/DL (ref 8.6–10.4)
CHLORIDE SERPL-SCNC: 104 MMOL/L (ref 98–107)
CO2 SERPL-SCNC: 25 MMOL/L (ref 20–31)
CREAT SERPL-MCNC: 1.3 MG/DL (ref 0.5–0.9)
EOSINOPHIL # BLD: 0.25 K/UL (ref 0–0.44)
EOSINOPHILS RELATIVE PERCENT: 4 % (ref 1–4)
ERYTHROCYTE [DISTWIDTH] IN BLOOD BY AUTOMATED COUNT: 15.6 % (ref 11.8–14.4)
EST. AVERAGE GLUCOSE BLD GHB EST-MCNC: 146 MG/DL
GFR, ESTIMATED: 42 ML/MIN/1.73M2
GLUCOSE SERPL-MCNC: 168 MG/DL (ref 70–99)
HBA1C MFR BLD: 6.7 % (ref 4–6)
HCT VFR BLD AUTO: 35.4 % (ref 36.3–47.1)
HGB BLD-MCNC: 11.7 G/DL (ref 11.9–15.1)
IMM GRANULOCYTES # BLD AUTO: <0.03 K/UL (ref 0–0.3)
IMM GRANULOCYTES NFR BLD: 0 %
LYMPHOCYTES NFR BLD: 2.65 K/UL (ref 1.1–3.7)
LYMPHOCYTES RELATIVE PERCENT: 37 % (ref 24–43)
MCH RBC QN AUTO: 29.3 PG (ref 25.2–33.5)
MCHC RBC AUTO-ENTMCNC: 33.1 G/DL (ref 25.2–33.5)
MCV RBC AUTO: 88.5 FL (ref 82.6–102.9)
MONOCYTES NFR BLD: 0.37 K/UL (ref 0.1–1.2)
MONOCYTES NFR BLD: 5 % (ref 3–12)
NEUTROPHILS NFR BLD: 53 % (ref 36–65)
NEUTS SEG NFR BLD: 3.76 K/UL (ref 1.5–8.1)
NRBC BLD-RTO: 0 PER 100 WBC
PLATELET # BLD AUTO: 286 K/UL (ref 138–453)
PMV BLD AUTO: 10.6 FL (ref 8.1–13.5)
POTASSIUM SERPL-SCNC: 4.4 MMOL/L (ref 3.7–5.3)
PROT SERPL-MCNC: 6.9 G/DL (ref 6.4–8.3)
RBC # BLD AUTO: 4 M/UL (ref 3.95–5.11)
RBC # BLD: ABNORMAL 10*6/UL
SODIUM SERPL-SCNC: 138 MMOL/L (ref 135–144)
T4 FREE SERPL-MCNC: 1.4 NG/DL (ref 0.92–1.68)
TSH SERPL DL<=0.05 MIU/L-ACNC: 7.75 UIU/ML (ref 0.3–5)
WBC OTHER # BLD: 7.1 K/UL (ref 3.5–11.3)

## 2024-06-06 PROCEDURE — 3075F SYST BP GE 130 - 139MM HG: CPT | Performed by: INTERNAL MEDICINE

## 2024-06-06 PROCEDURE — 99214 OFFICE O/P EST MOD 30 MIN: CPT | Performed by: INTERNAL MEDICINE

## 2024-06-06 PROCEDURE — 84439 ASSAY OF FREE THYROXINE: CPT

## 2024-06-06 PROCEDURE — 99214 OFFICE O/P EST MOD 30 MIN: CPT | Performed by: NURSE PRACTITIONER

## 2024-06-06 PROCEDURE — G8399 PT W/DXA RESULTS DOCUMENT: HCPCS | Performed by: NURSE PRACTITIONER

## 2024-06-06 PROCEDURE — G8399 PT W/DXA RESULTS DOCUMENT: HCPCS | Performed by: INTERNAL MEDICINE

## 2024-06-06 PROCEDURE — 1090F PRES/ABSN URINE INCON ASSESS: CPT | Performed by: NURSE PRACTITIONER

## 2024-06-06 PROCEDURE — G8427 DOCREV CUR MEDS BY ELIG CLIN: HCPCS | Performed by: INTERNAL MEDICINE

## 2024-06-06 PROCEDURE — 97161 PT EVAL LOW COMPLEX 20 MIN: CPT | Performed by: PHYSICAL THERAPIST

## 2024-06-06 PROCEDURE — 99212 OFFICE O/P EST SF 10 MIN: CPT | Performed by: INTERNAL MEDICINE

## 2024-06-06 PROCEDURE — 3078F DIAST BP <80 MM HG: CPT | Performed by: INTERNAL MEDICINE

## 2024-06-06 PROCEDURE — 84443 ASSAY THYROID STIM HORMONE: CPT

## 2024-06-06 PROCEDURE — G8427 DOCREV CUR MEDS BY ELIG CLIN: HCPCS | Performed by: NURSE PRACTITIONER

## 2024-06-06 PROCEDURE — 83036 HEMOGLOBIN GLYCOSYLATED A1C: CPT

## 2024-06-06 PROCEDURE — 1090F PRES/ABSN URINE INCON ASSESS: CPT | Performed by: INTERNAL MEDICINE

## 2024-06-06 PROCEDURE — G2211 COMPLEX E/M VISIT ADD ON: HCPCS | Performed by: INTERNAL MEDICINE

## 2024-06-06 PROCEDURE — 1123F ACP DISCUSS/DSCN MKR DOCD: CPT | Performed by: INTERNAL MEDICINE

## 2024-06-06 PROCEDURE — 80053 COMPREHEN METABOLIC PANEL: CPT

## 2024-06-06 PROCEDURE — 99213 OFFICE O/P EST LOW 20 MIN: CPT | Performed by: NURSE PRACTITIONER

## 2024-06-06 PROCEDURE — 85025 COMPLETE CBC W/AUTO DIFF WBC: CPT

## 2024-06-06 PROCEDURE — 36415 COLL VENOUS BLD VENIPUNCTURE: CPT

## 2024-06-06 PROCEDURE — 1036F TOBACCO NON-USER: CPT | Performed by: INTERNAL MEDICINE

## 2024-06-06 PROCEDURE — G8417 CALC BMI ABV UP PARAM F/U: HCPCS | Performed by: NURSE PRACTITIONER

## 2024-06-06 PROCEDURE — 1123F ACP DISCUSS/DSCN MKR DOCD: CPT | Performed by: NURSE PRACTITIONER

## 2024-06-06 PROCEDURE — 1036F TOBACCO NON-USER: CPT | Performed by: NURSE PRACTITIONER

## 2024-06-06 PROCEDURE — G8417 CALC BMI ABV UP PARAM F/U: HCPCS | Performed by: INTERNAL MEDICINE

## 2024-06-06 RX ORDER — FLUCONAZOLE 150 MG/1
150 TABLET ORAL ONCE
Qty: 1 TABLET | Refills: 3 | Status: SHIPPED | OUTPATIENT
Start: 2024-06-06 | End: 2024-06-06

## 2024-06-06 RX ORDER — PREGABALIN 75 MG/1
75 CAPSULE ORAL DAILY
Qty: 90 CAPSULE | Refills: 0 | Status: SHIPPED | OUTPATIENT
Start: 2024-06-06 | End: 2024-09-04

## 2024-06-06 ASSESSMENT — PAIN DESCRIPTION - LOCATION: LOCATION: SHOULDER

## 2024-06-06 ASSESSMENT — PAIN DESCRIPTION - ORIENTATION: ORIENTATION: LEFT

## 2024-06-06 ASSESSMENT — PAIN DESCRIPTION - PAIN TYPE: TYPE: ACUTE PAIN

## 2024-06-06 ASSESSMENT — PAIN DESCRIPTION - DESCRIPTORS: DESCRIPTORS: ACHING;SHARP

## 2024-06-06 ASSESSMENT — PAIN SCALES - GENERAL: PAINLEVEL_OUTOF10: 5

## 2024-06-06 NOTE — FLOWSHEET NOTE
Physical Therapy Daily Treatment Note    Date:  2024    Patient Name:  Sp Jimenez    :  1945  MRN: [id number]  Restrictions/Precautions:   Son translates in Setswana  Medical/Treatment Diagnosis Information:   Diagnosis: M25.512 L shld pain  Treatment Diagnosis: M25.512 L shld pain, impingement  Insurance/Certification information:  PT Insurance Information: [facility]  Physician Information:   Yolanda  Plan of care signed (Y/N):    Visit# / total visits:  1/10  Pain level: 3-710       Time In:9:40   Time Out:10;10    Progress Note: [x]  Yes  []  No  Next due by: Visit #10      Subjective:   See eval    Objective: See eval  Observation:   Test measurements:      Exercises:   Exercise/Equipment Resistance/Repetitions Other comments   Supine pect stretch 2'    L shld ext stretch 10x    Int rotation stretch 10x         B row/extension     6-way t-band                     ROM 3'                        [x] Provided verbal/tactile cueing for activities related to strengthening, flexibility, endurance, ROM. (59572)  [] Provided verbal/tactile cueing for activities related to improving balance, coordination, kinesthetic sense, posture, motor skill, proprioception. (31521)    Therapeutic Activities:     [] Therapeutic activities, direct (one-on-one) patient contact (use of dynamic activities to improve functional performance). (19863)    Gait:   [] Provided training and instruction to the patient for ambulation re-education. (86520)    Self-Care/ADL's  [] Self-care/home management training and compensatory training, meal preparation, safety procedures, and instructions in use of assistive technology devices/adaptive equipment, direct one-on-one contact. (31785)    Home Exercise Program:   Impingement reduction program   [x] Reviewed/Progressed HEP activities related to strengthening, flexibility, endurance, ROM. (60434)  [] Reviewed/Progressed HEP activities related to improving balance, coordination, kinesthetic sense,

## 2024-06-06 NOTE — PROGRESS NOTES
LUE (degrees)  L Shoulder Flex  (0-180): 150 +pain  L Shoulder Ext  (0-45): 30, tight  L Shoulder ABduction (0-180): 90  L Shoulder Int Rotation  (0-70): 55 +pain  L Shoulder Ext Rotation  (0-90): 70 +pain  AROM LUE (degrees)  L Shoulder Flexion (0-180): 145  L Shoulder Extension (0-45): 30  L Shoulder ABduction (0-180): 140  L Shoulder Int Rotation  (0-70): L5  L Shoulder Ext Rotation  (0-90): C7    Strength LUE  Comment: +pain flexion, abd, scaption up & down  L Shoulder Flexion: 4-/5  L Shoulder Extension: 4+/5  L Shoulder ABduction: 4-/5  L Shoulder Internal Rotation: 4+/5  L Shoulder External Rotation: 4-/5     Additional Measures  Special Tests: + impingement with Neer's, horiz add, Leighton -Merida  Other: Tender supraspinatus tendon         Assessment:    Conditions Requiring Skilled Therapeutic Intervention  Body Structures, Functions, Activity Limitations Requiring Skilled Therapeutic Intervention: Increased pain;Decreased ROM;Decreased strength  Therapy Prognosis: Good  Treatment Diagnosis: M25.512 L shld pain, impingement  Activity Tolerance  Activity Tolerance: Patient tolerated treatment well  Activity Tolerance: Patient tolerated treatment well         Plan:    Physical Therapy Plan  Plan weeks: 4  Current Treatment Recommendations: Strengthening, ROM, Home exercise program, Manual      OutComes Score:                      SPADI Total Pain Score : 60 % (06/06/24 1009)  SPADI Total Disability Score  : 43.75 % (06/06/24 1009)  SPADI Total Score : 65 (06/06/24 1009)                      Goals:  Short Term Goals  Time Frame for Short Term Goals: 1 week  Short Term Goal 1: Start HEP  Long Term Goals  Time Frame for Long Term Goals : 4 weeks  Long Term Goal 1: L shld pain controlled at 3/10 to allow regular ADL  Long Term Goal 2: AROM flexion 165 deg, abd 155 deg, behind back to L1, behind head to T1 for efficient dressing, hygiene  Long Term Goal 3: 4/5 strength for home required lifting  Long Term Goal 4:

## 2024-06-06 NOTE — PROGRESS NOTES
or drainage.  No redness warmth or cellulitis.  Mild knee joint effusion.  Denies calf pain to palpation.  Good stability to varus and valgus stress testing.  Crepitus noted with range of motion of the knee.  Toe flexion and extension is intact.  Neurovascularly intact sensation intact to foot.  Patient does have pain to palpation over the medial and lateral joint lines.  Negative Lachemann's. negative Jayme's.        DATA:  CBC:   Lab Results   Component Value Date/Time    WBC 10.1 05/07/2024 02:51 PM    HGB 11.8 05/07/2024 02:51 PM     05/07/2024 02:51 PM     BMP:    Lab Results   Component Value Date/Time     05/07/2024 02:51 PM    K 5.3 05/07/2024 02:51 PM     05/07/2024 02:51 PM    CO2 23 05/07/2024 02:51 PM    BUN 34 05/07/2024 02:51 PM    CREATININE 1.3 05/07/2024 02:51 PM    CALCIUM 8.8 05/07/2024 02:51 PM    GLUCOSE 254 05/07/2024 02:51 PM     PT/INR:    Lab Results   Component Value Date/Time    PROTIME 9.6 11/23/2015 11:18 AM    INR 0.9 11/23/2015 11:18 AM     Troponin:    Lab Results   Component Value Date/Time    TROPONINI 0.00 11/23/2015 12:39 PM     No results for input(s): \"LIPASE\", \"AMYLASE\" in the last 72 hours.  No results for input(s): \"AST\", \"ALT\", \"BILIDIR\", \"BILITOT\", \"ALKPHOS\" in the last 72 hours.  Uric Acid:  No components found for: \"URIC\"  Urine Culture:  No components found for: \"CURINE\"    Radiology:   XR SHOULDER LEFT (MIN 2 VIEWS)    Result Date: 5/14/2024  EXAMINATION: 4 XRAY VIEWS OF THE LEFT SHOULDER 5/14/2024 9:19 am COMPARISON: None. HISTORY: ORDERING SYSTEM PROVIDED HISTORY: Left shoulder pain, unspecified chronicity TECHNOLOGIST PROVIDED HISTORY: Reason for Exam: left shoulder pain FINDINGS: No acute fracture.  Mild glenohumeral degenerative change.  Joint spaces preserved with mild marginal spurring.  The AC joint is unremarkable.  No periarticular soft tissue calcification.     No acute osseous abnormality of the left shoulder.  Mild glenohumeral

## 2024-06-06 NOTE — PLAN OF CARE
[facility], [place]  Rehabilitation and Sports Medicine    [] [place]  Phone: [phone]  Fax: [phone]      [] [place]  Phone: [phone]  Fax: [phone]        To:        Patient: Sp Jimenez  : 1945   MRN: [id number]  Evaluation Date: 2024      Diagnosis Information:  Diagnosis: M25.512 L shld pain   Treatment Diagnosis: M25.512 L shld pain, impingement     Physical Therapy Certification Form  Dear   The following patient has been evaluated for physical therapy services and for therapy to continue, Medicare requires monthly physician review of the treatment plan. Please review the attached evaluation and/or summary of the patient's plan of care, and verify that you agree therapy should continue by signing the attached document and sending it back to our office.    Plan of Care/Treatment to date:  [x] Therapeutic Exercise    [] Modalities:  [] Therapeutic Activity     [] Ultrasound  [] Electrical Stimulation  [] Gait Training      [] Cervical Traction [] Lumbar Traction  [] Neuromuscular Re-education    [] Cold/hotpack [] Iontophoresis   [x] Instruction in HEP     Other:  [x] Manual Therapy      []             [] Aquatic Therapy      []                 Goals:  Short Term Goals  Time Frame for Short Term Goals: 1 week  Short Term Goal 1: Start HEP    Long Term Goals  Time Frame for Long Term Goals : 4 weeks  Long Term Goal 1: L shld pain controlled at 3/10 to allow regular ADL  Long Term Goal 2: AROM flexion 165 deg, abd 155 deg, behind back to L1, behind head to T1 for efficient dressing, hygiene  Long Term Goal 3: 4/5 strength for home required lifting  Long Term Goal 4: Sleep thru 90% of the night    Frequency/Duration:24 - 24  # Days per week: [] 1 day # Weeks: [] 1 week [] 5 weeks     [x] 2 days   [] 2 weeks [] 6 weeks     [] 3 days   [] 3 weeks [] 7 weeks     [] 4 days   [x] 4 weeks [] 8 weeks    Rehab Potential: [] Excellent [x] Good [] Fair  []

## 2024-06-07 ENCOUNTER — TELEPHONE (OUTPATIENT)
Dept: OBGYN | Age: 79
End: 2024-06-07

## 2024-06-12 ENCOUNTER — TELEPHONE (OUTPATIENT)
Dept: INTERNAL MEDICINE | Age: 79
End: 2024-06-12

## 2024-06-12 NOTE — TELEPHONE ENCOUNTER
Timmy called to confirm that Dr. Maguire sent in thyroid meds to [facility], as he did not receive communication from [facility] pharmacy. I confirmed Dr. Maguire sent the prescription in on 6/10/24 and encouraged Timmy to contact [facility] pharmacy and call us back if there are any additional concerns. He verbalized understanding and had no further questions.

## 2024-06-12 NOTE — TELEPHONE ENCOUNTER
Patients son called requesting a refill of patients levothyroxine.  Writer noted that script was sent in on 6/10/24, however son stated that the levothyroxine was supposed to be increased.  Son is aware that Dr. Maguire is not in office until Thursday.  Please advise.

## 2024-06-13 ENCOUNTER — TELEPHONE (OUTPATIENT)
Dept: INTERNAL MEDICINE | Age: 79
End: 2024-06-13

## 2024-06-13 RX ORDER — LEVOTHYROXINE SODIUM 0.1 MG/1
100 TABLET ORAL DAILY
Qty: 90 TABLET | Refills: 2 | Status: SHIPPED | OUTPATIENT
Start: 2024-06-13 | End: 2025-03-10

## 2024-06-13 NOTE — TELEPHONE ENCOUNTER
Sol called from [facility] [phone] stating that she had sent a letter of medical necessity for patients hearing aide to be filled out.  Sima reviewed chart and did not see a LMN scanned in.  Sol agreed to fax another form. Please follow up on this request.

## 2024-09-12 ENCOUNTER — HOSPITAL ENCOUNTER (OUTPATIENT)
Age: 79
Discharge: HOME | End: 2024-09-12
Payer: MEDICAID

## 2024-09-12 ENCOUNTER — OFFICE VISIT (OUTPATIENT)
Dept: INTERNAL MEDICINE | Age: 79
End: 2024-09-12
Payer: MEDICAID

## 2024-09-12 ENCOUNTER — HOSPITAL ENCOUNTER (OUTPATIENT)
Dept: NON INVASIVE DIAGNOSTICS | Age: 79
Discharge: HOME | End: 2024-09-12
Payer: MEDICAID

## 2024-09-12 VITALS
HEART RATE: 76 BPM | BODY MASS INDEX: 28.28 KG/M2 | RESPIRATION RATE: 18 BRPM | HEIGHT: 63 IN | SYSTOLIC BLOOD PRESSURE: 116 MMHG | DIASTOLIC BLOOD PRESSURE: 60 MMHG | WEIGHT: 159.6 LBS

## 2024-09-12 DIAGNOSIS — I25.10 CORONARY ARTERY DISEASE WITHOUT ANGINA PECTORIS, UNSPECIFIED VESSEL OR LESION TYPE, UNSPECIFIED WHETHER NATIVE OR TRANSPLANTED HEART: Primary | ICD-10-CM

## 2024-09-12 DIAGNOSIS — E03.9 HYPOTHYROIDISM, UNSPECIFIED TYPE: ICD-10-CM

## 2024-09-12 DIAGNOSIS — G62.9 NEUROPATHY: ICD-10-CM

## 2024-09-12 DIAGNOSIS — I10 HYPERTENSION, UNSPECIFIED TYPE: ICD-10-CM

## 2024-09-12 LAB — TSH SERPL DL<=0.05 MIU/L-ACNC: 2.49 UIU/ML (ref 0.3–5)

## 2024-09-12 PROCEDURE — 84443 ASSAY THYROID STIM HORMONE: CPT

## 2024-09-12 PROCEDURE — 93005 ELECTROCARDIOGRAM TRACING: CPT | Performed by: INTERNAL MEDICINE

## 2024-09-12 PROCEDURE — 36415 COLL VENOUS BLD VENIPUNCTURE: CPT

## 2024-09-12 PROCEDURE — 99212 OFFICE O/P EST SF 10 MIN: CPT | Performed by: INTERNAL MEDICINE

## 2024-09-12 RX ORDER — FLUCONAZOLE 150 MG/1
150 TABLET ORAL ONCE
Qty: 1 TABLET | Refills: 0 | Status: SHIPPED | OUTPATIENT
Start: 2024-09-12 | End: 2024-09-12

## 2024-09-13 LAB
EKG ATRIAL RATE: 93 BPM
EKG P AXIS: 52 DEGREES
EKG P-R INTERVAL: 130 MS
EKG Q-T INTERVAL: 378 MS
EKG QRS DURATION: 68 MS
EKG QTC CALCULATION (BAZETT): 469 MS
EKG R AXIS: -15 DEGREES
EKG T AXIS: 69 DEGREES
EKG VENTRICULAR RATE: 93 BPM

## 2024-09-17 ENCOUNTER — OFFICE VISIT (OUTPATIENT)
Dept: ORTHOPEDIC SURGERY | Age: 79
End: 2024-09-17
Payer: MEDICAID

## 2024-09-17 VITALS
OXYGEN SATURATION: 94 % | HEIGHT: 63 IN | DIASTOLIC BLOOD PRESSURE: 74 MMHG | BODY MASS INDEX: 28.35 KG/M2 | WEIGHT: 160 LBS | RESPIRATION RATE: 16 BRPM | SYSTOLIC BLOOD PRESSURE: 120 MMHG | HEART RATE: 104 BPM

## 2024-09-17 DIAGNOSIS — M17.11 PRIMARY OSTEOARTHRITIS OF RIGHT KNEE: Primary | ICD-10-CM

## 2024-09-17 PROCEDURE — G8417 CALC BMI ABV UP PARAM F/U: HCPCS | Performed by: PHYSICIAN ASSISTANT

## 2024-09-17 PROCEDURE — 1123F ACP DISCUSS/DSCN MKR DOCD: CPT | Performed by: PHYSICIAN ASSISTANT

## 2024-09-17 PROCEDURE — 3074F SYST BP LT 130 MM HG: CPT | Performed by: PHYSICIAN ASSISTANT

## 2024-09-17 PROCEDURE — 99214 OFFICE O/P EST MOD 30 MIN: CPT | Performed by: PHYSICIAN ASSISTANT

## 2024-09-17 PROCEDURE — G8399 PT W/DXA RESULTS DOCUMENT: HCPCS | Performed by: PHYSICIAN ASSISTANT

## 2024-09-17 PROCEDURE — 3078F DIAST BP <80 MM HG: CPT | Performed by: PHYSICIAN ASSISTANT

## 2024-09-17 PROCEDURE — 1036F TOBACCO NON-USER: CPT | Performed by: PHYSICIAN ASSISTANT

## 2024-09-17 PROCEDURE — G8427 DOCREV CUR MEDS BY ELIG CLIN: HCPCS | Performed by: PHYSICIAN ASSISTANT

## 2024-09-17 PROCEDURE — 1090F PRES/ABSN URINE INCON ASSESS: CPT | Performed by: PHYSICIAN ASSISTANT

## 2024-09-17 PROCEDURE — 99213 OFFICE O/P EST LOW 20 MIN: CPT | Performed by: PHYSICIAN ASSISTANT

## 2024-09-17 RX ORDER — LORATADINE 10 MG/1
10 TABLET ORAL NIGHTLY
COMMUNITY
Start: 2024-08-15

## 2024-09-23 DIAGNOSIS — Z01.818 PRE-OP TESTING: Primary | ICD-10-CM

## 2024-09-24 SDOH — ECONOMIC STABILITY: FOOD INSECURITY: WITHIN THE PAST 12 MONTHS, THE FOOD YOU BOUGHT JUST DIDN'T LAST AND YOU DIDN'T HAVE MONEY TO GET MORE.: NEVER TRUE

## 2024-09-24 SDOH — ECONOMIC STABILITY: FOOD INSECURITY: WITHIN THE PAST 12 MONTHS, YOU WORRIED THAT YOUR FOOD WOULD RUN OUT BEFORE YOU GOT MONEY TO BUY MORE.: NEVER TRUE

## 2024-09-24 SDOH — ECONOMIC STABILITY: INCOME INSECURITY: HOW HARD IS IT FOR YOU TO PAY FOR THE VERY BASICS LIKE FOOD, HOUSING, MEDICAL CARE, AND HEATING?: NOT VERY HARD

## 2024-09-25 ENCOUNTER — TELEPHONE (OUTPATIENT)
Dept: ORTHOPEDIC SURGERY | Age: 79
End: 2024-09-25

## 2024-09-25 NOTE — TELEPHONE ENCOUNTER
[facility]     Pre-Operative Evaluation/Consultation    Name:  Sp Jimenez                                         Age:  79 y.o.  MRN:  [id number]       :  1945   Date:  2024         Sex: female        Surgeon:  Dr. pedersen  Procedure (Planned):  right tka  Date Scheduled surgery: none    Attending : No att. providers found    Primary Physician: 24  Cardiologist: None    Type of Anesthesia Requested: General    Patient Medical history:  No Known Allergies  Social History     Tobacco Use    Smoking status: Never    Smokeless tobacco: Never   Vaping Use    Vaping status: Never Used   Substance Use Topics    Alcohol use: Not Currently    Drug use: No         Additional ordered pre-operative testing:  []CBC    []ABG      [] BMP   []URINALYSIS   []CMP    []HCG   []COAGS PT/INR  []T&C  []LFTs   []TYPE AND SCREEN    [] EKG  [] Chest X-Ray  [] Other Radiology    [] Sent to Hospitalist None  [] Sent to Anesthesia for your review: None   [] Additional Orders: None     Comments:None   Requests: None    Signed: Ewa Crespo LPN 2024 7:10 AM

## 2024-09-30 ENCOUNTER — HOSPITAL ENCOUNTER (OUTPATIENT)
Dept: PHYSICAL THERAPY | Age: 79
Setting detail: THERAPIES SERIES
Discharge: HOME | End: 2024-09-30

## 2024-09-30 NOTE — PROGRESS NOTES
Physical Therapy  Outpatient Physical Therapy    [x] [place]  Phone: [phone]  Fax: [phone]      [] [place]  Phone: [phone]  Fax: [phone]    Physical Therapy  Cancellation/No-show Note  Patient Name:  Sp Jimenez  :  1945   Date:  2024  Cancelled visits to date: 1  No-shows to date: 0    For today's appointment patient:  [x]  Cancelled  [x]  Rescheduled appointment  []  No-show     Reason given by patient:  []  Patient ill  []  Conflicting appointment  []  No transportation    []  Conflict with work  []  No reason given  []  Other:     Comments:  Patient was scheduled for pre-op evaluation. Did not go to nurse's appointment with ortho, therefore cancelled/rescheduled this PT appointment as well.     Electronically signed by: Alfonso Goldberg, PT, DPT

## 2024-10-08 ENCOUNTER — TELEPHONE (OUTPATIENT)
Dept: INTERNAL MEDICINE | Age: 79
End: 2024-10-08

## 2024-10-08 RX ORDER — BENZONATATE 100 MG/1
100 CAPSULE ORAL 3 TIMES DAILY PRN
Qty: 30 CAPSULE | Refills: 0 | Status: SHIPPED | OUTPATIENT
Start: 2024-10-08 | End: 2024-10-18

## 2024-10-08 NOTE — TELEPHONE ENCOUNTER
Timmy called in stating that patient has had an ongoing cough and that patient thinks she has bronchitis, Timmy is requesting something to be sent in for her cough to [facility] in [place]. Please advise     Per patient they have no other sx, just a harsh cough.

## 2024-10-10 ENCOUNTER — OFFICE VISIT (OUTPATIENT)
Dept: INTERNAL MEDICINE | Age: 79
End: 2024-10-10
Payer: MEDICAID

## 2024-10-10 VITALS
OXYGEN SATURATION: 97 % | WEIGHT: 161 LBS | HEIGHT: 63 IN | DIASTOLIC BLOOD PRESSURE: 62 MMHG | BODY MASS INDEX: 28.53 KG/M2 | RESPIRATION RATE: 16 BRPM | HEART RATE: 83 BPM | SYSTOLIC BLOOD PRESSURE: 122 MMHG

## 2024-10-10 DIAGNOSIS — I25.10 CORONARY ARTERY DISEASE WITHOUT ANGINA PECTORIS, UNSPECIFIED VESSEL OR LESION TYPE, UNSPECIFIED WHETHER NATIVE OR TRANSPLANTED HEART: ICD-10-CM

## 2024-10-10 DIAGNOSIS — I10 HYPERTENSION, UNSPECIFIED TYPE: ICD-10-CM

## 2024-10-10 DIAGNOSIS — G62.9 NEUROPATHY: ICD-10-CM

## 2024-10-10 DIAGNOSIS — E03.9 HYPOTHYROIDISM, UNSPECIFIED TYPE: ICD-10-CM

## 2024-10-10 DIAGNOSIS — R05.9 COUGH, UNSPECIFIED TYPE: Primary | ICD-10-CM

## 2024-10-10 PROCEDURE — 99212 OFFICE O/P EST SF 10 MIN: CPT | Performed by: INTERNAL MEDICINE

## 2024-10-10 RX ORDER — CODEINE PHOSPHATE/GUAIFENESIN 10-100MG/5
5 LIQUID (ML) ORAL 2 TIMES DAILY PRN
Qty: 118 ML | Refills: 1 | Status: SHIPPED | OUTPATIENT
Start: 2024-10-10 | End: 2024-10-13

## 2024-10-10 RX ORDER — CODEINE PHOSPHATE/GUAIFENESIN 10-100MG/5
5 LIQUID (ML) ORAL 2 TIMES DAILY PRN
Qty: 118 ML | Refills: 0 | Status: SHIPPED | OUTPATIENT
Start: 2024-10-10 | End: 2024-10-10 | Stop reason: SDUPTHER

## 2024-10-10 RX ORDER — PREGABALIN 150 MG/1
150 CAPSULE ORAL DAILY
Qty: 90 CAPSULE | Refills: 0 | Status: SHIPPED | OUTPATIENT
Start: 2024-10-10 | End: 2025-01-08

## 2024-10-10 RX ORDER — AZITHROMYCIN 250 MG/1
TABLET, FILM COATED ORAL
Qty: 6 TABLET | Refills: 0 | Status: SHIPPED | OUTPATIENT
Start: 2024-10-10 | End: 2024-10-20

## 2024-10-10 RX ORDER — CIPROFLOXACIN AND DEXAMETHASONE 3; 1 MG/ML; MG/ML
4 SUSPENSION/ DROPS AURICULAR (OTIC) 2 TIMES DAILY
Qty: 1 EACH | Refills: 1 | Status: SHIPPED | OUTPATIENT
Start: 2024-10-10 | End: 2024-10-17

## 2024-10-10 RX ORDER — CIPROFLOXACIN AND DEXAMETHASONE 3; 1 MG/ML; MG/ML
4 SUSPENSION/ DROPS AURICULAR (OTIC) 2 TIMES DAILY
Qty: 1 EACH | Refills: 0 | Status: SHIPPED | OUTPATIENT
Start: 2024-10-10 | End: 2024-10-10 | Stop reason: SDUPTHER

## 2024-10-21 ENCOUNTER — HOSPITAL ENCOUNTER (OUTPATIENT)
Age: 79
Discharge: HOME | End: 2024-10-21
Payer: MEDICAID

## 2024-10-21 ENCOUNTER — NURSE ONLY (OUTPATIENT)
Dept: ORTHOPEDIC SURGERY | Age: 79
End: 2024-10-21
Payer: MEDICAID

## 2024-10-21 DIAGNOSIS — Z01.818 PRE-OP TESTING: ICD-10-CM

## 2024-10-21 DIAGNOSIS — M17.11 PRIMARY OSTEOARTHRITIS OF RIGHT KNEE: Primary | ICD-10-CM

## 2024-10-21 LAB
BILIRUB UR QL STRIP: NEGATIVE
CHARACTER UR: ABNORMAL
CLARITY UR: CLEAR
COLOR UR: YELLOW
EPI CELLS #/AREA URNS HPF: ABNORMAL /HPF (ref 0–5)
GLUCOSE UR STRIP-MCNC: NEGATIVE MG/DL
HGB UR QL STRIP.AUTO: NEGATIVE
KETONES UR STRIP-MCNC: NEGATIVE MG/DL
LEUKOCYTE ESTERASE UR QL STRIP: ABNORMAL
NITRITE UR QL STRIP: NEGATIVE
PH UR STRIP: 7 [PH] (ref 5–6)
PROT UR STRIP-MCNC: NEGATIVE MG/DL
RBC #/AREA URNS HPF: ABNORMAL /HPF (ref 0–4)
SP GR UR STRIP: 1.01 (ref 1.01–1.02)
UROBILINOGEN UR STRIP-ACNC: NORMAL EU/DL (ref 0–1)
WBC #/AREA URNS HPF: ABNORMAL /HPF (ref 0–4)

## 2024-10-21 PROCEDURE — 87641 MR-STAPH DNA AMP PROBE: CPT

## 2024-10-21 PROCEDURE — 99215 OFFICE O/P EST HI 40 MIN: CPT

## 2024-10-21 PROCEDURE — 81001 URINALYSIS AUTO W/SCOPE: CPT

## 2024-10-22 LAB
MRSA, DNA, NASAL: NEGATIVE
SPECIMEN DESCRIPTION: NORMAL

## 2024-10-23 NOTE — TELEPHONE ENCOUNTER
Pharmacy requesting a refill of the below medication which has been pended for you:     Requested Prescriptions     Pending Prescriptions Disp Refills    hydroCHLOROthiazide 12.5 MG capsule [Pharmacy Med Name: hydroCHLOROthiazide 12.5 MG Oral Capsule] 90 capsule 0     Sig: Take 1 capsule by mouth once daily in the morning    alendronate (FOSAMAX) 70 MG tablet [Pharmacy Med Name: Alendronate Sodium 70 MG Oral Tablet] 12 tablet 0     Sig: Take 1 tablet by mouth once a week       Last Appointment Date: 10/10/2024  Next Appointment Date: 1/14/2025    No Known Allergies

## 2024-10-24 RX ORDER — HYDROCHLOROTHIAZIDE 12.5 MG/1
CAPSULE ORAL
Qty: 90 CAPSULE | Refills: 0 | Status: SHIPPED | OUTPATIENT
Start: 2024-10-24

## 2024-10-24 RX ORDER — ALENDRONATE SODIUM 70 MG/1
70 TABLET ORAL WEEKLY
Qty: 12 TABLET | Refills: 0 | Status: SHIPPED | OUTPATIENT
Start: 2024-10-24

## 2024-10-29 ENCOUNTER — TELEPHONE (OUTPATIENT)
Dept: INTERNAL MEDICINE | Age: 79
End: 2024-10-29

## 2024-10-29 DIAGNOSIS — J45.909 UNCOMPLICATED ASTHMA, UNSPECIFIED ASTHMA SEVERITY, UNSPECIFIED WHETHER PERSISTENT: Primary | ICD-10-CM

## 2024-10-29 RX ORDER — FLUTICASONE PROPIONATE AND SALMETEROL 500; 50 UG/1; UG/1
1 POWDER RESPIRATORY (INHALATION) EVERY 12 HOURS
Qty: 60 EACH | Refills: 3 | Status: SHIPPED | OUTPATIENT
Start: 2024-10-29

## 2024-10-29 NOTE — TELEPHONE ENCOUNTER
Patient's son called in stating that patient is unable to come to appt due to feeling weak and would like \"throat spray\", Seretide Diskus sent to [facility] pharmacy in [place].

## 2024-10-30 ENCOUNTER — HOSPITAL ENCOUNTER (OUTPATIENT)
Dept: PHYSICAL THERAPY | Age: 79
Setting detail: THERAPIES SERIES
Discharge: HOME | End: 2024-10-30

## 2024-10-30 ENCOUNTER — TELEPHONE (OUTPATIENT)
Dept: ORTHOPEDIC SURGERY | Age: 79
End: 2024-10-30

## 2024-10-30 DIAGNOSIS — M17.11 PRIMARY OSTEOARTHRITIS OF RIGHT KNEE: Primary | ICD-10-CM

## 2024-10-30 DIAGNOSIS — M17.0 OSTEOARTHRITIS OF BOTH KNEES, UNSPECIFIED OSTEOARTHRITIS TYPE: ICD-10-CM

## 2024-10-30 PROCEDURE — 9990000011 HC NO CHARGE THERAPY VISIT: Performed by: PHYSICAL THERAPIST

## 2024-10-30 NOTE — PROGRESS NOTES
Physical Therapy  [facility]  Rehabilitation and Sports Medicine    [X] [place] Phone: [phone] Fax: [phone]   [ ] [place] Phone: [phone] Fax: [phone]    Physical Therapy TKR Pre-Op Note    Date: 10/30/24    Patient Name: Sp Jimenez   :  1945  MRN: [id number]    Time In:  9:18 am    Time Out:  9:38 am    Subjective: Patient arrives to appointment with son who interprets for her as she speaks Serbian - patient and son refused offer for  services and choose to have son interpret for her., RIGHT total knee replacement, antalgia with gait, 10/10 pain. 2 story Home, lives on main floor, 3 JENNYFER with HR, lives with family (son), bathroom has walk-in shower with grab bars and standard toilet with grab bars, d/c plan to go home with assist from family, assistive devices of single point cane, will discuss back up plans with family to have one ready before surgery date    Surgery date: 24    Objective:   AROM R knee  flex: 106° ext: lacking 10° from 0  MMT R knee flex: 4+/5  ext: 4/5      Home Exercise Program: TKR pre-op packet reviewed   [X] Reviewed/Progressed HEP activities related to strengthening, flexibility, endurance, ROM. (29522)  [X] Reviewed/Progressed HEP activities related to improving balance, coordination, kinesthetic sense, posture, motor skill, proprioception. (09406)    Total Treatment Minutes: 20 min    Prognosis: [X ] Good  [ ] Fair   [ ] Poor    Electronically signed by: Alfonso Goldberg, PT, DPT

## 2024-10-30 NOTE — TELEPHONE ENCOUNTER
Patient's son stopped by with Sp and is requesting a raised toilet seat order and a walker with two wheels to be ordered.     Patient surgery is scheduled for Right total knee on 11/19/24

## 2024-11-04 ENCOUNTER — OFFICE VISIT (OUTPATIENT)
Dept: INTERNAL MEDICINE | Age: 79
End: 2024-11-04
Payer: MEDICAID

## 2024-11-04 VITALS
HEIGHT: 63 IN | RESPIRATION RATE: 16 BRPM | OXYGEN SATURATION: 98 % | SYSTOLIC BLOOD PRESSURE: 134 MMHG | HEART RATE: 72 BPM | DIASTOLIC BLOOD PRESSURE: 80 MMHG | WEIGHT: 161 LBS | BODY MASS INDEX: 28.53 KG/M2

## 2024-11-04 DIAGNOSIS — J45.909 UNCOMPLICATED ASTHMA, UNSPECIFIED ASTHMA SEVERITY, UNSPECIFIED WHETHER PERSISTENT: Primary | ICD-10-CM

## 2024-11-04 DIAGNOSIS — E03.9 HYPOTHYROIDISM, UNSPECIFIED TYPE: ICD-10-CM

## 2024-11-04 DIAGNOSIS — I10 HYPERTENSION, UNSPECIFIED TYPE: ICD-10-CM

## 2024-11-04 DIAGNOSIS — R73.01 IFG (IMPAIRED FASTING GLUCOSE): ICD-10-CM

## 2024-11-04 DIAGNOSIS — I25.10 CORONARY ARTERY DISEASE WITHOUT ANGINA PECTORIS, UNSPECIFIED VESSEL OR LESION TYPE, UNSPECIFIED WHETHER NATIVE OR TRANSPLANTED HEART: ICD-10-CM

## 2024-11-04 DIAGNOSIS — R05.9 COUGH, UNSPECIFIED TYPE: ICD-10-CM

## 2024-11-04 PROCEDURE — 90653 IIV ADJUVANT VACCINE IM: CPT | Performed by: INTERNAL MEDICINE

## 2024-11-04 PROCEDURE — 99212 OFFICE O/P EST SF 10 MIN: CPT | Performed by: INTERNAL MEDICINE

## 2024-11-04 RX ORDER — MUPIROCIN 20 MG/G
OINTMENT TOPICAL
Qty: 15 G | Refills: 0 | Status: SHIPPED | OUTPATIENT
Start: 2024-11-04 | End: 2024-11-11

## 2024-11-04 RX ORDER — POLYETHYLENE GLYCOL 3350 17 G/17G
17 POWDER, FOR SOLUTION ORAL DAILY
Qty: 1530 G | Refills: 1 | Status: SHIPPED | OUTPATIENT
Start: 2024-11-04 | End: 2024-12-04

## 2024-11-04 RX ORDER — CODEINE PHOSPHATE/GUAIFENESIN 10-100MG/5
5 LIQUID (ML) ORAL 2 TIMES DAILY PRN
Qty: 118 ML | Refills: 0 | Status: SHIPPED | OUTPATIENT
Start: 2024-11-04 | End: 2024-11-07

## 2024-11-04 NOTE — PROGRESS NOTES
[facility] INTERNAL MEDICINE    Visit Date:  11/4/2024  Patient:  Sp Jimenez  YOB: 1945    Assessment & Plan     Cough: Likely postviral cough.  Will prescribe guaifenesin with codeine.    Constipation: Will prescribe MiraLAX.  Reassess next visit.    Neuropathy: Continue Lyrica.  Reassess next visit.    Allergic rhinitis: Continue Flonase neuropathy: Will prescribe Lyrica.  Reassess next visit.    Anxiety: Continue Lexapro.  Reassess next visit.    Hypertension: Blood pressure controlled.  Continue losartan and hydrochlorothiazide.    Hypothyroidism: Continue levothyroxine.  Will continue to monitor.     Diagnosis Orders   1. Uncomplicated asthma, unspecified asthma severity, unspecified whether persistent        2. Cough, unspecified type  guaiFENesin-codeine (TUSSI-ORGANIDIN NR) 100-10 MG/5ML syrup      3. Coronary artery disease without angina pectoris, unspecified vessel or lesion type, unspecified whether native or transplanted heart        4. Hypertension, unspecified type        5. Hypothyroidism, unspecified type        6. IFG (impaired fasting glucose)            Chief Complaint  Constipation (3 to 4 day pass very little bm )    History of Present Illness   Reports that she has been having constipation for the last 3 to 4 days.  Denies fever, chills, nausea, vomiting, abdominal pain, diarrhea, hematochezia.  Says that she has been using Metamucil with little help.    Moreover, she reports that she still has been having cough.  She recently had a URI.  Denies fever, chills, chest pain, shortness of breath.    Objective  /80 (Site: Left Upper Arm, Position: Sitting, Cuff Size: Medium Adult)   Pulse 72   Resp 16   Ht 1.6 m (5' 3\")   Wt 73 kg (161 lb)   SpO2 98%   BMI 28.52 kg/m²   Constitutional: No acute distress.  Sits in chair comfortably  Eyes: Sclerae nonicteric. No lid lag or proptosis  HENT: External ears normal. No external lesions on the nose  Neck: No

## 2024-11-12 NOTE — DISCHARGE INSTRUCTIONS
pain becomes worse.     Numbness, tingling, or loss of feeling in your leg, knee, or foot.     Pain, burning, urgency, or frequency of urination.      If a medical emergerncy, please call 911 or go to your local emergency room.      Follow up with Dr. Maguire in one week

## 2024-11-18 ENCOUNTER — HOSPITAL ENCOUNTER (OUTPATIENT)
Age: 79
Discharge: HOME | End: 2024-11-18
Payer: MEDICAID

## 2024-11-18 DIAGNOSIS — Z01.818 PRE-OP TESTING: ICD-10-CM

## 2024-11-18 LAB
ABO + RH BLD: NORMAL
ANTIBODY IDENTIFICATION: NORMAL
ARM BAND NUMBER: NORMAL
BASOPHILS # BLD: 0.07 K/UL (ref 0–0.2)
BASOPHILS NFR BLD: 1 % (ref 0–2)
BLOOD BANK SAMPLE EXPIRATION: NORMAL
BLOOD GROUP ANTIBODIES SERPL: POSITIVE
EOSINOPHIL # BLD: 0.36 K/UL (ref 0–0.44)
EOSINOPHILS RELATIVE PERCENT: 6 % (ref 1–4)
ERYTHROCYTE [DISTWIDTH] IN BLOOD BY AUTOMATED COUNT: 15.3 % (ref 11.8–14.4)
HCT VFR BLD AUTO: 34.7 % (ref 36.3–47.1)
HGB BLD-MCNC: 11.6 G/DL (ref 11.9–15.1)
IMM GRANULOCYTES # BLD AUTO: <0.03 K/UL (ref 0–0.3)
IMM GRANULOCYTES NFR BLD: 0 %
LYMPHOCYTES NFR BLD: 2.47 K/UL (ref 1.1–3.7)
LYMPHOCYTES RELATIVE PERCENT: 41 % (ref 24–43)
MCH RBC QN AUTO: 28.9 PG (ref 25.2–33.5)
MCHC RBC AUTO-ENTMCNC: 33.4 G/DL (ref 25.2–33.5)
MCV RBC AUTO: 86.3 FL (ref 82.6–102.9)
MONOCYTES NFR BLD: 0.36 K/UL (ref 0.1–1.2)
MONOCYTES NFR BLD: 6 % (ref 3–12)
NEUTROPHILS NFR BLD: 46 % (ref 36–65)
NEUTS SEG NFR BLD: 2.8 K/UL (ref 1.5–8.1)
NRBC BLD-RTO: 0 PER 100 WBC
PLATELET # BLD AUTO: 203 K/UL (ref 138–453)
PMV BLD AUTO: 11 FL (ref 8.1–13.5)
RBC # BLD AUTO: 4.02 M/UL (ref 3.95–5.11)
RBC # BLD: ABNORMAL 10*6/UL
WBC OTHER # BLD: 6.1 K/UL (ref 3.5–11.3)

## 2024-11-18 PROCEDURE — 85025 COMPLETE CBC W/AUTO DIFF WBC: CPT

## 2024-11-18 PROCEDURE — 86901 BLOOD TYPING SEROLOGIC RH(D): CPT

## 2024-11-18 PROCEDURE — 86850 RBC ANTIBODY SCREEN: CPT

## 2024-11-18 PROCEDURE — 36415 COLL VENOUS BLD VENIPUNCTURE: CPT

## 2024-11-18 PROCEDURE — 86870 RBC ANTIBODY IDENTIFICATION: CPT

## 2024-11-18 PROCEDURE — 86900 BLOOD TYPING SEROLOGIC ABO: CPT

## 2024-11-19 ENCOUNTER — ANESTHESIA EVENT (OUTPATIENT)
Dept: OPERATING ROOM | Age: 79
End: 2024-11-19
Payer: MEDICAID

## 2024-11-19 ENCOUNTER — APPOINTMENT (OUTPATIENT)
Dept: GENERAL RADIOLOGY | Age: 79
End: 2024-11-19
Attending: ORTHOPAEDIC SURGERY
Payer: MEDICAID

## 2024-11-19 ENCOUNTER — ANESTHESIA (OUTPATIENT)
Dept: OPERATING ROOM | Age: 79
End: 2024-11-19
Payer: MEDICAID

## 2024-11-19 ENCOUNTER — HOSPITAL ENCOUNTER (OUTPATIENT)
Age: 79
Setting detail: OBSERVATION
Discharge: HOME | End: 2024-11-21
Attending: ORTHOPAEDIC SURGERY | Admitting: ORTHOPAEDIC SURGERY
Payer: MEDICAID

## 2024-11-19 DIAGNOSIS — Z96.651 STATUS POST RIGHT KNEE REPLACEMENT: Primary | ICD-10-CM

## 2024-11-19 PROBLEM — M17.11 LOCALIZED OSTEOARTHRITIS OF RIGHT KNEE: Status: ACTIVE | Noted: 2024-11-19

## 2024-11-19 LAB
ANION GAP SERPL CALCULATED.3IONS-SCNC: 9 MMOL/L (ref 9–17)
BASOPHILS # BLD: 0.06 K/UL (ref 0–0.2)
BASOPHILS NFR BLD: 1 % (ref 0–2)
BUN SERPL-MCNC: 28 MG/DL (ref 8–23)
BUN/CREAT SERPL: 20 (ref 9–20)
CALCIUM SERPL-MCNC: 8.5 MG/DL (ref 8.6–10.4)
CHLORIDE SERPL-SCNC: 105 MMOL/L (ref 98–107)
CO2 SERPL-SCNC: 26 MMOL/L (ref 20–31)
CREAT SERPL-MCNC: 1.4 MG/DL (ref 0.5–0.9)
EOSINOPHIL # BLD: 0.14 K/UL (ref 0–0.44)
EOSINOPHILS RELATIVE PERCENT: 1 % (ref 1–4)
ERYTHROCYTE [DISTWIDTH] IN BLOOD BY AUTOMATED COUNT: 15.3 % (ref 11.8–14.4)
GFR, ESTIMATED: 38 ML/MIN/1.73M2
GLUCOSE BLD-MCNC: 165 MG/DL (ref 65–105)
GLUCOSE BLD-MCNC: 275 MG/DL (ref 65–105)
GLUCOSE SERPL-MCNC: 159 MG/DL (ref 70–99)
HCT VFR BLD AUTO: 31.1 % (ref 36.3–47.1)
HGB BLD-MCNC: 10.2 G/DL (ref 11.9–15.1)
IMM GRANULOCYTES # BLD AUTO: 0.07 K/UL (ref 0–0.3)
IMM GRANULOCYTES NFR BLD: 1 %
LYMPHOCYTES NFR BLD: 1.08 K/UL (ref 1.1–3.7)
LYMPHOCYTES RELATIVE PERCENT: 11 % (ref 24–43)
MAGNESIUM SERPL-MCNC: 1.9 MG/DL (ref 1.6–2.6)
MCH RBC QN AUTO: 28.6 PG (ref 25.2–33.5)
MCHC RBC AUTO-ENTMCNC: 32.8 G/DL (ref 25.2–33.5)
MCV RBC AUTO: 87.1 FL (ref 82.6–102.9)
MONOCYTES NFR BLD: 0.17 K/UL (ref 0.1–1.2)
MONOCYTES NFR BLD: 2 % (ref 3–12)
NEUTROPHILS NFR BLD: 85 % (ref 36–65)
NEUTS SEG NFR BLD: 8.24 K/UL (ref 1.5–8.1)
NRBC BLD-RTO: 0 PER 100 WBC
PLATELET # BLD AUTO: 209 K/UL (ref 138–453)
PMV BLD AUTO: 11.8 FL (ref 8.1–13.5)
POTASSIUM SERPL-SCNC: 4.7 MMOL/L (ref 3.7–5.3)
RBC # BLD AUTO: 3.57 M/UL (ref 3.95–5.11)
RBC # BLD: ABNORMAL 10*6/UL
SODIUM SERPL-SCNC: 140 MMOL/L (ref 135–144)
WBC OTHER # BLD: 9.8 K/UL (ref 3.5–11.3)

## 2024-11-19 PROCEDURE — 80048 BASIC METABOLIC PNL TOTAL CA: CPT

## 2024-11-19 PROCEDURE — 83735 ASSAY OF MAGNESIUM: CPT

## 2024-11-19 PROCEDURE — 6360000002 HC RX W HCPCS: Performed by: ORTHOPAEDIC SURGERY

## 2024-11-19 PROCEDURE — 6370000000 HC RX 637 (ALT 250 FOR IP): Performed by: INTERNAL MEDICINE

## 2024-11-19 PROCEDURE — 3700000001 HC ADD 15 MINUTES (ANESTHESIA): Performed by: ORTHOPAEDIC SURGERY

## 2024-11-19 PROCEDURE — 93005 ELECTROCARDIOGRAM TRACING: CPT | Performed by: INTERNAL MEDICINE

## 2024-11-19 PROCEDURE — 71045 X-RAY EXAM CHEST 1 VIEW: CPT

## 2024-11-19 PROCEDURE — 97165 OT EVAL LOW COMPLEX 30 MIN: CPT | Performed by: OCCUPATIONAL THERAPIST

## 2024-11-19 PROCEDURE — C1713 ANCHOR/SCREW BN/BN,TIS/BN: HCPCS | Performed by: ORTHOPAEDIC SURGERY

## 2024-11-19 PROCEDURE — 99231 SBSQ HOSP IP/OBS SF/LOW 25: CPT | Performed by: INTERNAL MEDICINE

## 2024-11-19 PROCEDURE — 6360000002 HC RX W HCPCS: Performed by: NURSE PRACTITIONER

## 2024-11-19 PROCEDURE — 85025 COMPLETE CBC W/AUTO DIFF WBC: CPT

## 2024-11-19 PROCEDURE — 6370000000 HC RX 637 (ALT 250 FOR IP): Performed by: NURSE PRACTITIONER

## 2024-11-19 PROCEDURE — 2580000003 HC RX 258: Performed by: NURSE PRACTITIONER

## 2024-11-19 PROCEDURE — 97161 PT EVAL LOW COMPLEX 20 MIN: CPT | Performed by: PHYSICAL THERAPIST

## 2024-11-19 PROCEDURE — C1776 JOINT DEVICE (IMPLANTABLE): HCPCS | Performed by: ORTHOPAEDIC SURGERY

## 2024-11-19 PROCEDURE — 6360000002 HC RX W HCPCS: Performed by: NURSE ANESTHETIST, CERTIFIED REGISTERED

## 2024-11-19 PROCEDURE — 3600000004 HC SURGERY LEVEL 4 BASE: Performed by: ORTHOPAEDIC SURGERY

## 2024-11-19 PROCEDURE — 2709999900 HC NON-CHARGEABLE SUPPLY: Performed by: ORTHOPAEDIC SURGERY

## 2024-11-19 PROCEDURE — 3700000000 HC ANESTHESIA ATTENDED CARE: Performed by: ORTHOPAEDIC SURGERY

## 2024-11-19 PROCEDURE — 7100000001 HC PACU RECOVERY - ADDTL 15 MIN: Performed by: ORTHOPAEDIC SURGERY

## 2024-11-19 PROCEDURE — 2500000003 HC RX 250 WO HCPCS: Performed by: ORTHOPAEDIC SURGERY

## 2024-11-19 PROCEDURE — 2580000003 HC RX 258: Performed by: ORTHOPAEDIC SURGERY

## 2024-11-19 PROCEDURE — 2500000003 HC RX 250 WO HCPCS: Performed by: NURSE ANESTHETIST, CERTIFIED REGISTERED

## 2024-11-19 PROCEDURE — 36415 COLL VENOUS BLD VENIPUNCTURE: CPT

## 2024-11-19 PROCEDURE — 73560 X-RAY EXAM OF KNEE 1 OR 2: CPT

## 2024-11-19 PROCEDURE — G0378 HOSPITAL OBSERVATION PER HR: HCPCS

## 2024-11-19 PROCEDURE — 7100000000 HC PACU RECOVERY - FIRST 15 MIN: Performed by: ORTHOPAEDIC SURGERY

## 2024-11-19 PROCEDURE — 82947 ASSAY GLUCOSE BLOOD QUANT: CPT

## 2024-11-19 PROCEDURE — 3600000014 HC SURGERY LEVEL 4 ADDTL 15MIN: Performed by: ORTHOPAEDIC SURGERY

## 2024-11-19 DEVICE — IMPLANTABLE DEVICE: Type: IMPLANTABLE DEVICE | Site: KNEE | Status: FUNCTIONAL

## 2024-11-19 DEVICE — COMPONENT PAT 29MM DIA 6MM THCK 3 PEG 3 RND PRI STD CEM NP: Type: IMPLANTABLE DEVICE | Site: KNEE | Status: FUNCTIONAL

## 2024-11-19 RX ORDER — HYDROCODONE BITARTRATE AND ACETAMINOPHEN 5; 325 MG/1; MG/1
2 TABLET ORAL EVERY 4 HOURS PRN
Status: DISCONTINUED | OUTPATIENT
Start: 2024-11-19 | End: 2024-11-21 | Stop reason: HOSPADM

## 2024-11-19 RX ORDER — CYCLOBENZAPRINE HCL 10 MG
10 TABLET ORAL 3 TIMES DAILY PRN
Status: DISCONTINUED | OUTPATIENT
Start: 2024-11-19 | End: 2024-11-21 | Stop reason: HOSPADM

## 2024-11-19 RX ORDER — LEVOTHYROXINE SODIUM 100 UG/1
100 TABLET ORAL DAILY
Status: DISCONTINUED | OUTPATIENT
Start: 2024-11-20 | End: 2024-11-21 | Stop reason: HOSPADM

## 2024-11-19 RX ORDER — PROPOFOL 10 MG/ML
INJECTION, EMULSION INTRAVENOUS
Status: DISCONTINUED | OUTPATIENT
Start: 2024-11-19 | End: 2024-11-19 | Stop reason: SDUPTHER

## 2024-11-19 RX ORDER — SODIUM CHLORIDE 9 MG/ML
INJECTION, SOLUTION INTRAVENOUS PRN
Status: DISCONTINUED | OUTPATIENT
Start: 2024-11-19 | End: 2024-11-19 | Stop reason: HOSPADM

## 2024-11-19 RX ORDER — SODIUM CHLORIDE 0.9 % (FLUSH) 0.9 %
5-40 SYRINGE (ML) INJECTION PRN
Status: DISCONTINUED | OUTPATIENT
Start: 2024-11-19 | End: 2024-11-19 | Stop reason: HOSPADM

## 2024-11-19 RX ORDER — BUDESONIDE AND FORMOTEROL FUMARATE DIHYDRATE 80; 4.5 UG/1; UG/1
2 AEROSOL RESPIRATORY (INHALATION)
Status: DISCONTINUED | OUTPATIENT
Start: 2024-11-19 | End: 2024-11-21 | Stop reason: HOSPADM

## 2024-11-19 RX ORDER — MORPHINE SULFATE 4 MG/ML
4 INJECTION, SOLUTION INTRAMUSCULAR; INTRAVENOUS
Status: DISCONTINUED | OUTPATIENT
Start: 2024-11-19 | End: 2024-11-19

## 2024-11-19 RX ORDER — DOCUSATE SODIUM 100 MG/1
100 CAPSULE, LIQUID FILLED ORAL DAILY
Status: DISCONTINUED | OUTPATIENT
Start: 2024-11-19 | End: 2024-11-21 | Stop reason: HOSPADM

## 2024-11-19 RX ORDER — KETOROLAC TROMETHAMINE 30 MG/ML
15 INJECTION, SOLUTION INTRAMUSCULAR; INTRAVENOUS EVERY 6 HOURS
Status: COMPLETED | OUTPATIENT
Start: 2024-11-19 | End: 2024-11-21

## 2024-11-19 RX ORDER — DEXTROSE MONOHYDRATE 100 MG/ML
INJECTION, SOLUTION INTRAVENOUS CONTINUOUS PRN
Status: DISCONTINUED | OUTPATIENT
Start: 2024-11-19 | End: 2024-11-21 | Stop reason: HOSPADM

## 2024-11-19 RX ORDER — ONDANSETRON 2 MG/ML
INJECTION INTRAMUSCULAR; INTRAVENOUS
Status: DISCONTINUED | OUTPATIENT
Start: 2024-11-19 | End: 2024-11-19 | Stop reason: SDUPTHER

## 2024-11-19 RX ORDER — SODIUM CHLORIDE, SODIUM LACTATE, POTASSIUM CHLORIDE, CALCIUM CHLORIDE 600; 310; 30; 20 MG/100ML; MG/100ML; MG/100ML; MG/100ML
INJECTION, SOLUTION INTRAVENOUS CONTINUOUS
Status: DISCONTINUED | OUTPATIENT
Start: 2024-11-19 | End: 2024-11-19 | Stop reason: HOSPADM

## 2024-11-19 RX ORDER — TRANEXAMIC ACID 100 MG/ML
INJECTION, SOLUTION INTRAVENOUS
Status: DISCONTINUED | OUTPATIENT
Start: 2024-11-19 | End: 2024-11-19 | Stop reason: SDUPTHER

## 2024-11-19 RX ORDER — DIPHENHYDRAMINE HYDROCHLORIDE 50 MG/ML
12.5 INJECTION INTRAMUSCULAR; INTRAVENOUS
Status: DISCONTINUED | OUTPATIENT
Start: 2024-11-19 | End: 2024-11-19 | Stop reason: HOSPADM

## 2024-11-19 RX ORDER — INSULIN LISPRO 100 [IU]/ML
3 INJECTION, SOLUTION INTRAVENOUS; SUBCUTANEOUS
Status: DISCONTINUED | OUTPATIENT
Start: 2024-11-19 | End: 2024-11-21 | Stop reason: HOSPADM

## 2024-11-19 RX ORDER — HYDROCODONE BITARTRATE AND ACETAMINOPHEN 5; 325 MG/1; MG/1
1 TABLET ORAL
Qty: 42 TABLET | Refills: 0 | Status: SHIPPED | OUTPATIENT
Start: 2024-11-19 | End: 2024-11-26

## 2024-11-19 RX ORDER — VANCOMYCIN HYDROCHLORIDE 1 G/20ML
INJECTION, POWDER, LYOPHILIZED, FOR SOLUTION INTRAVENOUS PRN
Status: DISCONTINUED | OUTPATIENT
Start: 2024-11-19 | End: 2024-11-19 | Stop reason: ALTCHOICE

## 2024-11-19 RX ORDER — SODIUM CHLORIDE 0.9 % (FLUSH) 0.9 %
5-40 SYRINGE (ML) INJECTION EVERY 12 HOURS SCHEDULED
Status: DISCONTINUED | OUTPATIENT
Start: 2024-11-19 | End: 2024-11-19 | Stop reason: HOSPADM

## 2024-11-19 RX ORDER — ASPIRIN 325 MG
325 TABLET ORAL DAILY
Status: DISCONTINUED | OUTPATIENT
Start: 2024-11-20 | End: 2024-11-21 | Stop reason: HOSPADM

## 2024-11-19 RX ORDER — ASPIRIN 325 MG
325 TABLET ORAL DAILY
Qty: 30 TABLET | Refills: 0 | Status: SHIPPED | OUTPATIENT
Start: 2024-11-19 | End: 2024-12-19

## 2024-11-19 RX ORDER — HYDROCHLOROTHIAZIDE 12.5 MG/1
12.5 TABLET ORAL DAILY
Status: DISCONTINUED | OUTPATIENT
Start: 2024-11-20 | End: 2024-11-21 | Stop reason: HOSPADM

## 2024-11-19 RX ORDER — PROPRANOLOL HYDROCHLORIDE 80 MG/1
80 CAPSULE, EXTENDED RELEASE ORAL DAILY
Status: DISCONTINUED | OUTPATIENT
Start: 2024-11-20 | End: 2024-11-21 | Stop reason: HOSPADM

## 2024-11-19 RX ORDER — CYCLOBENZAPRINE HCL 10 MG
10 TABLET ORAL 3 TIMES DAILY PRN
Qty: 40 TABLET | Refills: 0 | Status: SHIPPED | OUTPATIENT
Start: 2024-11-19 | End: 2024-12-02

## 2024-11-19 RX ORDER — LOSARTAN POTASSIUM 50 MG/1
50 TABLET ORAL DAILY
Status: DISCONTINUED | OUTPATIENT
Start: 2024-11-19 | End: 2024-11-21 | Stop reason: HOSPADM

## 2024-11-19 RX ORDER — NITROGLYCERIN 20 MG/100ML
INJECTION INTRAVENOUS
Status: DISCONTINUED | OUTPATIENT
Start: 2024-11-19 | End: 2024-11-19 | Stop reason: SDUPTHER

## 2024-11-19 RX ORDER — MORPHINE SULFATE 2 MG/ML
2 INJECTION, SOLUTION INTRAMUSCULAR; INTRAVENOUS EVERY 5 MIN PRN
Status: DISCONTINUED | OUTPATIENT
Start: 2024-11-19 | End: 2024-11-19 | Stop reason: HOSPADM

## 2024-11-19 RX ORDER — POLYETHYLENE GLYCOL 3350 17 G/17G
17 POWDER, FOR SOLUTION ORAL DAILY
Status: DISCONTINUED | OUTPATIENT
Start: 2024-11-20 | End: 2024-11-20 | Stop reason: CLARIF

## 2024-11-19 RX ORDER — MORPHINE SULFATE 2 MG/ML
2 INJECTION, SOLUTION INTRAMUSCULAR; INTRAVENOUS
Status: DISCONTINUED | OUTPATIENT
Start: 2024-11-19 | End: 2024-11-19

## 2024-11-19 RX ORDER — TRANEXAMIC ACID 100 MG/ML
INJECTION, SOLUTION INTRAVENOUS PRN
Status: DISCONTINUED | OUTPATIENT
Start: 2024-11-19 | End: 2024-11-19 | Stop reason: ALTCHOICE

## 2024-11-19 RX ORDER — BUPIVACAINE HYDROCHLORIDE 7.5 MG/ML
INJECTION, SOLUTION INTRASPINAL
Status: COMPLETED | OUTPATIENT
Start: 2024-11-19 | End: 2024-11-19

## 2024-11-19 RX ORDER — NALOXONE HYDROCHLORIDE 0.4 MG/ML
INJECTION, SOLUTION INTRAMUSCULAR; INTRAVENOUS; SUBCUTANEOUS PRN
Status: DISCONTINUED | OUTPATIENT
Start: 2024-11-19 | End: 2024-11-19 | Stop reason: HOSPADM

## 2024-11-19 RX ORDER — ONDANSETRON 2 MG/ML
4 INJECTION INTRAMUSCULAR; INTRAVENOUS PRN
Status: DISCONTINUED | OUTPATIENT
Start: 2024-11-19 | End: 2024-11-19 | Stop reason: HOSPADM

## 2024-11-19 RX ORDER — HYDROCHLOROTHIAZIDE 12.5 MG/1
12.5 CAPSULE ORAL DAILY
Status: DISCONTINUED | OUTPATIENT
Start: 2024-11-19 | End: 2024-11-19 | Stop reason: CLARIF

## 2024-11-19 RX ORDER — HYDROCODONE BITARTRATE AND ACETAMINOPHEN 5; 325 MG/1; MG/1
1 TABLET ORAL EVERY 4 HOURS PRN
Status: DISCONTINUED | OUTPATIENT
Start: 2024-11-19 | End: 2024-11-21 | Stop reason: HOSPADM

## 2024-11-19 RX ORDER — ACETAMINOPHEN 325 MG/1
650 TABLET ORAL EVERY 4 HOURS PRN
Status: DISCONTINUED | OUTPATIENT
Start: 2024-11-19 | End: 2024-11-21 | Stop reason: HOSPADM

## 2024-11-19 RX ORDER — ALBUTEROL SULFATE 0.83 MG/ML
2.5 SOLUTION RESPIRATORY (INHALATION) EVERY 6 HOURS PRN
Status: DISCONTINUED | OUTPATIENT
Start: 2024-11-19 | End: 2024-11-21 | Stop reason: HOSPADM

## 2024-11-19 RX ORDER — FENTANYL CITRATE 50 UG/ML
25 INJECTION, SOLUTION INTRAMUSCULAR; INTRAVENOUS EVERY 5 MIN PRN
Status: DISCONTINUED | OUTPATIENT
Start: 2024-11-19 | End: 2024-11-19 | Stop reason: HOSPADM

## 2024-11-19 RX ORDER — GLUCAGON 1 MG/ML
1 KIT INJECTION PRN
Status: DISCONTINUED | OUTPATIENT
Start: 2024-11-19 | End: 2024-11-21 | Stop reason: HOSPADM

## 2024-11-19 RX ORDER — INSULIN GLARGINE 100 [IU]/ML
5 INJECTION, SOLUTION SUBCUTANEOUS
Status: DISCONTINUED | OUTPATIENT
Start: 2024-11-19 | End: 2024-11-21 | Stop reason: HOSPADM

## 2024-11-19 RX ORDER — SODIUM CHLORIDE 9 MG/ML
INJECTION, SOLUTION INTRAVENOUS CONTINUOUS
Status: DISCONTINUED | OUTPATIENT
Start: 2024-11-19 | End: 2024-11-21

## 2024-11-19 RX ORDER — ONDANSETRON 2 MG/ML
4 INJECTION INTRAMUSCULAR; INTRAVENOUS EVERY 6 HOURS PRN
Status: DISCONTINUED | OUTPATIENT
Start: 2024-11-19 | End: 2024-11-21 | Stop reason: HOSPADM

## 2024-11-19 RX ORDER — DEXAMETHASONE SODIUM PHOSPHATE 4 MG/ML
INJECTION, SOLUTION INTRA-ARTICULAR; INTRALESIONAL; INTRAMUSCULAR; INTRAVENOUS; SOFT TISSUE
Status: DISCONTINUED | OUTPATIENT
Start: 2024-11-19 | End: 2024-11-19 | Stop reason: SDUPTHER

## 2024-11-19 RX ADMIN — PROPOFOL 100 MG: 10 INJECTION, EMULSION INTRAVENOUS at 10:49

## 2024-11-19 RX ADMIN — SODIUM CHLORIDE: 9 INJECTION, SOLUTION INTRAVENOUS at 14:04

## 2024-11-19 RX ADMIN — INSULIN LISPRO 3 UNITS: 100 INJECTION, SOLUTION INTRAVENOUS; SUBCUTANEOUS at 17:37

## 2024-11-19 RX ADMIN — TRANEXAMIC ACID 1000 MG: 100 INJECTION, SOLUTION INTRAVENOUS at 10:48

## 2024-11-19 RX ADMIN — NITROGLYCERIN 40 MCG: 20 INJECTION INTRAVENOUS at 11:41

## 2024-11-19 RX ADMIN — DOCUSATE SODIUM 100 MG: 100 CAPSULE, LIQUID FILLED ORAL at 16:03

## 2024-11-19 RX ADMIN — PHENYLEPHRINE HYDROCHLORIDE 200 MCG: 10 INJECTION INTRAVENOUS at 11:49

## 2024-11-19 RX ADMIN — KETOROLAC TROMETHAMINE 15 MG: 30 INJECTION, SOLUTION INTRAMUSCULAR at 22:40

## 2024-11-19 RX ADMIN — Medication 2000 MG: at 17:38

## 2024-11-19 RX ADMIN — PROPOFOL 135 MCG/KG/MIN: 10 INJECTION, EMULSION INTRAVENOUS at 10:50

## 2024-11-19 RX ADMIN — INSULIN GLARGINE 5 UNITS: 100 INJECTION, SOLUTION SUBCUTANEOUS at 17:37

## 2024-11-19 RX ADMIN — KETOROLAC TROMETHAMINE 15 MG: 30 INJECTION, SOLUTION INTRAMUSCULAR at 16:03

## 2024-11-19 RX ADMIN — BUPIVACAINE HYDROCHLORIDE 15 MG: 7.5 INJECTION, SOLUTION INTRASPINAL at 10:16

## 2024-11-19 RX ADMIN — Medication 2000 MG: at 10:48

## 2024-11-19 RX ADMIN — ONDANSETRON 4 MG: 2 INJECTION INTRAMUSCULAR; INTRAVENOUS at 10:51

## 2024-11-19 RX ADMIN — DEXAMETHASONE SODIUM PHOSPHATE 4 MG: 4 INJECTION INTRA-ARTICULAR; INTRALESIONAL; INTRAMUSCULAR; INTRAVENOUS; SOFT TISSUE at 10:51

## 2024-11-19 ASSESSMENT — PAIN DESCRIPTION - LOCATION
LOCATION: LEG;KNEE
LOCATION: KNEE
LOCATION: KNEE
LOCATION: LEG;KNEE

## 2024-11-19 ASSESSMENT — PAIN DESCRIPTION - ORIENTATION
ORIENTATION: RIGHT

## 2024-11-19 ASSESSMENT — PAIN SCALES - WONG BAKER
WONGBAKER_NUMERICALRESPONSE: 2;4
WONGBAKER_NUMERICALRESPONSE: 2;4

## 2024-11-19 ASSESSMENT — PAIN SCALES - GENERAL
PAINLEVEL_OUTOF10: 3
PAINLEVEL_OUTOF10: 0
PAINLEVEL_OUTOF10: 0
PAINLEVEL_OUTOF10: 5

## 2024-11-19 ASSESSMENT — PAIN - FUNCTIONAL ASSESSMENT: PAIN_FUNCTIONAL_ASSESSMENT: FACE, LEGS, ACTIVITY, CRY, AND CONSOLABILITY (FLACC)

## 2024-11-19 ASSESSMENT — PAIN DESCRIPTION - PAIN TYPE
TYPE: SURGICAL PAIN
TYPE: SURGICAL PAIN

## 2024-11-19 NOTE — H&P
change.  Eyes: Denies blurred or decreased in vision.  Ent: Denies any tinnitus or vertigo.  Resp: Denies any cough or shortness of breath.  CV: Denies any syncope, palpitations or chest pain.  GI:  Denies any abdominal pain, nausea, vomiting, constipation or diarrhea.  : Denies any hematuria, hesitancy, or dysuria.  Heme/Lymph: Denies any bleeding.  Musculoskeletal: Positive for right knee pain.  Neuro: Denies any dizziness, paresthesia or weakness.    PHYSICAL EXAM:  No data found.  General appearance:  Alert and oriented x 3. No apparent distress, appears stated age and cooperative.   HEENT:  Normal cephalic, atraumatic without obvious deformity. Pupils equal, round, and reactive to light. Conjunctivae/corneas clear.  Neck: Supple, with full range of motion. Trachea midline.  Respiratory:  Normal respiratory effort. No audible Wheezes or Rhonchi.  Cardiovascular:  Regular rate and rhythm.   Abdomen: Soft, non-tender, non-distended.  Musculoskeletal:   RLE:  Extremity right knee crepitus with flexion extension of the knee.  Lacking 10 degrees of full extension.  She is able to get to 90 degrees of flexion.  Pain to palpation medial joint line.     Skin: Skin color, texture, turgor normal.  No rashes or lesions.  Neurologic:  Neurovascularly intact without any focal sensory/motor deficits. Sensation intact.       DATA:  CBC:   Lab Results   Component Value Date/Time    WBC 6.1 11/18/2024 08:05 AM    HGB 11.6 11/18/2024 08:05 AM     11/18/2024 08:05 AM     BMP:    Lab Results   Component Value Date/Time     06/06/2024 02:05 PM    K 4.4 06/06/2024 02:05 PM     06/06/2024 02:05 PM    CO2 25 06/06/2024 02:05 PM    BUN 33 06/06/2024 02:05 PM    CREATININE 1.3 06/06/2024 02:05 PM    CALCIUM 8.8 06/06/2024 02:05 PM    GLUCOSE 168 06/06/2024 02:05 PM     PT/INR:    Lab Results   Component Value Date/Time    PROTIME 9.6 11/23/2015 11:18 AM    INR 0.9 11/23/2015 11:18 AM     Troponin:    Lab Results

## 2024-11-19 NOTE — OP NOTE
Operative Note      Patient: Sp Jimenez  YOB: 1945  MRN: [id number]    Date of Procedure: 11/19/2024    Pre-Op Diagnosis Codes:      * Primary osteoarthritis of right knee [M17.11]    Post-Op Diagnosis: Same       Procedure(s):  Right total knee arthroplasty    Surgeon(s):  Williams Guerrero MD    Assistant:   Physician Assistant: Jose Garces PA-C    The physician assistant was present for the entirety of the procedure and vital for the performance of the procedure.  He assisted with positioning, prepping, draping of the patient before the procedure and instrument manipulation, extremity repositioning  as well as wound closure, dressing application after the procedure. Please note that no intern, resident, or other hospital staff was available to assist during the surgery    Anesthesia: spinal    Estimated Blood Loss (mL): 200     Complications: None    Specimens:   * No specimens in log *    Implants:  Implant Name Type Inv. Item Serial No.  Lot No. LRB No. Used Action   INSERT TIB CR 2 12 MM TRULIANT - [id number]  INSERT TIB CR 2 12 MM TRULIANT [id number] [employer]  Right 1 Implanted   COMPONENT FEM SZ 2 RT CRUC RET [employer] TRULIANT - [id number]  COMPONENT FEM SZ 2 RT CRUC RET [employer] TRULIANT [id number] [employer]  Right 1 Implanted   COMPONENT PAT 29MM CALI 6MM THCK 3 PEG 3 RND FADI STD [employer] NP - [id number]  COMPONENT PAT 29MM CALI 6MM THCK 3 PEG 3 RND FADI STD [employer] NP [id number] [employer]  Right 1 Implanted   TRULIANT FIT TIBIAL TRAY   [id number] [employer]COV  Right 1 Implanted         Drains: * No LDAs found *    Findings:  Infection Present At Time Of Surgery (PATOS) (choose all levels that have infection present):  No infection present  Other Findings: confirmed    Detailed Description of Procedure:        INDICATIONS: The patient is a 79 y.o.-year-old with Left  knee pain for quite sometime. It affects the patient's day-to-day activities. The patient occasionally has to use assist

## 2024-11-19 NOTE — ANESTHESIA PRE PROCEDURE
Department of Anesthesiology  Preprocedure Note       Name:  Sp Jimenez   Age:  79 y.o.  :  1945                                          MRN:  [id number]         Date:  2024      Surgeon: Surgeon(s):  Williams uGerrero MD    Procedure: Procedure(s):  Right total knee arthroplasty ([facility] [phone])    Medications prior to admission:   Prior to Admission medications    Medication Sig Start Date End Date Taking? Authorizing Provider   polyethylene glycol (GLYCOLAX) 17 GM/SCOOP powder Take 17 g by mouth daily 24  Sg Maguire MD   fluticasone-salmeterol (ADVAIR DISKUS) 500-50 MCG/ACT AEPB diskus inhaler Inhale 1 puff into the lungs in the morning and 1 puff in the evening. 10/29/24   Sg Maguire MD   hydroCHLOROthiazide 12.5 MG capsule Take 1 capsule by mouth once daily in the morning 10/24/24   Sg Maguire MD   alendronate (FOSAMAX) 70 MG tablet Take 1 tablet by mouth once a week 10/24/24   Sg Maguire MD   pregabalin (LYRICA) 150 MG capsule Take 1 capsule by mouth daily for 90 days. Max Daily Amount: 150 mg 10/10/24 1/8/25  Sg Maguire MD   loratadine (CLARITIN) 10 MG tablet Take 1 tablet by mouth nightly  Patient not taking: Reported on 2024 8/15/24   Harper Shaffer MD   levothyroxine (EUTHYROX) 100 MCG tablet Take 1 tablet by mouth Daily 6/13/24 3/10/25  Sg Maguire MD   Azelastine HCl 137 MCG/SPRAY SOLN instill 1 to 2 sprays into each nostril twice a day  Patient not taking: Reported on 2024   Harper Shaffer MD   omeprazole (PRILOSEC) 20 MG delayed release capsule take 1 capsule by mouth once daily 24   Sg Maguire MD   celecoxib (CELEBREX) 100 MG capsule Take 1 capsule by mouth 2 times daily  Patient not taking: Reported on 2024   Melissa Rodriguez, APRN - CNP   propranolol (INDERAL LA) 80 MG extended release capsule Take 1 capsule by mouth daily 24   Sg Maguire MD   hydrocortisone 1 % ointment Apply topically 2 times daily 24

## 2024-11-19 NOTE — ANESTHESIA POSTPROCEDURE EVALUATION
Department of Anesthesiology  Postprocedure Note    Patient: Sp Jimenez  MRN: [id number]  YOB: 1945  Date of evaluation: 11/19/2024    Procedure Summary       Date: 11/19/24 Room / Location: [address]    Anesthesia Start: 1010 Anesthesia Stop: 1202    Procedure: Right total knee arthroplasty (Right) Diagnosis:       Primary osteoarthritis of right knee      (Primary osteoarthritis of right knee [M17.11])    Surgeons: Williams Guerrero MD Responsible Provider: Ray Patel APRN - CRNA    Anesthesia Type: Spinal ASA Status: 3            Anesthesia Type: Spinal    Alyssa Phase I: Alyssa Score: 8    Alyssa Phase II:      Anesthesia Post Evaluation    Patient location during evaluation: PACU  Level of consciousness: awake and alert  Airway patency: patent  Nausea & Vomiting: no nausea and no vomiting  Cardiovascular status: hemodynamically stable  Respiratory status: spontaneous ventilation  Hydration status: stable  Multimodal analgesia pain management approach  Pain management: satisfactory to patient        No notable events documented.

## 2024-11-19 NOTE — ANESTHESIA PROCEDURE NOTES
Spinal Block    Patient location during procedure: holding area  End time: 11/19/2024 10:16 AM  Reason for block: primary anesthetic  Staffing  Performed: resident/CRNA   Resident/CRNA: Ray Patel APRN - CRNA  Performed by: Ray Patel APRN - CRNA  Authorized by: Ray Patel APRN - CRNA    Spinal Block  Patient position: sitting  Prep: ChloraPrep  Patient monitoring: continuous pulse ox and frequent blood pressure checks  Approach: midline  Location: L3/L4  Provider prep: mask and sterile gloves  Needle  Needle type: Benjamin   Needle gauge: 25 G  Needle length: 3.5 in  Assessment  Swirl obtained: Yes  CSF: clear  Attempts: 2  Hemodynamics: stable  Preanesthetic Checklist  Completed: patient identified, IV checked, site marked, risks and benefits discussed, surgical/procedural consents, equipment checked, pre-op evaluation, timeout performed, anesthesia consent given, oxygen available, monitors applied/VS acknowledged, fire risk safety assessment completed and verbalized and blood product R/B/A discussed and consented

## 2024-11-20 LAB
ALBUMIN SERPL-MCNC: 3.5 G/DL (ref 3.5–5.2)
ANION GAP SERPL CALCULATED.3IONS-SCNC: 8 MMOL/L (ref 9–17)
BASOPHILS # BLD: <0.03 K/UL (ref 0–0.2)
BASOPHILS NFR BLD: 0 % (ref 0–2)
BUN SERPL-MCNC: 40 MG/DL (ref 8–23)
BUN/CREAT SERPL: 25 (ref 9–20)
CALCIUM SERPL-MCNC: 7.6 MG/DL (ref 8.6–10.4)
CHLORIDE SERPL-SCNC: 108 MMOL/L (ref 98–107)
CO2 SERPL-SCNC: 23 MMOL/L (ref 20–31)
CREAT SERPL-MCNC: 1.6 MG/DL (ref 0.5–0.9)
EKG ATRIAL RATE: 81 BPM
EKG P AXIS: 39 DEGREES
EKG P-R INTERVAL: 168 MS
EKG Q-T INTERVAL: 410 MS
EKG QRS DURATION: 68 MS
EKG QTC CALCULATION (BAZETT): 476 MS
EKG T AXIS: 91 DEGREES
EKG VENTRICULAR RATE: 81 BPM
EOSINOPHIL # BLD: <0.03 K/UL (ref 0–0.44)
EOSINOPHILS RELATIVE PERCENT: 0 % (ref 1–4)
ERYTHROCYTE [DISTWIDTH] IN BLOOD BY AUTOMATED COUNT: 15.6 % (ref 11.8–14.4)
GFR, ESTIMATED: 32 ML/MIN/1.73M2
GLUCOSE BLD-MCNC: 109 MG/DL (ref 65–105)
GLUCOSE BLD-MCNC: 132 MG/DL (ref 65–105)
GLUCOSE BLD-MCNC: 177 MG/DL (ref 65–105)
GLUCOSE BLD-MCNC: 203 MG/DL (ref 65–105)
GLUCOSE SERPL-MCNC: 191 MG/DL (ref 70–99)
HCT VFR BLD AUTO: 24 % (ref 36.3–47.1)
HCT VFR BLD AUTO: 24.8 % (ref 36.3–47.1)
HCT VFR BLD AUTO: 25.9 % (ref 36.3–47.1)
HGB BLD-MCNC: 8 G/DL (ref 11.9–15.1)
HGB BLD-MCNC: 8.1 G/DL (ref 11.9–15.1)
HGB BLD-MCNC: 8.5 G/DL (ref 11.9–15.1)
IMM GRANULOCYTES # BLD AUTO: 0.04 K/UL (ref 0–0.3)
IMM GRANULOCYTES NFR BLD: 0 %
LYMPHOCYTES NFR BLD: 0.84 K/UL (ref 1.1–3.7)
LYMPHOCYTES RELATIVE PERCENT: 9 % (ref 24–43)
MAGNESIUM SERPL-MCNC: 1.8 MG/DL (ref 1.6–2.6)
MCH RBC QN AUTO: 28.9 PG (ref 25.2–33.5)
MCHC RBC AUTO-ENTMCNC: 32.8 G/DL (ref 25.2–33.5)
MCV RBC AUTO: 88.1 FL (ref 82.6–102.9)
MONOCYTES NFR BLD: 0.5 K/UL (ref 0.1–1.2)
MONOCYTES NFR BLD: 5 % (ref 3–12)
NEUTROPHILS NFR BLD: 86 % (ref 36–65)
NEUTS SEG NFR BLD: 8.05 K/UL (ref 1.5–8.1)
NRBC BLD-RTO: 0 PER 100 WBC
PLATELET # BLD AUTO: 183 K/UL (ref 138–453)
PMV BLD AUTO: 12.3 FL (ref 8.1–13.5)
POTASSIUM SERPL-SCNC: 5 MMOL/L (ref 3.7–5.3)
RBC # BLD AUTO: 2.94 M/UL (ref 3.95–5.11)
RBC # BLD: ABNORMAL 10*6/UL
SODIUM SERPL-SCNC: 139 MMOL/L (ref 135–144)
WBC OTHER # BLD: 9.4 K/UL (ref 3.5–11.3)

## 2024-11-20 PROCEDURE — 82040 ASSAY OF SERUM ALBUMIN: CPT

## 2024-11-20 PROCEDURE — 96365 THER/PROPH/DIAG IV INF INIT: CPT

## 2024-11-20 PROCEDURE — 6370000000 HC RX 637 (ALT 250 FOR IP): Performed by: NURSE PRACTITIONER

## 2024-11-20 PROCEDURE — 6360000002 HC RX W HCPCS: Performed by: INTERNAL MEDICINE

## 2024-11-20 PROCEDURE — G0378 HOSPITAL OBSERVATION PER HR: HCPCS

## 2024-11-20 PROCEDURE — 6370000000 HC RX 637 (ALT 250 FOR IP): Performed by: ORTHOPAEDIC SURGERY

## 2024-11-20 PROCEDURE — 6370000000 HC RX 637 (ALT 250 FOR IP): Performed by: INTERNAL MEDICINE

## 2024-11-20 PROCEDURE — 80048 BASIC METABOLIC PNL TOTAL CA: CPT

## 2024-11-20 PROCEDURE — 97110 THERAPEUTIC EXERCISES: CPT | Performed by: PHYSICAL THERAPY ASSISTANT

## 2024-11-20 PROCEDURE — 96366 THER/PROPH/DIAG IV INF ADDON: CPT

## 2024-11-20 PROCEDURE — 6360000002 HC RX W HCPCS: Performed by: NURSE PRACTITIONER

## 2024-11-20 PROCEDURE — 97116 GAIT TRAINING THERAPY: CPT | Performed by: PHYSICAL THERAPY ASSISTANT

## 2024-11-20 PROCEDURE — 99231 SBSQ HOSP IP/OBS SF/LOW 25: CPT | Performed by: INTERNAL MEDICINE

## 2024-11-20 PROCEDURE — 94640 AIRWAY INHALATION TREATMENT: CPT

## 2024-11-20 PROCEDURE — 85018 HEMOGLOBIN: CPT

## 2024-11-20 PROCEDURE — 83735 ASSAY OF MAGNESIUM: CPT

## 2024-11-20 PROCEDURE — 85025 COMPLETE CBC W/AUTO DIFF WBC: CPT

## 2024-11-20 PROCEDURE — 2580000003 HC RX 258: Performed by: NURSE PRACTITIONER

## 2024-11-20 PROCEDURE — 96374 THER/PROPH/DIAG INJ IV PUSH: CPT

## 2024-11-20 PROCEDURE — 94760 N-INVAS EAR/PLS OXIMETRY 1: CPT

## 2024-11-20 PROCEDURE — 36415 COLL VENOUS BLD VENIPUNCTURE: CPT

## 2024-11-20 PROCEDURE — 96375 TX/PRO/DX INJ NEW DRUG ADDON: CPT

## 2024-11-20 PROCEDURE — 85014 HEMATOCRIT: CPT

## 2024-11-20 PROCEDURE — 96376 TX/PRO/DX INJ SAME DRUG ADON: CPT

## 2024-11-20 RX ORDER — MECLIZINE HCL 12.5 MG 12.5 MG/1
25 TABLET ORAL EVERY 8 HOURS PRN
Status: DISCONTINUED | OUTPATIENT
Start: 2024-11-20 | End: 2024-11-21 | Stop reason: HOSPADM

## 2024-11-20 RX ORDER — POLYETHYLENE GLYCOL 3350 17 G/17G
17 POWDER, FOR SOLUTION ORAL DAILY
Status: DISCONTINUED | OUTPATIENT
Start: 2024-11-20 | End: 2024-11-21 | Stop reason: HOSPADM

## 2024-11-20 RX ORDER — CALCIUM GLUCONATE 20 MG/ML
1000 INJECTION, SOLUTION INTRAVENOUS
Status: COMPLETED | OUTPATIENT
Start: 2024-11-20 | End: 2024-11-20

## 2024-11-20 RX ADMIN — CALCIUM GLUCONATE 1000 MG: 20 INJECTION, SOLUTION INTRAVENOUS at 10:14

## 2024-11-20 RX ADMIN — INSULIN LISPRO 3 UNITS: 100 INJECTION, SOLUTION INTRAVENOUS; SUBCUTANEOUS at 12:05

## 2024-11-20 RX ADMIN — ASPIRIN 325 MG: 325 TABLET ORAL at 08:18

## 2024-11-20 RX ADMIN — HYDROCHLOROTHIAZIDE 12.5 MG: 12.5 TABLET ORAL at 08:18

## 2024-11-20 RX ADMIN — PROPRANOLOL HYDROCHLORIDE 80 MG: 80 CAPSULE, EXTENDED RELEASE ORAL at 08:17

## 2024-11-20 RX ADMIN — SODIUM CHLORIDE: 9 INJECTION, SOLUTION INTRAVENOUS at 20:25

## 2024-11-20 RX ADMIN — CALCIUM GLUCONATE 1000 MG: 20 INJECTION, SOLUTION INTRAVENOUS at 13:05

## 2024-11-20 RX ADMIN — CALCIUM GLUCONATE 1000 MG: 20 INJECTION, SOLUTION INTRAVENOUS at 12:07

## 2024-11-20 RX ADMIN — KETOROLAC TROMETHAMINE 15 MG: 30 INJECTION, SOLUTION INTRAMUSCULAR at 22:51

## 2024-11-20 RX ADMIN — DOCUSATE SODIUM 100 MG: 100 CAPSULE, LIQUID FILLED ORAL at 08:18

## 2024-11-20 RX ADMIN — Medication 2000 MG: at 02:31

## 2024-11-20 RX ADMIN — LEVOTHYROXINE SODIUM 100 MCG: 0.1 TABLET ORAL at 06:10

## 2024-11-20 RX ADMIN — BUDESONIDE AND FORMOTEROL FUMARATE DIHYDRATE 2 PUFF: 80; 4.5 AEROSOL RESPIRATORY (INHALATION) at 20:01

## 2024-11-20 RX ADMIN — SODIUM CHLORIDE: 9 INJECTION, SOLUTION INTRAVENOUS at 00:23

## 2024-11-20 RX ADMIN — LOSARTAN POTASSIUM 50 MG: 50 TABLET, FILM COATED ORAL at 08:18

## 2024-11-20 RX ADMIN — BUDESONIDE AND FORMOTEROL FUMARATE DIHYDRATE 2 PUFF: 80; 4.5 AEROSOL RESPIRATORY (INHALATION) at 08:07

## 2024-11-20 RX ADMIN — KETOROLAC TROMETHAMINE 15 MG: 30 INJECTION, SOLUTION INTRAMUSCULAR at 17:20

## 2024-11-20 RX ADMIN — INSULIN GLARGINE 5 UNITS: 100 INJECTION, SOLUTION SUBCUTANEOUS at 17:20

## 2024-11-20 RX ADMIN — INSULIN LISPRO 3 UNITS: 100 INJECTION, SOLUTION INTRAVENOUS; SUBCUTANEOUS at 17:19

## 2024-11-20 RX ADMIN — MECLIZINE 25 MG: 12.5 TABLET ORAL at 12:57

## 2024-11-20 RX ADMIN — ACETAMINOPHEN 650 MG: 325 TABLET ORAL at 08:50

## 2024-11-20 RX ADMIN — KETOROLAC TROMETHAMINE 15 MG: 30 INJECTION, SOLUTION INTRAMUSCULAR at 04:33

## 2024-11-20 RX ADMIN — POLYETHYLENE GLYCOL 3350 17 G: 17 POWDER, FOR SOLUTION ORAL at 08:50

## 2024-11-20 RX ADMIN — KETOROLAC TROMETHAMINE 15 MG: 30 INJECTION, SOLUTION INTRAMUSCULAR at 11:02

## 2024-11-20 RX ADMIN — SODIUM CHLORIDE: 9 INJECTION, SOLUTION INTRAVENOUS at 10:05

## 2024-11-20 RX ADMIN — INSULIN LISPRO 3 UNITS: 100 INJECTION, SOLUTION INTRAVENOUS; SUBCUTANEOUS at 08:18

## 2024-11-20 RX ADMIN — CALCIUM GLUCONATE 1000 MG: 20 INJECTION, SOLUTION INTRAVENOUS at 11:06

## 2024-11-20 ASSESSMENT — PAIN - FUNCTIONAL ASSESSMENT
PAIN_FUNCTIONAL_ASSESSMENT: ACTIVITIES ARE NOT PREVENTED
PAIN_FUNCTIONAL_ASSESSMENT: ACTIVITIES ARE NOT PREVENTED

## 2024-11-20 ASSESSMENT — PAIN DESCRIPTION - PAIN TYPE: TYPE: SURGICAL PAIN

## 2024-11-20 ASSESSMENT — PAIN SCALES - GENERAL
PAINLEVEL_OUTOF10: 3
PAINLEVEL_OUTOF10: 3
PAINLEVEL_OUTOF10: 2
PAINLEVEL_OUTOF10: 3

## 2024-11-20 ASSESSMENT — PAIN DESCRIPTION - LOCATION: LOCATION: LEG;KNEE

## 2024-11-20 ASSESSMENT — PAIN DESCRIPTION - ORIENTATION: ORIENTATION: RIGHT

## 2024-11-21 ENCOUNTER — HOSPITAL ENCOUNTER (OUTPATIENT)
Dept: PHYSICAL THERAPY | Age: 79
Setting detail: THERAPIES SERIES
Discharge: HOME | End: 2024-11-21
Payer: MEDICAID

## 2024-11-21 ENCOUNTER — HOSPITAL ENCOUNTER (OUTPATIENT)
Dept: PHYSICAL THERAPY | Age: 79
Setting detail: THERAPIES SERIES
End: 2024-11-21
Payer: MEDICAID

## 2024-11-21 VITALS
SYSTOLIC BLOOD PRESSURE: 96 MMHG | TEMPERATURE: 97.9 F | HEIGHT: 63 IN | RESPIRATION RATE: 16 BRPM | BODY MASS INDEX: 30.16 KG/M2 | DIASTOLIC BLOOD PRESSURE: 52 MMHG | HEART RATE: 74 BPM | WEIGHT: 170.2 LBS | OXYGEN SATURATION: 95 %

## 2024-11-21 LAB
ANION GAP SERPL CALCULATED.3IONS-SCNC: 7 MMOL/L (ref 9–17)
BASOPHILS # BLD: 0.04 K/UL (ref 0–0.2)
BASOPHILS NFR BLD: 1 % (ref 0–2)
BUN SERPL-MCNC: 38 MG/DL (ref 8–23)
BUN/CREAT SERPL: 32 (ref 9–20)
CALCIUM SERPL-MCNC: 8 MG/DL (ref 8.6–10.4)
CHLORIDE SERPL-SCNC: 112 MMOL/L (ref 98–107)
CO2 SERPL-SCNC: 24 MMOL/L (ref 20–31)
CREAT SERPL-MCNC: 1.2 MG/DL (ref 0.5–0.9)
EOSINOPHIL # BLD: 0.12 K/UL (ref 0–0.44)
EOSINOPHILS RELATIVE PERCENT: 2 % (ref 1–4)
ERYTHROCYTE [DISTWIDTH] IN BLOOD BY AUTOMATED COUNT: 15.9 % (ref 11.8–14.4)
GFR, ESTIMATED: 46 ML/MIN/1.73M2
GLUCOSE BLD-MCNC: 146 MG/DL (ref 65–105)
GLUCOSE SERPL-MCNC: 96 MG/DL (ref 70–99)
HCT VFR BLD AUTO: 25.1 % (ref 36.3–47.1)
HGB BLD-MCNC: 8.2 G/DL (ref 11.9–15.1)
IMM GRANULOCYTES # BLD AUTO: 0.03 K/UL (ref 0–0.3)
IMM GRANULOCYTES NFR BLD: 0 %
LYMPHOCYTES NFR BLD: 2.71 K/UL (ref 1.1–3.7)
LYMPHOCYTES RELATIVE PERCENT: 36 % (ref 24–43)
MCH RBC QN AUTO: 28.7 PG (ref 25.2–33.5)
MCHC RBC AUTO-ENTMCNC: 32.7 G/DL (ref 25.2–33.5)
MCV RBC AUTO: 87.8 FL (ref 82.6–102.9)
MONOCYTES NFR BLD: 0.55 K/UL (ref 0.1–1.2)
MONOCYTES NFR BLD: 7 % (ref 3–12)
NEUTROPHILS NFR BLD: 54 % (ref 36–65)
NEUTS SEG NFR BLD: 4.13 K/UL (ref 1.5–8.1)
NRBC BLD-RTO: 0 PER 100 WBC
PLATELET # BLD AUTO: 171 K/UL (ref 138–453)
PMV BLD AUTO: 11.9 FL (ref 8.1–13.5)
POTASSIUM SERPL-SCNC: 4.3 MMOL/L (ref 3.7–5.3)
RBC # BLD AUTO: 2.86 M/UL (ref 3.95–5.11)
RBC # BLD: ABNORMAL 10*6/UL
SODIUM SERPL-SCNC: 143 MMOL/L (ref 135–144)
WBC OTHER # BLD: 7.6 K/UL (ref 3.5–11.3)

## 2024-11-21 PROCEDURE — G0378 HOSPITAL OBSERVATION PER HR: HCPCS

## 2024-11-21 PROCEDURE — 99231 SBSQ HOSP IP/OBS SF/LOW 25: CPT | Performed by: INTERNAL MEDICINE

## 2024-11-21 PROCEDURE — 94760 N-INVAS EAR/PLS OXIMETRY 1: CPT

## 2024-11-21 PROCEDURE — 6360000002 HC RX W HCPCS: Performed by: NURSE PRACTITIONER

## 2024-11-21 PROCEDURE — 97116 GAIT TRAINING THERAPY: CPT

## 2024-11-21 PROCEDURE — 97530 THERAPEUTIC ACTIVITIES: CPT

## 2024-11-21 PROCEDURE — 6370000000 HC RX 637 (ALT 250 FOR IP): Performed by: INTERNAL MEDICINE

## 2024-11-21 PROCEDURE — 85025 COMPLETE CBC W/AUTO DIFF WBC: CPT

## 2024-11-21 PROCEDURE — 6370000000 HC RX 637 (ALT 250 FOR IP): Performed by: NURSE PRACTITIONER

## 2024-11-21 PROCEDURE — 36415 COLL VENOUS BLD VENIPUNCTURE: CPT

## 2024-11-21 PROCEDURE — 80048 BASIC METABOLIC PNL TOTAL CA: CPT

## 2024-11-21 PROCEDURE — 96376 TX/PRO/DX INJ SAME DRUG ADON: CPT

## 2024-11-21 PROCEDURE — 97110 THERAPEUTIC EXERCISES: CPT | Performed by: PHYSICAL THERAPY ASSISTANT

## 2024-11-21 PROCEDURE — 94640 AIRWAY INHALATION TREATMENT: CPT

## 2024-11-21 PROCEDURE — 82947 ASSAY GLUCOSE BLOOD QUANT: CPT

## 2024-11-21 RX ADMIN — KETOROLAC TROMETHAMINE 15 MG: 30 INJECTION, SOLUTION INTRAMUSCULAR at 05:55

## 2024-11-21 RX ADMIN — ASPIRIN 325 MG: 325 TABLET ORAL at 10:04

## 2024-11-21 RX ADMIN — BUDESONIDE AND FORMOTEROL FUMARATE DIHYDRATE 2 PUFF: 80; 4.5 AEROSOL RESPIRATORY (INHALATION) at 09:50

## 2024-11-21 RX ADMIN — POLYETHYLENE GLYCOL 3350 17 G: 17 POWDER, FOR SOLUTION ORAL at 09:57

## 2024-11-21 RX ADMIN — HYDROCODONE BITARTRATE AND ACETAMINOPHEN 1 TABLET: 5; 325 TABLET ORAL at 13:07

## 2024-11-21 RX ADMIN — DOCUSATE SODIUM 100 MG: 100 CAPSULE, LIQUID FILLED ORAL at 09:59

## 2024-11-21 RX ADMIN — LEVOTHYROXINE SODIUM 100 MCG: 0.1 TABLET ORAL at 05:56

## 2024-11-21 RX ADMIN — INSULIN LISPRO 3 UNITS: 100 INJECTION, SOLUTION INTRAVENOUS; SUBCUTANEOUS at 09:58

## 2024-11-21 RX ADMIN — INSULIN LISPRO 3 UNITS: 100 INJECTION, SOLUTION INTRAVENOUS; SUBCUTANEOUS at 13:03

## 2024-11-21 RX ADMIN — KETOROLAC TROMETHAMINE 15 MG: 30 INJECTION, SOLUTION INTRAMUSCULAR at 10:04

## 2024-11-21 ASSESSMENT — PAIN DESCRIPTION - ORIENTATION
ORIENTATION: RIGHT

## 2024-11-21 ASSESSMENT — PAIN SCALES - GENERAL
PAINLEVEL_OUTOF10: 3
PAINLEVEL_OUTOF10: 3
PAINLEVEL_OUTOF10: 4
PAINLEVEL_OUTOF10: 5

## 2024-11-21 ASSESSMENT — PAIN DESCRIPTION - LOCATION
LOCATION: KNEE;LEG
LOCATION: LEG;KNEE
LOCATION: KNEE

## 2024-11-21 ASSESSMENT — PAIN DESCRIPTION - DESCRIPTORS
DESCRIPTORS: ACHING;SORE
DESCRIPTORS: SORE

## 2024-11-21 ASSESSMENT — PAIN - FUNCTIONAL ASSESSMENT
PAIN_FUNCTIONAL_ASSESSMENT: PREVENTS OR INTERFERES SOME ACTIVE ACTIVITIES AND ADLS
PAIN_FUNCTIONAL_ASSESSMENT: PREVENTS OR INTERFERES SOME ACTIVE ACTIVITIES AND ADLS

## 2024-11-21 ASSESSMENT — PAIN DESCRIPTION - PAIN TYPE: TYPE: SURGICAL PAIN

## 2024-11-21 NOTE — DISCHARGE SUMMARY
disease) stage 3, GFR 30-59 ml/min (HCC); Hypothyroidism, unspecified type; Chronic neck pain; Vertigo      triamcinolone (KENALOG) 0.1 % cream Apply topically 2 times daily.  Qty: 30 g, Refills: 0      dimenhyDRINATE (DRAMAMINE) 50 MG tablet Take 1 tablet by mouth nightly as needed       !! - Potential duplicate medications found. Please discuss with provider.        STOP taking these medications       esomeprazole (NEXIUM) 40 MG delayed release capsule Comments:   Reason for Stopping:         bisoprolol (ZEBETA) 5 MG tablet Comments:   Reason for Stopping:             Activity: activity as tolerated  Diet: diabetic diet  Wound Care: keep wound clean and dry    Follow-up with Yolanda in 3 weeks.    Signed:  BRITTANY Maurer CNP  11/21/2024  12:25 PM

## 2024-11-21 NOTE — DISCHARGE INSTR - DIET

## 2024-11-21 NOTE — PROGRESS NOTES
Orthopedic Progress Note    Patient:  Sp Jimenez  YOB: 1945     79 y.o. female    Subjective:  Patient seen and examined  No complaints or concerns  No issue overnight  Pain controlled  Denies fever, HA, CP, SOB, N/V, dysuria  Vitals reviewed, afebrile    Objective:   Vitals:    11/20/24 0817   BP: (!) 110/57   Pulse: 84   Resp:    Temp:    SpO2:      Gen: alert, oriented, NAD  Cardiovascular: reg rate, no dependent edema, distal pulses 2+  Respiratory: Chest symmetric, no accessory muscle use, normal respirations, no audible wheezes  RLE: Dressings c/d/I.  Hemovac with 150 cc out. Compartments soft. 2+ DP pulse. TA/EHL/FHL/GS motor intact. Deep and Superficial Peroneal/Saphenous/Sural SILT.    Recent Labs     11/20/24  0451   WBC 9.4   HGB 8.5*   HCT 25.9*         K 5.0   BUN 40*   CREATININE 1.6*   GLUCOSE 191*      Meds: See rec for complete list    Impression/plan: 79 y.o. female s/p s/p R TKA, POD#1     -WB status WBAT  -Pain control PO/IV Medication. Attempt to Wean IV medications.   -DVT ppx: ASA  -Ice/Elevate  -Encourage Incentive Spirometry use  -PT/OT mobilize   -Pull drain prior to discharge  -DC to home today. Follow up 3 weeks         Vijay Niño, DO   Orthopedic Surgery               
Hospitalist Progress Note    Patient:  Sp Jimenez     YOB: 1945    MRN: [id number]   Admit date: 11/19/2024     Acct: [id number]     PCP: Sg Maguire MD    CC--Interval History: POD 1 right TKA---has a HemoVac--will need to check HGB---currently 8.5 down from 10.5    Asthma--controlled    HTN  113/59    DM2------goal 140-180    See note below         All other ROS negative except noted in HPI    Diet:  ADULT DIET; Regular; 5 carb choices (75 gm/meal)    Medications:  Scheduled Meds:   polyethylene glycol  17 g Oral Daily    calcium gluconate  1,000 mg IntraVENous Q1H    budesonide-formoterol  2 puff Inhalation BID RT    levothyroxine  100 mcg Oral Daily    losartan  50 mg Oral Daily    propranolol  80 mg Oral Daily    ketorolac  15 mg IntraVENous Q6H    docusate sodium  100 mg Oral Daily    aspirin  325 mg Oral Daily    hydroCHLOROthiazide  12.5 mg Oral Daily    insulin glargine  5 Units SubCUTAneous Dinner    insulin lispro  3 Units SubCUTAneous TID WC     Continuous Infusions:   sodium chloride 100 mL/hr at 11/20/24 1005    dextrose       PRN Meds:meclizine, cyclobenzaprine, ondansetron, HYDROcodone 5 mg - acetaminophen **OR** HYDROcodone 5 mg - acetaminophen, acetaminophen, HYDROmorphone, albuterol, glucose, dextrose bolus **OR** dextrose bolus, glucagon (rDNA), dextrose    Objective:  Labs:  CBC with Differential:    Lab Results   Component Value Date/Time    WBC 9.4 11/20/2024 04:51 AM    RBC 2.94 11/20/2024 04:51 AM    HGB 8.0 11/20/2024 12:09 PM    HCT 24.0 11/20/2024 12:09 PM     11/20/2024 04:51 AM    MCV 88.1 11/20/2024 04:51 AM    MCH 28.9 11/20/2024 04:51 AM    MCHC 32.8 11/20/2024 04:51 AM    RDW 15.6 11/20/2024 04:51 AM    LYMPHOPCT 9 11/20/2024 04:51 AM    MONOPCT 5 11/20/2024 04:51 AM    EOSPCT 0 11/20/2024 04:51 AM    BASOPCT 0 11/20/2024 04:51 AM    MONOSABS 0.50 11/20/2024 04:51 AM    LYMPHSABS 0.84 11/20/2024 04:51 AM    EOSABS <0.03 11/20/2024 04:51 AM    BASOSABS 
[facility] R[facility], [facility]. Primary osteoarthritis of right knee [M17.11]  Localized osteoarthritis of right knee [M17.11] .   Vitals:    11/19/24 1540   BP: 94/60   Pulse: 82   Resp: 19   Temp:    SpO2: 90%   . Patients home meds are   Prior to Admission medications    Medication Sig Start Date End Date Taking? Authorizing Provider   cyclobenzaprine (FLEXERIL) 10 MG tablet Take 1 tablet by mouth 3 times daily as needed for Muscle spasms 11/19/24 12/2/24 Yes Melissa Rodriguez APRN - CNP   HYDROcodone-acetaminophen (NORCO) 5-325 MG per tablet Take 1 tablet by mouth every 4-6 hours as needed for Pain for up to 7 days. Max Daily Amount: 6 tablets 11/19/24 11/26/24 Yes Melissa Rodriguez APRN - CNP   aspirin (VIOLETTA ASPIRIN) 325 MG tablet Take 1 tablet by mouth daily 11/19/24 12/19/24 Yes Melissa Rodriguez APRN - CNP   propranolol (INDERAL LA) 80 MG extended release capsule Take 1 capsule by mouth daily 4/30/24  Yes Sg Maguire MD   metFORMIN (GLUCOPHAGE) 500 MG tablet Take 1 tablet by mouth 2 times daily (with meals) 8/7/23  Yes Sg Maguire MD   triamterene-hydroCHLOROthiazide (MAXZIDE-25) 37.5-25 MG per tablet Take 1 tablet by mouth daily 8/7/23  Yes Sg Maguire MD   polyethylene glycol (GLYCOLAX) 17 GM/SCOOP powder Take 17 g by mouth daily 11/4/24 12/4/24  Sg Maguire MD   fluticasone-salmeterol (ADVAIR DISKUS) 500-50 MCG/ACT AEPB diskus inhaler Inhale 1 puff into the lungs in the morning and 1 puff in the evening.  Patient not taking: Reported on 11/19/2024 10/29/24   Sg Maguire MD   hydroCHLOROthiazide 12.5 MG capsule Take 1 capsule by mouth once daily in the morning 10/24/24   Sg Maguire MD   alendronate (FOSAMAX) 70 MG tablet Take 1 tablet by mouth once a week  Patient not taking: Reported on 11/19/2024 10/24/24   Sg Maguire MD   pregabalin (LYRICA) 150 MG capsule Take 1 capsule by mouth daily for 90 days. Max Daily Amount: 150 mg  Patient not taking: Reported on 11/19/2024 10/10/24 
Occupational Therapy  Facility/Department: [facility]  Occupational Therapy Initial Assessment    Name: Sp Jimenez  : 1945  MRN: [id number]  Date of Service: 2024    Discharge Recommendations:  Home with assist PRN     Patient Diagnosis(es): The encounter diagnosis was Status post right knee replacement.  Past Medical History:  has a past medical history of Chronic kidney disease, Diabetes mellitus (HCC), HTN (hypertension), and Osteoporosis.  Past Surgical History:  has a past surgical history that includes External ear surgery; Upper gastrointestinal endoscopy (N/A, 2023); Colonoscopy (N/A, 2023); and Total knee arthroplasty (Right, 2024).    Treatment Diagnosis: RTKA      Assessment  Performance deficits / Impairments: Decreased functional mobility ;Decreased ADL status;Decreased ROM;Decreased balance;Decreased high-level IADLs  Treatment Diagnosis: RTKA  Prognosis: Good  Decision Making: Low Complexity        Plan  Occupational Therapy Plan  Times Per Week: 1-2  Current Treatment Recommendations: Self-Care / ADL, Patient/Caregiver education & training, Equipment evaluation, education, & procurement, Functional mobility training    Restrictions  Restrictions/Precautions  Restrictions/Precautions: Weight Bearing  Lower Extremity Weight Bearing Restrictions  Right Lower Extremity Weight Bearing: Weight Bearing As Tolerated    Subjective  General  Chart Reviewed: Yes  Patient assessed for rehabilitation services?: Yes  Family / Caregiver Present: Yes  Referring Practitioner: MADINA Guerrero  Diagnosis: RTKA     Social/Functional History  Social/Functional History  Lives With: Family  Type of Home: House  Home Layout: Two level, Able to Live on Main level with bedroom/bathroom  Home Access: Stairs to enter with rails  Entrance Stairs - Number of Steps: 4-5  ADL Assistance: Independent  Homemaking Assistance: Independent  Ambulation Assistance: Independent  Transfer Assistance: 
Physical Therapy  Facility/Department: [facility]  Daily Treatment Note  NAME: Sp Jimenez  : 1945  MRN: [id number]    Date of Service: 2024    Discharge Recommendations:  Outpatient PT, Home with Home health PT, Continue to assess pending progress        Patient Diagnosis(es): The encounter diagnosis was Status post right knee replacement.    Assessment  Activity Tolerance: Patient tolerated treatment well    Plan  Physical Therapy Plan  General Plan: 2 times a day 7 days a week  Current Treatment Recommendations: Gait training;Transfer training    Restrictions  Restrictions/Precautions  Restrictions/Precautions: Weight Bearing  Lower Extremity Weight Bearing Restrictions  Right Lower Extremity Weight Bearing: Weight Bearing As Tolerated     Subjective   Subjective  Subjective: Patient in bed at initiation of session. Patient agreeable to therapy.  used for duration of session (Session ID: [id number]  Language: Macedonian   ID: #[id number]   Name: Mike)  Pain: No pain noted at initiation of session.  Orientation  Overall Orientation Status: Within Normal Limits  Orientation Level: Oriented X4    Objective  Vitals  O2 Device: None (Room air)  Bed Mobility Training  Bed Mobility Training: Yes  Overall Level of Assistance: Contact-guard assistance;Stand-by assistance  Supine to Sit: Contact-guard assistance;Stand-by assistance  Scooting: Contact-guard assistance  Balance  Sitting: Intact  Standing: High guard  Transfer Training  Transfer Training: Yes  Overall Level of Assistance: Contact-guard assistance  Sit to Stand: Contact-guard assistance  Stand to Sit: Contact-guard assistance  Gait  Gait Training: Yes  Distance (ft): 5 Feet (Distance limited due to being on hard wired heart monitor.)  Assistive Device: Walker, rolling  Speed/Kari: Slow  Step Length: Left shortened;Right shortened     PT Exercises  Exercise Treatment: Supine: Ankle pumps, quad sets, heel 
Physical Therapy  Facility/Department: [facility]  Daily Treatment Note  NAME: Sp Jimenez  : 1945  MRN: [id number]    Date of Service: 2024    Discharge Recommendations:  Outpatient PT, Home with Home health PT, Continue to assess pending progress        Patient Diagnosis(es): The encounter diagnosis was Status post right knee replacement.    Assessment  Activity Tolerance: Patient tolerated treatment well    Plan  Physical Therapy Plan  General Plan: 2 times a day 7 days a week  Current Treatment Recommendations: Gait training;Transfer training    Restrictions  Restrictions/Precautions  Restrictions/Precautions: Weight Bearing  Lower Extremity Weight Bearing Restrictions  Right Lower Extremity Weight Bearing: Weight Bearing As Tolerated     Subjective   Subjective  Subjective: Patient in bed at initiation of session. Patient agreeable to therapy.  Pain: No pain noted at initiation of session.  Orientation  Overall Orientation Status: Within Normal Limits  Orientation Level: Oriented X4    Objective  Vitals  O2 Device: None (Room air)  Bed Mobility Training  Bed Mobility Training: Yes  Overall Level of Assistance: Contact-guard assistance;Stand-by assistance  Supine to Sit: Contact-guard assistance;Stand-by assistance  Scooting: Contact-guard assistance  Balance  Sitting: Intact  Standing: High guard  Transfer Training  Transfer Training: Yes  Overall Level of Assistance: Contact-guard assistance  Sit to Stand: Contact-guard assistance  Stand to Sit: Contact-guard assistance  Gait  Gait Training: Yes  Distance (ft): 25 Feet  Assistive Device: Walker, rolling  Speed/Kari: Slow  Step Length: Left shortened;Right shortened  Gait Abnormalities: Antalgic     PT Exercises  Exercise Treatment: Supine: heel slides, hip abd. Sitting LAQ, Marching x10 ea.     Safety Devices  Type of Devices: Call light within reach;Nurse notified;Gait belt;Left in bed      Goals  Short Term Goals  Time Frame for 
Physical Therapy  Facility/Department: [facility]  Daily Treatment Note  NAME: Sp Jimenez  : 1945  MRN: [id number]    Date of Service: 2024    Discharge Recommendations:  Outpatient PT, Home with Home health PT, Continue to assess pending progress        Patient Diagnosis(es): The encounter diagnosis was Status post right knee replacement.    Assessment  Activity Tolerance: Patient tolerated treatment well    Plan  Physical Therapy Plan  General Plan: 2 times a day 7 days a week  Current Treatment Recommendations: Gait training;Transfer training    Restrictions  Restrictions/Precautions  Restrictions/Precautions: Weight Bearing  Lower Extremity Weight Bearing Restrictions  Right Lower Extremity Weight Bearing: Weight Bearing As Tolerated     Subjective   Subjective  Subjective: Patient in bed at initiation of session. Patient agreeable to therapy session. Family member present for session. ( services used throughout session)  Pain: knee pain reported, not numerically rated  Orientation  Overall Orientation Status: Within Normal Limits  Orientation Level: Oriented X4  Cognition  Overall Cognitive Status: WNL    Objective  Vitals  O2 Device: None (Room air)  Bed Mobility Training  Bed Mobility Training: Yes  Overall Level of Assistance: Contact-guard assistance  Supine to Sit: Contact-guard assistance  Scooting: Stand-by assistance  Balance  Sitting: Intact  Standing: High guard  Transfer Training  Transfer Training: Yes  Overall Level of Assistance: Contact-guard assistance  Sit to Stand: Contact-guard assistance  Stand to Sit: Contact-guard assistance  Gait  Gait Training: Yes  Overall Level of Assistance: Contact-guard assistance  Distance (ft): 60 Feet  Assistive Device: Walker, rolling  Speed/Kari: Slow  Step Length: Left shortened;Right shortened  Gait Abnormalities: Antalgic  Stairs - Level of Assistance: Contact-guard assistance  Number of Stairs Trained: 1     PT 
Physical Therapy  Facility/Department: [facility]  Physical Therapy Initial Assessment    Name: Sp Jimenez  : 1945  MRN: [id number]  Date of Service: 2024    Discharge Recommendations:  Outpatient PT, Home with Home health PT          Patient Diagnosis(es): The encounter diagnosis was Status post right knee replacement.  Past Medical History:  has a past medical history of Chronic kidney disease, Diabetes mellitus (HCC), HTN (hypertension), and Osteoporosis.  Past Surgical History:  has a past surgical history that includes External ear surgery; Upper gastrointestinal endoscopy (N/A, 2023); Colonoscopy (N/A, 2023); and Total knee arthroplasty (Right, 2024).    Assessment  Body Structures, Functions, Activity Limitations Requiring Skilled Therapeutic Intervention: Decreased functional mobility ;Decreased balance;Decreased ROM  Therapy Prognosis: Good  Decision Making: Low Complexity  Activity Tolerance  Activity Tolerance: Patient tolerated treatment well    Plan  Physical Therapy Plan  General Plan: 2 times a day 7 days a week  Current Treatment Recommendations: Gait training, Transfer training  Safety Devices  Type of Devices: Left in bed, Bed alarm in place, Call light within reach    Restrictions        Subjective  General  Chart Reviewed: Yes  Patient assessed for rehabilitation services?: Yes  Response To Previous Treatment: Not applicable  Family / Caregiver Present: Yes  Follows Commands: Within Functional Limits  Other (Comment): Interpretter service used for language         Social/Functional History  Social/Functional History  Lives With: Family  Vision/Hearing       Cognition   Orientation  Overall Orientation Status: Within Functional Limits  Cognition  Overall Cognitive Status: WFL    Objective  Temp: 97.7 °F (36.5 °C)  Pulse: 82  Heart Rate Source: Monitor  Respirations: 19  SpO2: 90 %  O2 Device: None (Room air)  BP: 94/60  MAP (Calculated): 71  BP Location: 
2.71 11/21/2024 05:12 AM    EOSABS 0.12 11/21/2024 05:12 AM    BASOSABS 0.04 11/21/2024 05:12 AM    DIFFTYPE NOT REPORTED 01/26/2021 09:42 AM     BMP:    Lab Results   Component Value Date/Time     11/21/2024 05:12 AM    K 4.3 11/21/2024 05:12 AM     11/21/2024 05:12 AM    CO2 24 11/21/2024 05:12 AM    BUN 38 11/21/2024 05:12 AM    CREATININE 1.2 11/21/2024 05:12 AM    CALCIUM 8.0 11/21/2024 05:12 AM    GFRAA 54 01/26/2021 09:42 AM    LABGLOM 46 11/21/2024 05:12 AM    LABGLOM 42 11/15/2023 01:24 PM    GLUCOSE 96 11/21/2024 05:12 AM           Physical Exam:  Vitals: BP (!) 105/57   Pulse 69   Temp 98.2 °F (36.8 °C) (Temporal)   Resp 16   Ht 1.6 m (5' 3\")   Wt 77.2 kg (170 lb 3.2 oz)   SpO2 94%   BMI 30.15 kg/m²   24 hour intake/output:  Intake/Output Summary (Last 24 hours) at 11/21/2024 0740  Last data filed at 11/21/2024 0600  Gross per 24 hour   Intake 3567.05 ml   Output 1885 ml   Net 1682.05 ml     Last 3 weights:  Wt Readings from Last 3 Encounters:   11/21/24 77.2 kg (170 lb 3.2 oz)   11/04/24 73 kg (161 lb)   10/10/24 73 kg (161 lb)     HEENT: Normocephalic and Atraumatic  Neck: Supple, No Masses, Tenderness, Nodularity, and No Lymphadenopathy  Chest/Lungs: Clear to Auscultation without Rales, Rhonchi, or Wheezes  Cardiac: Regular Rate and Rhythm  GI/Abdomen: Bowel Sounds Present and Soft, Non-tender, without Guarding or Rebound Tenderness  : Not examined  EXT/Skin:  HemoVac removed--minimal drainage, No Edema, No Cyanosis, and No Clubbing---sp right TKA  Neuro:  alert---generalized weakness---gait-balance instability--- sp right TKA      Assessment:    Principal Problem:    Localized osteoarthritis of right knee  Active Problems:    Status post right knee replacement  Resolved Problems:    * No resolved hospital problems. *                                                              Tony = malathi Snowden = MARIAJOSE Collazo          79 WF  [sp Yolanda, Orthopedics;  DC 
equipment and technique for increased ease ADLs - MET    AM-PAC - ADL       Therapy Time   Individual Concurrent Group Co-treatment   Time In 1548         Time Out 1558         Minutes 10         Timed Code Treatment Minutes: 10 Minutes       CHING Martini     
quad sets, heel slides, SLR x10. Sitting LAQ, Marching, HR, TR x10 ea.  Resistive Exercises: Hip abd/add x10 ea with manual resistance.     Safety Devices  Type of Devices: Call light within reach;Nurse notified;Gait belt;Left in chair      Goals  Short Term Goals  Time Frame for Short Term Goals: 1 day  Short Term Goal 1: Assess functional status  Long Term Goals  Time Frame for Long Term Goals : 2 days  Long Term Goal 1: Transfer SBA  Long Term Goal 2: gait RW WBAT 30-40 ft    Education  Patient Education  Education Given To: Patient  Education Provided: Role of Therapy;Transfer Training  Education Method: Demonstration;Verbal  Barriers to Learning: None  Education Outcome: Verbalized understanding;Continued education needed    AM-PAC - Mobility              Therapy Time   Individual Concurrent Group Co-treatment   Time In 0854         Time Out 0912         Minutes 18                 Rm Cagle PTA

## 2024-11-21 NOTE — CONSULTS
Language: Welsh   ID: #[id number]   Name: Ammar   
Session ID: [id number]  Language: Bulgarian   ID: #[id number]   Name: Shanon
Session ID: [id number]  Language: Armenian   ID: #[id number]   Name: Fadumo
Session ID: [id number]  Language: Yakut   ID: #[id number]   Name: Roosevelt
Session ID: [id number]  Language: Divehi   ID: #[id number]   Name: Ivana
Session ID: [id number]  Language: Kiswahili   ID: #[id number]   Name: Ami
Session ID: [id number]  Language: Turkish   ID: #[id number]   Name: Mike
Session ID: [id number]  Language: French   ID: #[id number]   Name: Shanon
Session ID: [id number]  Language: Kiswahili   ID: #[id number]   Name: Karley
11/19/2024 02:17 PM    DIFFTYPE NOT REPORTED 01/26/2021 09:42 AM     BMP:    Lab Results   Component Value Date/Time     11/19/2024 02:17 PM    K 4.7 11/19/2024 02:17 PM     11/19/2024 02:17 PM    CO2 26 11/19/2024 02:17 PM    BUN 28 11/19/2024 02:17 PM    CREATININE 1.4 11/19/2024 02:17 PM    CALCIUM 8.5 11/19/2024 02:17 PM    GFRAA 54 01/26/2021 09:42 AM    LABGLOM 38 11/19/2024 02:17 PM    LABGLOM 42 11/15/2023 01:24 PM    GLUCOSE 159 11/19/2024 02:17 PM           Physical Exam:  Vitals: BP 94/79   Pulse 79   Temp 97.7 °F (36.5 °C)   Resp 19   Ht 1.6 m (5' 3\")   Wt 75.8 kg (167 lb 3.2 oz)   SpO2 (!) 89%   BMI 29.62 kg/m²   24 hour intake/output:  Intake/Output Summary (Last 24 hours) at 11/19/2024 1520  Last data filed at 11/19/2024 1420  Gross per 24 hour   Intake 250 ml   Output --   Net 250 ml     Last 3 weights:  Wt Readings from Last 3 Encounters:   11/19/24 75.8 kg (167 lb 3.2 oz)   11/04/24 73 kg (161 lb)   10/10/24 73 kg (161 lb)     HEENT: Normocephalic and Atraumatic  Neck: Supple, No Masses, Tenderness, Nodularity, and No Lymphadenopathy  Chest/Lungs: Clear to Auscultation without Rales, Rhonchi, or Wheezes  Cardiac: Regular Rate and Rhythm  GI/Abdomen: Bowel Sounds Present and Soft, Non-tender, without Guarding or Rebound Tenderness  : Not examined  EXT/Skin:  sp right TKA----dressing CDI---vacuum drain present, No Edema, No Cyanosis, and No Clubbing  Neuro:  but for sp right TKA, Alert and Oriented, and No Localizing Signs/Symptoms      Assessment:    Principal Problem:    Localized osteoarthritis of right knee  Active Problems:    Status post right knee replacement  Resolved Problems:    * No resolved hospital problems. *    MARIAJOSE RAMOS          79 WF  [sp Yolanda, Orthopedics;  DC Cardiology]    FULL CODE           Anti-infectives:   Ancef IV x 3 dose    POD ____ right TKA---11.19.2024  OA right knee  Osteoporosis     Asthma   HTN  ASCVD

## 2024-11-21 NOTE — PLAN OF CARE
Problem: Discharge Planning  Goal: Discharge to home or other facility with appropriate resources  11/20/2024 1128 by Krystal Lance RN  Outcome: Progressing  11/20/2024 0240 by Di Briones RN  Outcome: Progressing  Flowsheets (Taken 11/19/2024 2020)  Discharge to home or other facility with appropriate resources:   Identify barriers to discharge with patient and caregiver   Arrange for needed discharge resources and transportation as appropriate   Identify discharge learning needs (meds, wound care, etc)   Refer to discharge planning if patient needs post-hospital services based on physician order or complex needs related to functional status, cognitive ability or social support system   Arrange for interpreters to assist at discharge as needed     Problem: Pain  Goal: Verbalizes/displays adequate comfort level or baseline comfort level  11/20/2024 1128 by Krystal Lance RN  Outcome: Progressing  11/20/2024 0240 by Di Briones RN  Outcome: Progressing     Problem: Chronic Conditions and Co-morbidities  Goal: Patient's chronic conditions and co-morbidity symptoms are monitored and maintained or improved  11/20/2024 1128 by Krystal Lance RN  Outcome: Progressing  11/20/2024 0240 by Di Briones RN  Outcome: Progressing  Flowsheets (Taken 11/19/2024 2020)  Care Plan - Patient's Chronic Conditions and Co-Morbidity Symptoms are Monitored and Maintained or Improved:   Monitor and assess patient's chronic conditions and comorbid symptoms for stability, deterioration, or improvement   Update acute care plan with appropriate goals if chronic or comorbid symptoms are exacerbated and prevent overall improvement and discharge   Collaborate with multidisciplinary team to address chronic and comorbid conditions and prevent exacerbation or deterioration     Problem: ABCDS Injury Assessment  Goal: Absence of physical injury  11/20/2024 1128 by Krystal Lance RN  Outcome: Progressing  11/20/2024 
  Problem: Discharge Planning  Goal: Discharge to home or other facility with appropriate resources  11/21/2024 1017 by Juliana Bermudez RN  Outcome: Progressing  Flowsheets (Taken 11/21/2024 0930)  Discharge to home or other facility with appropriate resources:   Identify barriers to discharge with patient and caregiver   Arrange for needed discharge resources and transportation as appropriate   Identify discharge learning needs (meds, wound care, etc)   Refer to discharge planning if patient needs post-hospital services based on physician order or complex needs related to functional status, cognitive ability or social support system  11/21/2024 0130 by Di Briones RN  Outcome: Progressing  Flowsheets (Taken 11/20/2024 2007)  Discharge to home or other facility with appropriate resources:   Arrange for needed discharge resources and transportation as appropriate   Identify barriers to discharge with patient and caregiver   Identify discharge learning needs (meds, wound care, etc)   Arrange for interpreters to assist at discharge as needed   Refer to discharge planning if patient needs post-hospital services based on physician order or complex needs related to functional status, cognitive ability or social support system     Problem: Pain  Goal: Verbalizes/displays adequate comfort level or baseline comfort level  11/21/2024 1017 by Juliana Bermudez RN  Outcome: Progressing  11/21/2024 0130 by Di Briones RN  Outcome: Progressing     Problem: Chronic Conditions and Co-morbidities  Goal: Patient's chronic conditions and co-morbidity symptoms are monitored and maintained or improved  11/21/2024 1017 by Juliana Bermudez RN  Outcome: Progressing  Flowsheets (Taken 11/21/2024 0930)  Care Plan - Patient's Chronic Conditions and Co-Morbidity Symptoms are Monitored and Maintained or Improved:   Monitor and assess patient's chronic conditions and comorbid symptoms for stability, deterioration, or improvement   
  Problem: Discharge Planning  Goal: Discharge to home or other facility with appropriate resources  Outcome: Progressing  Flowsheets (Taken 11/19/2024 2020)  Discharge to home or other facility with appropriate resources:   Identify barriers to discharge with patient and caregiver   Arrange for needed discharge resources and transportation as appropriate   Identify discharge learning needs (meds, wound care, etc)   Refer to discharge planning if patient needs post-hospital services based on physician order or complex needs related to functional status, cognitive ability or social support system   Arrange for interpreters to assist at discharge as needed     Problem: Pain  Goal: Verbalizes/displays adequate comfort level or baseline comfort level  Outcome: Progressing     Problem: Chronic Conditions and Co-morbidities  Goal: Patient's chronic conditions and co-morbidity symptoms are monitored and maintained or improved  Outcome: Progressing  Flowsheets (Taken 11/19/2024 2020)  Care Plan - Patient's Chronic Conditions and Co-Morbidity Symptoms are Monitored and Maintained or Improved:   Monitor and assess patient's chronic conditions and comorbid symptoms for stability, deterioration, or improvement   Update acute care plan with appropriate goals if chronic or comorbid symptoms are exacerbated and prevent overall improvement and discharge   Collaborate with multidisciplinary team to address chronic and comorbid conditions and prevent exacerbation or deterioration     Problem: ABCDS Injury Assessment  Goal: Absence of physical injury  Outcome: Progressing     Problem: Skin/Tissue Integrity - Adult  Goal: Skin integrity remains intact  Outcome: Progressing  Flowsheets (Taken 11/19/2024 2020)  Skin Integrity Remains Intact: Monitor for areas of redness and/or skin breakdown  Goal: Incisions, wounds, or drain sites healing without S/S of infection  Outcome: Progressing  Flowsheets (Taken 11/19/2024 2020)  Incisions, 
  Problem: Discharge Planning  Goal: Discharge to home or other facility with appropriate resources  Outcome: Progressing  Flowsheets (Taken 11/20/2024 2007)  Discharge to home or other facility with appropriate resources:   Arrange for needed discharge resources and transportation as appropriate   Identify barriers to discharge with patient and caregiver   Identify discharge learning needs (meds, wound care, etc)   Arrange for interpreters to assist at discharge as needed   Refer to discharge planning if patient needs post-hospital services based on physician order or complex needs related to functional status, cognitive ability or social support system     Problem: Pain  Goal: Verbalizes/displays adequate comfort level or baseline comfort level  Outcome: Progressing     Problem: Chronic Conditions and Co-morbidities  Goal: Patient's chronic conditions and co-morbidity symptoms are monitored and maintained or improved  Outcome: Progressing  Flowsheets (Taken 11/20/2024 2007)  Care Plan - Patient's Chronic Conditions and Co-Morbidity Symptoms are Monitored and Maintained or Improved:   Monitor and assess patient's chronic conditions and comorbid symptoms for stability, deterioration, or improvement   Collaborate with multidisciplinary team to address chronic and comorbid conditions and prevent exacerbation or deterioration   Update acute care plan with appropriate goals if chronic or comorbid symptoms are exacerbated and prevent overall improvement and discharge     Problem: ABCDS Injury Assessment  Goal: Absence of physical injury  Outcome: Progressing     Problem: Skin/Tissue Integrity - Adult  Goal: Skin integrity remains intact  Outcome: Progressing  Flowsheets (Taken 11/20/2024 2007)  Skin Integrity Remains Intact: Monitor for areas of redness and/or skin breakdown  Goal: Incisions, wounds, or drain sites healing without S/S of infection  Outcome: Progressing  Flowsheets (Taken 11/20/2024 2007)  Incisions, 
Progressing  Flowsheets (Taken 11/20/2024 2007)  Return Mobility to Safest Level of Function:   Assess patient stability and activity tolerance for standing, transferring and ambulating with or without assistive devices   Assist with transfers and ambulation using safe patient handling equipment as needed   Ensure adequate protection for wounds/incisions during mobilization   Obtain physical therapy/occupational therapy consults as needed  Goal: Maintain proper alignment of affected body part  11/21/2024 1432 by Juliana Bermudez RN  Outcome: Completed  11/21/2024 1017 by Juliana Bermudez RN  Outcome: Progressing  Flowsheets (Taken 11/21/2024 0930)  Maintain proper alignment of affected body part:   Support and protect limb and body alignment per provider's orders   Instruct and reinforce with patient and family use of appropriate assistive device and precautions (e.g. spinal or hip dislocation precautions)  11/21/2024 0130 by Di Briones RN  Outcome: Progressing  Flowsheets (Taken 11/20/2024 2007)  Maintain proper alignment of affected body part:   Support and protect limb and body alignment per provider's orders   Instruct and reinforce with patient and family use of appropriate assistive device and precautions (e.g. spinal or hip dislocation precautions)  Goal: Return ADL status to a safe level of function  11/21/2024 1432 by Juliana Bermudez RN  Outcome: Completed  11/21/2024 1017 by Juliana Bermudez RN  Outcome: Progressing  Flowsheets (Taken 11/21/2024 0930)  Return ADL Status to a Safe Level of Function:   Administer medication as ordered   Assess activities of daily living deficits and provide assistive devices as needed   Obtain physical therapy/occupational therapy consults as needed   Assist and instruct patient to increase activity and self care as tolerated  11/21/2024 0130 by Di Briones RN  Outcome: Progressing  Flowsheets (Taken 11/20/2024 2007)  Return ADL Status to a Safe Level of

## 2024-11-21 NOTE — PROGRESS NOTES
Physical Therapy    Outpatient Physical Therapy    [x] [place]  Phone: [phone]  Fax: [phone]      [] [place]  Phone: [phone]  Fax: [phone]    Physical Therapy  Cancellation/No-show Note  Patient Name:  Sp Jimenez  :  1945   Date:  2024  Cancelled visits to date: 1  No-shows to date: 0    For today's appointment patient:  [x]  Cancelled  []  Rescheduled appointment  []  No-show     Reason given by patient:  []  Patient ill  []  Conflicting appointment  []  No transportation    []  Conflict with work  []  No reason given  [x]  Other:     Comments:  Patient still admitted.      Electronically signed by: Mian Hurt, PT

## 2024-11-22 ENCOUNTER — TELEPHONE (OUTPATIENT)
Dept: INTERNAL MEDICINE | Age: 79
End: 2024-11-22

## 2024-11-22 ENCOUNTER — CARE COORDINATION (OUTPATIENT)
Dept: CARE COORDINATION | Age: 79
End: 2024-11-22

## 2024-11-22 ENCOUNTER — HOSPITAL ENCOUNTER (OUTPATIENT)
Dept: PHYSICAL THERAPY | Age: 79
Setting detail: THERAPIES SERIES
Discharge: HOME | End: 2024-11-22
Payer: MEDICAID

## 2024-11-22 PROCEDURE — 97110 THERAPEUTIC EXERCISES: CPT | Performed by: PHYSICAL THERAPIST

## 2024-11-22 PROCEDURE — 97161 PT EVAL LOW COMPLEX 20 MIN: CPT | Performed by: PHYSICAL THERAPIST

## 2024-11-22 ASSESSMENT — PAIN DESCRIPTION - LOCATION: LOCATION: KNEE

## 2024-11-22 ASSESSMENT — PAIN SCALES - GENERAL: PAINLEVEL_OUTOF10: 7

## 2024-11-22 ASSESSMENT — PAIN DESCRIPTION - ORIENTATION: ORIENTATION: RIGHT;ANTERIOR;MID

## 2024-11-22 ASSESSMENT — PAIN DESCRIPTION - DESCRIPTORS: DESCRIPTORS: BURNING

## 2024-11-22 NOTE — PLAN OF CARE
restrictive AD to increase indep with ambulation in home and community    Frequency/Duration: 11/22/24 - 12/22/24  # Days per week: [] 1 day # Weeks: [] 1 week [] 5 weeks     [x] 2 days   [] 2 weeks [x] 6 weeks     [x] 3 days   [] 3 weeks [] 7 weeks     [] 4 days   [] 4 weeks [] 8 weeks    Rehab Potential: [] Excellent [x] Good [] Fair  [] Poor     Electronically signed by:  Alfonso Goldberg, PT, DPT    If you have any questions or concerns, please don't hesitate to call.  Thank you for your referral.      Physician Signature:________________________________Date:__________________  By signing above, therapist’s plan is approved by physician

## 2024-11-22 NOTE — TELEPHONE ENCOUNTER
Care Transitions Initial Follow Up Call    Outreach made within 2 business days of discharge: Yes    Patient: Sp Jimenez Patient : 1945   MRN: [id number]  Reason for Admission: Knee surgery   Discharge Date: 24       Spoke with: Maurizio for patient     Discharge department/facility: [facility]    Follow Up  Future Appointments   Date Time Provider Department Center   2024  9:30 AM Alfonso Goldberg, PT [facility] [place]   2024  9:00 AM Jessica Mccann PTA [facility] [place]   2024  8:20 AM Sg Maguire MD [facility]   2024  9:45 AM Karma Evangelista PTA [facility] [place]   2024  9:30 AM Max Mckeon, PT [facility] [place]   2024 10:30 AM Rm Cagle PTA [facility] [place]   2024 10:15 AM Karma Evangelista PTA [facility] [place]   12/10/2024  9:15 AM Williams Guerrero MD [facility]   2025  2:00 PM Sg Maguire MD [facility]       Dariana Larkin LPN

## 2024-11-22 NOTE — CARE COORDINATION
Care Transitions Note    Initial Call - Call within 2 business days of discharge: Yes    Patient Current Location:  Home: [facility] [address] [place] [id number]    Care Transition Nurse contacted the family, HIPAA sonTimmy  by telephone to perform post hospital discharge assessment, verified name and  as identifiers. Provided introduction to self, and explanation of the Care Transition Nurse role.     Patient: Sp Jimenez    Patient : 1945   MRN: [id number]    Reason for Admission: right knee total arthroplasty  Discharge Date: 24  RURS: No data recorded    Last Discharge Facility       Date Complaint Diagnosis Description Type Department Provider    24  Status post right knee replacement Admission (Discharged) Williams Freedman MD            Was this an external facility discharge? No    Additional needs identified to be addressed with provider   High priority: Unable to complete med rec. Patient's son will bring medications to office appt on              Method of communication with provider: chart routing.    Patients top risk factors for readmission: medical condition-open surgical wound    Interventions to address risk factors:   Education: post op care  Review of patient management of conditions/medications: symptoms, HFU appt, medications    Care Summary Note: HIPAA son Timmy reached for ROSETET call. States patient doing well, she has completed physical therapy evaluation, pain well controlled. Confirms AVS, confirms new flexeril and norco. He states is aware of stopped mediations and has reviewed with discharge staff. Patient schedued with Dr. Guerrero on 12/10, PCP HFU schedled for 24 @ 8:20 and Timmy updated on this. Attempted med rec, he is unable to complete full med rec. Reviewed aspirin 325 mg daily, states will have to check if correct strength, advised may purchase OTC, if needed. He is unable to confirm patient blood pressure medications. Reports is

## 2024-11-22 NOTE — FLOWSHEET NOTE
activities related to strengthening, flexibility, endurance, ROM. (28613)  [] Reviewed/Progressed HEP activities related to improving balance, coordination, kinesthetic sense, posture, motor skill, proprioception.  (55599)    Manual Treatments:    [] Provided manual therapy to mobilize soft tissue/joints for the purpose of modulating pain, promoting relaxation,  increasing ROM, reducing/eliminating soft tissue swelling/inflammation/restriction, improving soft tissue extensibility. (14184)    Service Based Modalities:      Timed Code Treatment Minutes:   15' there-ex    Total Treatment Minutes:  45'     Treatment/Activity Tolerance:  [x] Patient tolerated treatment well [] Patient limited by fatique  [] Patient limited by pain  [] Patient limited by other medical complications  [] Other:     Prognosis: [x] Good [] Fair  [] Poor    Patient Requires Follow-up: [x] Yes  [] No      Goals:  Short Term Goals  Time Frame for Short Term Goals: 3 weeks  Short Term Goal 1: Initiate HEP  Short Term Goal 2: Pt to have decreased Right knee pain to <4/10 for improved ease with ADL and ambulation  Short Term Goal 3: Pt to increase right knee AROM to WFL for improved ease with ADL and ambulation    Long Term Goals  Time Frame for Long Term Goals : 6 weeks  Long Term Goal 1: Indep with HEP  Long Term Goal 2: Pt to have decreased Right knee pain to 0/10 for improved ease with ADL and ambulation  Long Term Goal 3: Pt to increase right knee strength to WFL for improved ease with ADL and ambulation  Long Term Goal 4: Pt to ambulate community distances with least restrictive AD to increase indep with ambulation in home and community    Plan:   [] Continue per plan of care [] Alter current plan (see comments)  [x] Plan of care initiated [] Hold pending MD visit [] Discharge    Plan for Next Session:  Progress AROM, strength, and ambulation ease/indep as tolerated    Electronically signed by:  Alfonso Goldberg, PT, DPT

## 2024-11-22 NOTE — PROGRESS NOTES
Descriptors: Burning       Vision/Hearing:  Vision  Vision: Within Functional Limits  Hearing  Hearing: Within functional limits    Orientation:  Orientation  Overall Orientation Status: Within Normal Limits  Patient affect:: Normal  Follows Commands: Within Functional Limits    Social History:  Social History  Lives With: Family;Son  Type of Home: House  Home Layout: Two level;Able to Live on Main level with bedroom/bathroom  Home Access: Stairs to enter with rails  Entrance Stairs - Rails: Left  Entrance Stairs - Number of Steps: 4  Bathroom Shower/Tub: Walk-in shower  Bathroom Toilet: Standard  Bathroom Equipment: Toilet raiser;Grab bars in shower;Grab bars around toilet  Bathroom Accessibility: Walker accessible  Home Equipment: Walker - Rolling;Wheelchair - Manual;Cane    Functional Status:  Functional Status  Prior level of function: Independent  Current level of function: mod/max A  Occupation: Retired  Job Duties: Prolonged sitting;Sedentary (home tasks, cooking/cleaning)  Leisure & Hobbies: watch tv, play on phone  Receives Help From: Family  ADL Assistance: Independent  Homemaking Assistance: Independent  Homemaking Responsibilities: Yes  Meal Prep Responsibility: Secondary  Laundry Responsibility: Secondary  Cleaning Responsibility: Secondary  Shopping Responsibility: Secondary  Ambulation Assistance: Independent  Transfer Assistance: Independent  Active : No  Patient's  Info: son    Objective:     AROM RLE (degrees)  R Knee Flexion (0-145): 82  R Knee Extension (0): lacking 10 degrees from 0  AROM LLE (degrees)  LLE AROM : WFL    Strength RLE  Comment: grossly 3/5  Strength LLE  Strength LLE: WFL     Ambulation  Surface: Level tile  Device: Rolling Walker  Assistance: Modified Independent  Gait Deviations: Slow Kari;Increased ANSHU;Decreased step length;Decreased step height;Decreased arm swing;Decreased head and trunk rotation     Assessment:   Conditions Requiring Skilled Therapeutic

## 2024-11-25 ENCOUNTER — HOSPITAL ENCOUNTER (OUTPATIENT)
Dept: PHYSICAL THERAPY | Age: 79
Setting detail: THERAPIES SERIES
Discharge: HOME | End: 2024-11-25
Payer: MEDICAID

## 2024-11-25 PROCEDURE — 97110 THERAPEUTIC EXERCISES: CPT

## 2024-11-25 RX ORDER — MUPIROCIN 20 MG/G
OINTMENT TOPICAL
Qty: 22 G | Refills: 0 | Status: SHIPPED | OUTPATIENT
Start: 2024-11-25

## 2024-11-25 NOTE — FLOWSHEET NOTE
Activities:     [] Therapeutic activities, direct (one-on-one) patient contact (use of dynamic activities to improve functional performance). (59910)    Gait:   [] Provided training and instruction to the patient for ambulation re-education. (10394)    Self-Care/ADL's  [] Self-care/home management training and compensatory training, meal preparation, safety procedures, and instructions in use of assistive technology devices/adaptive equipment, direct one-on-one contact. (29908)    Home Exercise Program:     [x] Reviewed/Progressed HEP activities related to strengthening, flexibility, endurance, ROM. (83268)  [] Reviewed/Progressed HEP activities related to improving balance, coordination, kinesthetic sense, posture, motor skill, proprioception.  (79740)    Manual Treatments:    [] Provided manual therapy to mobilize soft tissue/joints for the purpose of modulating pain, promoting relaxation,  increasing ROM, reducing/eliminating soft tissue swelling/inflammation/restriction, improving soft tissue extensibility. (91890)    Service Based Modalities:      Timed Code Treatment Minutes:   26' there-ex    Total Treatment Minutes:  26'     Treatment/Activity Tolerance:  [x] Patient tolerated treatment well [] Patient limited by fatique  [] Patient limited by pain  [] Patient limited by other medical complications  [] Other:     Prognosis: [x] Good [] Fair  [] Poor    Patient Requires Follow-up: [x] Yes  [] No      Goals:  Short Term Goals  Time Frame for Short Term Goals: 3 weeks  Short Term Goal 1: Initiate HEP  Short Term Goal 2: Pt to have decreased Right knee pain to <4/10 for improved ease with ADL and ambulation  Short Term Goal 3: Pt to increase right knee AROM to WFL for improved ease with ADL and ambulation    Long Term Goals  Time Frame for Long Term Goals : 6 weeks  Long Term Goal 1: Indep with HEP  Long Term Goal 2: Pt to have decreased Right knee pain to 0/10 for improved ease with ADL and ambulation  Long

## 2024-11-26 ENCOUNTER — CARE COORDINATION (OUTPATIENT)
Dept: CARE COORDINATION | Age: 79
End: 2024-11-26

## 2024-11-26 ENCOUNTER — OFFICE VISIT (OUTPATIENT)
Dept: INTERNAL MEDICINE | Age: 79
End: 2024-11-26
Payer: MEDICAID

## 2024-11-26 VITALS
SYSTOLIC BLOOD PRESSURE: 128 MMHG | DIASTOLIC BLOOD PRESSURE: 78 MMHG | OXYGEN SATURATION: 97 % | HEIGHT: 63 IN | WEIGHT: 170 LBS | BODY MASS INDEX: 30.12 KG/M2 | HEART RATE: 93 BPM | RESPIRATION RATE: 16 BRPM

## 2024-11-26 DIAGNOSIS — Z09 HOSPITAL DISCHARGE FOLLOW-UP: Primary | ICD-10-CM

## 2024-11-26 PROCEDURE — 99212 OFFICE O/P EST SF 10 MIN: CPT | Performed by: INTERNAL MEDICINE

## 2024-11-26 RX ORDER — HYDROCHLOROTHIAZIDE 12.5 MG/1
12.5 CAPSULE ORAL EVERY MORNING
Qty: 90 CAPSULE | Refills: 3 | Status: SHIPPED | OUTPATIENT
Start: 2024-11-26

## 2024-11-26 RX ORDER — LOSARTAN POTASSIUM 50 MG/1
50 TABLET ORAL DAILY
Qty: 90 TABLET | Refills: 3 | Status: SHIPPED | OUTPATIENT
Start: 2024-11-26

## 2024-11-26 RX ORDER — MECLIZINE HYDROCHLORIDE 25 MG/1
25 TABLET ORAL 3 TIMES DAILY PRN
Qty: 90 TABLET | Refills: 3 | Status: SHIPPED | OUTPATIENT
Start: 2024-11-26

## 2024-11-26 RX ORDER — GUAIFENESIN 600 MG/1
600 TABLET, EXTENDED RELEASE ORAL 2 TIMES DAILY
Qty: 30 TABLET | Refills: 2 | Status: SHIPPED | OUTPATIENT
Start: 2024-11-26 | End: 2024-12-11

## 2024-11-26 NOTE — PROGRESS NOTES
Post-Discharge Transitional Care Follow Up      Sp Jimenez   YOB: 1945    Date of Office Visit:  11/26/2024  Date of Hospital Admission: 11/19/24  Date of Hospital Discharge: 11/21/24  Readmission Risk Score (high >=14%. Medium >=10%):No data recorded    Care management risk score Rising risk (score 2-5) and Complex Care (Scores >=6): No Risk Score On File     Non face to face  following discharge, date last encounter closed (first attempt may have been earlier): 11/22/2024     Call initiated 2 business days of discharge: Yes     Hospital discharge follow-up  -     IA DISCHARGE MEDS RECONCILED W/ CURRENT OUTPATIENT MED LIST    Medical Decision Making: moderate complexity  No follow-ups on file.           Subjective:   HPI    Inpatient course: Discharge summary reviewed- see chart.    Interval history/Current status: She presents to follow-up after right total knee replacement.  Says that her pain is little bit better, although she is still sore.  Says that she still has trouble moving.  She is undergoing physical therapy.  She has follow-up with the surgeon.  On exam, wound does not appear to be infected.  She says that she seems to have a lot of mucus, so I advised that we can try Mucinex and she agrees.  She denies fever, chills, shortness of breath at this time    Patient Active Problem List   Diagnosis    Chest pain    Hypertension    CKD (chronic kidney disease) stage 3, GFR 30-59 ml/min ([facility])    Nausea    Hypothyroidism    Varicose veins of left lower extremity with pain    Chronic neck pain    Vertigo    Positive Romberg test    Family history of tremor    Benign essential tremor    Anxiety and depression    Balance problem    Hyperglycemia    Bilateral carpal tunnel syndrome    Ulnar neuropathy of both upper extremities    Anxiety    GERD (gastroesophageal reflux disease)    Right lower quadrant abdominal pain    Dysuria    Productive cough    Chronic constipation    Osteopenia    Chronic

## 2024-11-26 NOTE — CARE COORDINATION
scheduled   Future Appointments         Provider Specialty Dept Phone    11/27/2024 9:45 AM Karma Evangelista PTA Physical Therapy [phone]    12/2/2024 9:30 AM Max Mckeon, PT Physical Therapy [phone]    12/4/2024 10:30 AM Rm Cagle PTA Physical Therapy [phone]    12/6/2024 10:15 AM Karma Evangelista PTA Physical Therapy [phone]    12/10/2024 9:15 AM Williams Guerrero MD Orthopedic Surgery [phone]    1/14/2025 2:00 PM Sg Maguire MD Internal Medicine [phone]            Care Transition Nurse provided contact information.  Plan for follow-up call in 6-10 days based on severity of symptoms and risk factors.  Plan for next call: symptom management-how is incision, how is PT going, pain controlled?      Cesia Hooks, MARILEE

## 2024-11-27 ENCOUNTER — HOSPITAL ENCOUNTER (OUTPATIENT)
Dept: PHYSICAL THERAPY | Age: 79
Setting detail: THERAPIES SERIES
Discharge: HOME | End: 2024-11-27
Payer: MEDICAID

## 2024-11-27 PROCEDURE — 97110 THERAPEUTIC EXERCISES: CPT

## 2024-11-27 NOTE — FLOWSHEET NOTE
Physical Therapy Daily Treatment Note    Date:  2024    Patient Name:  Sp Jimenez    :  1945  MRN: [id number]  Restrictions/Precautions:     Medical/Treatment Diagnosis Information:   Diagnosis: status post RIGHT total knee replacement  Treatment Diagnosis: right knee replacement  Insurance/Certification information:  PT Insurance Information: [employer]  Physician Information:   Melissa Rodriguez CNP  Plan of care signed (Y/N):  N  Visit# / total visits:  3/10  Pain level: 6-7/10       Time In:      9:42  Time Out:      10:12    Progress Note: []  Yes  [x]  No  Next due by: Visit #10  Or by 24    Subjective:   Pt reports 6-7/10 right knee pain at beginning of session. Pt's son present during treatment to translate for patient. Son states she takes pain medication when knee starts hurting. Was sore after last session. Is frequently icing and elevating. Reports compliance with HEP.    Objective:   There ex performed per flowsheet to improve R knee mobility and strength for improved gait and ability to complete ADL's. Verbal and tactile cueing for sequencing and proper form with ex. Added sit to stands and step taps to progress knee strength.     Observation:   Test measurements:  AROM 5-6 deg ext, 72 deg flexion      Exercises:   Exercise/Equipment Resistance/Repetitions Other comments   Nu-Step 5'    Counter Ex 10x HR/TR, HS Curls, 3 way hip, marches   Mini Squats 10x    Step Taps 10x4\"  Initiated    Sidesteps at bar 1x    Sit to stands 10x foam boost  Initiated   No UEs   Seated HS curls 15x red    LAQ 15x    Hip ABD/ADD 15x red     SLR 15x no assist   Heel Slides 15x    Quad Sets 3\" x 15    Hip ABD Slides 15x    Glute Sets 3\" x 15                   [x] Provided verbal/tactile cueing for activities related to strengthening, flexibility, endurance, ROM. (66452)  [] Provided verbal/tactile cueing for activities related to improving balance, coordination, kinesthetic sense, posture, motor

## 2024-12-02 ENCOUNTER — HOSPITAL ENCOUNTER (OUTPATIENT)
Dept: PHYSICAL THERAPY | Age: 79
Setting detail: THERAPIES SERIES
Discharge: HOME | End: 2024-12-02
Payer: MEDICAID

## 2024-12-02 PROCEDURE — 97110 THERAPEUTIC EXERCISES: CPT | Performed by: PHYSICAL THERAPIST

## 2024-12-02 NOTE — FLOWSHEET NOTE
Physical Therapy Daily Treatment Note    Date:  2024    Patient Name:  Sp Jimenez    :  1945  MRN: [id number]  Restrictions/Precautions:     Medical/Treatment Diagnosis Information:   Diagnosis: status post RIGHT total knee replacement  Treatment Diagnosis: right knee replacement  Insurance/Certification information:  PT Insurance Information: [employer]  Physician Information:   Melissa Rodriguez CNP  Plan of care signed (Y/N):  N  Visit# / total visits:  4/10  Pain level: 3-4/10       Time In:     9:25  Time Out:   10:00    Progress Note: []  Yes  [x]  No  Next due by: Visit #10  Or by 24    Subjective:  Walks around at home often.  Objective:       Observation:   Test measurements:    PROM -2 deg ext, 100 deg flexion in sitting      Exercises:   Exercise/Equipment Resistance/Repetitions Other comments   Nu-Step 5'    Counter Ex 15x HR/TR, HS Curls, 3 way hip, marches   Mini Squats 10x    Step Taps 10x4\"     Step up 4\"    Sidesteps at bar 1x    Sit to stands 10x    No UEs   Seated HS curls 15x red    LAQ 15x    Hip ABD/ADD 15x red    Step stretch 10x, 10\" At steps    SLR  no assist   Heel Slides     Quad Sets     Hip ABD Slides               ROM 4'         [x] Provided verbal/tactile cueing for activities related to strengthening, flexibility, endurance, ROM. (92238)  [] Provided verbal/tactile cueing for activities related to improving balance, coordination, kinesthetic sense, posture, motor skill, proprioception. (85758)    Therapeutic Activities:     [] Therapeutic activities, direct (one-on-one) patient contact (use of dynamic activities to improve functional performance). (47143)    Gait:   [] Provided training and instruction to the patient for ambulation re-education. (84869)    Self-Care/ADL's  [] Self-care/home management training and compensatory training, meal preparation, safety procedures, and instructions in use of assistive technology devices/adaptive equipment, direct

## 2024-12-04 ENCOUNTER — HOSPITAL ENCOUNTER (OUTPATIENT)
Dept: PHYSICAL THERAPY | Age: 79
Setting detail: THERAPIES SERIES
Discharge: HOME | End: 2024-12-04
Payer: MEDICAID

## 2024-12-04 PROCEDURE — 97110 THERAPEUTIC EXERCISES: CPT | Performed by: PHYSICAL THERAPY ASSISTANT

## 2024-12-04 NOTE — FLOWSHEET NOTE
Physical Therapy Daily Treatment Note    Date:  2024    Patient Name:  Sp Jimenez    :  1945  MRN: [id number]  Restrictions/Precautions:     Medical/Treatment Diagnosis Information:   Diagnosis: status post RIGHT total knee replacement  Treatment Diagnosis: right knee replacement  Insurance/Certification information:  PT Insurance Information: [employer]  Physician Information:   Melissa Rodriguez CNP  Plan of care signed (Y/N):  N  Visit# / total visits:  4/10  Pain level: 3-10       Time In:     1008  Time Out:   1050    Progress Note: []  Yes  [x]  No  Next due by: Visit #10  Or by 24    Subjective:  Accompanied by son this date. Patient pain rated 2-3/10 day.     Objective:   GOLDIE complete per flow chart to facilitate strength, motion and stability to allow ease with daily activities and ambulation. Verbal cuing for progression and technique with exercises. Discussed use of CP at home for 15-20 min at a time. Understanding noted. Advanced and progressed several exercises to improve motion and strength. Tightness and discomfort at end range of motion noted    Observation:   Test measurements:    PROM 0 deg ext, 90 deg flexion in supine      Exercises:   Exercise/Equipment Resistance/Repetitions Other comments   Nu-Step 5'    Counter Ex 15x HR/TR, HS Curls, 3 way hip, marches   Mini Squats 15x    Step Taps    Step up 4\"    Sidesteps at bar 1x    Sit to stands 10x    No UEs   Seated HS curls 15x red    LAQ 15x    Hip ABD/ADD 15x red    Step stretch 10x, 10\" At steps    SLR 10x no assist   Heel Slides 10x    Quad Sets 10x    Hip ABD Slides     SAQ 10x    Heel prop 2'    ROM 4'         [x] Provided verbal/tactile cueing for activities related to strengthening, flexibility, endurance, ROM. (89391)  [] Provided verbal/tactile cueing for activities related to improving balance, coordination, kinesthetic sense, posture, motor skill, proprioception. (94295)    Therapeutic Activities:     []

## 2024-12-05 ENCOUNTER — CARE COORDINATION (OUTPATIENT)
Dept: CARE COORDINATION | Age: 79
End: 2024-12-05

## 2024-12-05 NOTE — CARE COORDINATION
Care Transitions Note    Follow Up Call     Patient Current Location:  Home: [address] [place] [address]  [place] [address]    Care Transition Nurse contacted the family, HIPAA sonTimmy  by telephone. Verified name and  as identifiers.    Additional needs identified to be addressed with provider   No needs identified                 Method of communication with provider: none.    Care Summary Note: Patient sonTimmy reached for follow up. States patient is doing very well. She is able to participate in therapy as directed and walking well with walker. Notes pain controlled with current regimen. States incision is clean without concerns for infection. Confirms patient use of compression stockings. Post op appt is scheduled for .Reviewed need to contact surgery office for non-emergent concerns. Verbalizes understanding, denies further questions or concerns and agrees to follow up next week.     Plan of care updates since last contact:  Review of patient management of conditions/medications       Advance Care Planning:   Does patient have an Advance Directive: reviewed during previous call, see note. .    Medication Review:  Full medication reconciliation completed during previous call.    Remote Patient Monitoring:  Offered patient enrollment in the Remote Patient Monitoring (RPM) program for in-home monitoring: Yes, but did not enroll at this time: already monitoring with home equipment.    Assessments:  Care Transitions Subsequent and Final Call    Subsequent and Final Calls  Care Transitions Interventions  Other Interventions:              Follow Up Appointment:   Reviewed upcoming appointment(s). and ROSETTE appointment attended as scheduled   Future Appointments         Provider Specialty Dept Phone    2024 10:15 AM Karma Evangelista PTA Physical Therapy [phone]    2024 11:30 AM Rm Cagle PTA Physical Therapy [phone]    2024 10:00 AM Rm Cagle PTA Physical Therapy [phone]

## 2024-12-06 ENCOUNTER — HOSPITAL ENCOUNTER (OUTPATIENT)
Dept: PHYSICAL THERAPY | Age: 79
Setting detail: THERAPIES SERIES
Discharge: HOME | End: 2024-12-06
Payer: MEDICAID

## 2024-12-06 PROCEDURE — 97110 THERAPEUTIC EXERCISES: CPT

## 2024-12-06 NOTE — FLOWSHEET NOTE
Physical Therapy Daily Treatment Note    Date:  2024    Patient Name:  Sp Jimenez    :  1945  MRN: [id number]  Restrictions/Precautions:     Medical/Treatment Diagnosis Information:   Diagnosis: status post RIGHT total knee replacement  Treatment Diagnosis: right knee replacement  Insurance/Certification information:  PT Insurance Information: [employer]  Physician Information:   Melissa Rodriguez CNP  Plan of care signed (Y/N):  N  Visit# / total visits:  5/10  Pain level: 3-4/10       Time In:     1016  Time Out:   1058    Progress Note: []  Yes  [x]  No  Next due by: Visit #10  Or by 24    Subjective:  Accompanied by son this date. Patient states she is tired today. Rates knee pain 3-4/10 upon arrival. Son relates she is using CP intermittently throughout the day.     Objective:   GOLDIE complete per flow chart to facilitate strength, motion and stability to allow ease with daily activities and ambulation. Verbal cuing for progression and technique with exercises. Advanced and progressed several exercises to improve motion and strength. Tightness and discomfort at end range of motion noted.     Observation:   Test measurements:    PROM 2 deg ext, 95 deg flexion in supine      Exercises:   Exercise/Equipment Resistance/Repetitions Other comments   Nu-Step 5'    Counter Ex 15x HR/TR, HS Curls, 3 way hip, marches   Mini Squats 15x    Step Taps    Step up 10x4\" F, L   Initiated    Sidesteps at bar 2x    Sit to stands 10x    No UEs   Seated HS curls 15x red    LAQ 15x    Hip ABD/ADD 15x red    Step stretch 10x, 10\" At steps    SLR 10x no assist   Heel Slides 10x    Quad Sets 10x    Hip ABD Slides     SAQ 10x    Heel prop 2'    ROM 4'         [x] Provided verbal/tactile cueing for activities related to strengthening, flexibility, endurance, ROM. ([id number])  [] Provided verbal/tactile cueing for activities related to improving balance, coordination, kinesthetic sense, posture, motor skill,

## 2024-12-09 ENCOUNTER — HOSPITAL ENCOUNTER (OUTPATIENT)
Dept: PHYSICAL THERAPY | Age: 79
Setting detail: THERAPIES SERIES
Discharge: HOME | End: 2024-12-09
Payer: MEDICAID

## 2024-12-09 PROCEDURE — 97110 THERAPEUTIC EXERCISES: CPT | Performed by: PHYSICAL THERAPIST

## 2024-12-09 NOTE — FLOWSHEET NOTE
Physical Therapy Daily Treatment Note    Date:  2024    Patient Name:  Sp Jimenez    :  1945  MRN: [id number]  Restrictions/Precautions:     Medical/Treatment Diagnosis Information:   Diagnosis: status post RIGHT total knee replacement  Treatment Diagnosis: right knee replacement  Insurance/Certification information:  PT Insurance Information: [employer]  Physician Information:   Melissa Rodriguez CNP  Plan of care signed (Y/N):  N  Visit# / total visits:  6/10  Pain level: 3-4/10       Time In:   10:53  Time Out:   11:36    Progress Note: []  Yes  [x]  No  Next due by: Visit #10  Or by 24    Subjective:  Son present throughout duration of treatment and serves as . Per son: \"She's had some soreness in her foot the past 2-3 days. She has been walking without the walker at home for 2-3 days and decided to try walking without it to come here today.\"    Objective:   GOLDIE complete per flow chart to facilitate strength, motion and stability to allow ease with daily activities and ambulation. Verbal cuing for progression and technique with exercises. Advanced and progressed several exercises to improve motion and strength. Tightness and discomfort at end range of motion noted.   Discussed with patient that she has some mild edema in foot/ankle of R side and instructed her to use massage and a heat/cold contrast bath to help decrease pain and inflammation/edema    Observation: very stiff knee motions, both flexion and terminal extension this date  Test measurements:    PROM 8 deg ext, 97 deg flexion in supine        Exercises:   Exercise/Equipment Resistance/Repetitions Other comments   Nu-Step 5'    Counter Ex 20x HR/TR, HS Curls, 3 way hip, marches   Mini Squats 15x    Step Taps    Step up 10x4\" F, L   Initiated    Sidesteps at bar 2x    Sit to stands 10x    No UEs   Seated HS curls 15x red    LAQ 15x    Hip ABD/ADD 15x red    Step stretch 10x, 10\" At steps    SLR 10x no assist

## 2024-12-11 ENCOUNTER — HOSPITAL ENCOUNTER (OUTPATIENT)
Dept: PHYSICAL THERAPY | Age: 79
Setting detail: THERAPIES SERIES
Discharge: HOME | End: 2024-12-11
Payer: MEDICAID

## 2024-12-11 PROCEDURE — 97110 THERAPEUTIC EXERCISES: CPT | Performed by: PHYSICAL THERAPY ASSISTANT

## 2024-12-11 NOTE — FLOWSHEET NOTE
Physical Therapy Daily Treatment Note    Date:  2024    Patient Name:  Sp Jimenez    :  1945  MRN: [id number]  Restrictions/Precautions:     Medical/Treatment Diagnosis Information:   Diagnosis: status post RIGHT total knee replacement  Treatment Diagnosis: right knee replacement  Insurance/Certification information:  PT Insurance Information: [employer]  Physician Information:   Melissa Rodriguez CNP  Plan of care signed (Y/N):  N  Visit# / total visits:  8/10  Pain level: 0/10       Time In:   1000  Time Out: 1039    Progress Note: []  Yes  [x]  No  Next due by: Visit #10  Or by 24    Subjective:  Son present throughout duration of treatment and serves as  per request. Patient and son note some discomfort through right foot..     Objective:   GOLDIE complete per flow chart to facilitate strength, motion and stability to allow ease with daily activities and ambulation. Verbal cuing for progression and technique with exercises. Advanced and progressed several exercises to improve motion and strength. Tightness and discomfort at end range of motion noted. Educated patient and son in use of step stretch and strap stretch at home to improve knee motion, Understanding noted.     Observation: very stiff knee motions, both flexion and terminal extension this date  Test measurements:    PROM 6 deg ext, 103 deg flexion in supine        Exercises:   Exercise/Equipment Resistance/Repetitions Other comments   Nu-Step 6' L2   Counter Ex 20x HR/TR, HS Curls, 3 way hip, marches   Mini Squats 15x    Step Taps    Step up 10x4\" F, L   Initiated    Sidesteps at bar     Sit to stands 10x    No UEs   Seated HS curls 15x red    LAQ 20x    Hip ABD/ADD 20x red    Step stretch 10x, 10\" At steps    SLR 10x no assist   Heel Slides 10x    Quad Sets 10x    Hip ABD Slides     SAQ 10x    Heel prop 3'    ROM 4'         [x] Provided verbal/tactile cueing for activities related to strengthening, flexibility,

## 2024-12-12 ENCOUNTER — OFFICE VISIT (OUTPATIENT)
Dept: ORTHOPEDIC SURGERY | Age: 79
End: 2024-12-12
Payer: MEDICAID

## 2024-12-12 VITALS
WEIGHT: 170 LBS | HEART RATE: 72 BPM | DIASTOLIC BLOOD PRESSURE: 75 MMHG | SYSTOLIC BLOOD PRESSURE: 140 MMHG | HEIGHT: 63 IN | BODY MASS INDEX: 30.12 KG/M2

## 2024-12-12 DIAGNOSIS — Z96.651 STATUS POST TOTAL RIGHT KNEE REPLACEMENT: Primary | ICD-10-CM

## 2024-12-12 PROCEDURE — 99024 POSTOP FOLLOW-UP VISIT: CPT | Performed by: NURSE PRACTITIONER

## 2024-12-12 PROCEDURE — 99214 OFFICE O/P EST MOD 30 MIN: CPT | Performed by: NURSE PRACTITIONER

## 2024-12-12 RX ORDER — METHYLPREDNISOLONE 4 MG/1
TABLET ORAL
Qty: 1 KIT | Refills: 0 | Status: SHIPPED | OUTPATIENT
Start: 2024-12-12

## 2024-12-12 RX ORDER — HYDROCODONE BITARTRATE AND ACETAMINOPHEN 5; 325 MG/1; MG/1
1 TABLET ORAL EVERY 6 HOURS PRN
Qty: 28 TABLET | Refills: 0 | Status: SHIPPED | OUTPATIENT
Start: 2024-12-12 | End: 2024-12-19

## 2024-12-12 NOTE — PROGRESS NOTES
Intra-artICUlar Once Melissa Rodriguez APRN - CNP        triamcinolone acetonide (KENALOG-40) injection 40 mg  40 mg Intra-artICUlar Once Melissa Rodriguez APRN - CNP           Allergies:  Patient has no known allergies.    Social History:   Social History     Tobacco Use   Smoking Status Never   Smokeless Tobacco Never     Social History     Substance and Sexual Activity   Alcohol Use Not Currently     Social History     Substance and Sexual Activity   Drug Use No       Family History:  Family History   Problem Relation Age of Onset    Cataracts Neg Hx     Diabetes Neg Hx     Glaucoma Neg Hx        REVIEW OF SYSTEMS:  Constitutional: Denies any fever, chills.  Musculoskeletal: Positive for right knee pain, swelling.      PHYSICAL EXAM:  [unfilled]  General appearance:  Alert and oriented x 3. No apparent distress, appears stated age, calm and cooperative.   Musculoskeletal: Right knee was examined.  Surgical wound is well-healed.  Staples removed.  No redness cellulitis or drainage.  Slight warmth to palpation around the knee.  Mild swelling noted.  Knee flexion and extension is intact.  Denies calf pain to palpation.  Toe flexion and extension is intact.  Great toe joint red swollen and painful to touch pain with range of motion.  Sensation intact neurovascularly intact to the right foot..      DATA:  CBC:   Lab Results   Component Value Date/Time    WBC 7.6 11/21/2024 05:12 AM    HGB 8.2 11/21/2024 05:12 AM     11/21/2024 05:12 AM     BMP:    Lab Results   Component Value Date/Time     11/21/2024 05:12 AM    K 4.3 11/21/2024 05:12 AM     11/21/2024 05:12 AM    CO2 24 11/21/2024 05:12 AM    BUN 38 11/21/2024 05:12 AM    CREATININE 1.2 11/21/2024 05:12 AM    CALCIUM 8.0 11/21/2024 05:12 AM    GLUCOSE 96 11/21/2024 05:12 AM     PT/INR:    Lab Results   Component Value Date/Time    PROTIME 9.6 11/23/2015 11:18 AM    INR 0.9 11/23/2015 11:18 AM     Troponin:    Lab Results   Component

## 2024-12-12 NOTE — PROGRESS NOTES
I have reviewed and agree to the content of the note written by the PTA.  Electronically signed by Max Mckeon PT [id number]

## 2024-12-13 ENCOUNTER — APPOINTMENT (OUTPATIENT)
Dept: PHYSICAL THERAPY | Age: 79
End: 2024-12-13
Payer: MEDICAID

## 2024-12-13 ENCOUNTER — CARE COORDINATION (OUTPATIENT)
Dept: CARE COORDINATION | Age: 79
End: 2024-12-13

## 2024-12-13 NOTE — CARE COORDINATION
Care Transitions Note    Follow Up Call     Patient Current Location:  Home: [address]  [place] [address]    [facility] Care Coordinator contacted the family, son Timmy  by telephone. Verified name and  as identifiers.    Additional needs identified to be addressed with provider   No needs identified                 Method of communication with provider: none.    Care Summary Note: Writer spoke with patient's son Timmy for a follow up care transitions call. He states that she is doing very well with PT. She does still have some pain and swelling to her knee-they are continuing to ice and elevate PRN which helps. She had her post op appt yesterday-sutures were removed. He states she has developed right great toe pain swelling and redness-she has never had issues with gout before. Ortho prescribed a medrol dose pac whic son states she started this am. Ortho will see her again on 25 to re-evaluate her knee and toe. Discussed s/s with son that would warrant call to ortho for toe.He verbalized his understanding. He is thankful for the calls he has received and denies having any other needs or concerns at this time.    Plan of care updates since last contact:  Review of patient management of conditions/medications:         Advance Care Planning:   Does patient have an Advance Directive: reviewed during previous call, see note. .    Medication Review:  Medications changed since last call, reviewed today. Started on Medrol Dose Pac for toe pain and swelling.     Remote Patient Monitoring:  Offered patient enrollment in the Remote Patient Monitoring (RPM) program for in-home monitoring: Yes, but did not enroll at this time: already monitoring with home equipment.    Assessments:  Care Transitions Subsequent and Final Call    Subsequent and Final Calls  Do you have any ongoing symptoms?: No  Have your medications changed?: No  Do you have any questions related to your medications?: No  Do you currently have any

## 2024-12-16 ENCOUNTER — HOSPITAL ENCOUNTER (OUTPATIENT)
Dept: PHYSICAL THERAPY | Age: 79
Setting detail: THERAPIES SERIES
Discharge: HOME | End: 2024-12-16
Payer: MEDICAID

## 2024-12-16 PROCEDURE — 97110 THERAPEUTIC EXERCISES: CPT

## 2024-12-16 NOTE — FLOWSHEET NOTE
Physical Therapy Daily Treatment Note    Date:  2024    Patient Name:  Sp Jimenez    :  1945  MRN: [id number]  Restrictions/Precautions:     Medical/Treatment Diagnosis Information:   Diagnosis: status post RIGHT total knee replacement  Treatment Diagnosis: right knee replacement  Insurance/Certification information:  PT Insurance Information: [employer]  Physician Information:   Melissa Rodriguez CNP  Plan of care signed (Y/N):  N  Visit# / total visits:  9/10  Pain level: 0/10       Time In:   1036  Time Out: 1115    Progress Note: []  Yes  [x]  No  Next due by: Visit #10  Or by 24    Subjective:  Son present throughout duration of treatment and serves as  per request. Patient and son notes light pain and soreness after previous PT session. Son relates she has been practicing strap and step stretches to improve knee motion.     Objective:   GOLDIE complete per flow chart to facilitate strength, motion and stability to allow ease with daily activities and ambulation. Verbal cuing for progression and technique with exercises. Advanced and progressed several exercises to improve motion and strength. Tightness and discomfort at end range of motion noted. Educated patient and son in use of step stretch and strap stretch at home to improve knee motion, Understanding noted.     Observation: very stiff knee motions, both flexion and terminal extension this date  Test measurements:    PROM 6 deg ext, 103 deg flexion in supine        Exercises:   Exercise/Equipment Resistance/Repetitions Other comments   Nu-Step 6' L2   Counter Ex 20x HR/TR, HS Curls, 3 way hip, marches   Mini Squats 15x    Step Taps    Step up 10x6\" F, L   adv   Sidesteps at bar     Sit to stands 10x    No UEs   Seated HS curls 20x red    LAQ 20x    Hip ABD/ADD 20x red    Step stretch 10x, 10\" At steps    SLR 10x no assist   Heel Slides 10x    Quad Sets 10x    Hip ABD Slides     SAQ    Heel prop Held due to time    ROM

## 2024-12-18 ENCOUNTER — HOSPITAL ENCOUNTER (OUTPATIENT)
Dept: PHYSICAL THERAPY | Age: 79
Setting detail: THERAPIES SERIES
Discharge: HOME | End: 2024-12-18
Payer: MEDICAID

## 2024-12-18 PROCEDURE — 97110 THERAPEUTIC EXERCISES: CPT | Performed by: PHYSICAL THERAPY ASSISTANT

## 2024-12-18 NOTE — FLOWSHEET NOTE
Physical Therapy Daily Treatment Note    Date:  2024    Patient Name:  Sp Jimenez    :  1945  MRN: [id number]  Restrictions/Precautions:     Medical/Treatment Diagnosis Information:   Diagnosis: status post RIGHT total knee replacement  Treatment Diagnosis: right knee replacement  Insurance/Certification information:  PT Insurance Information: [employer]  Physician Information:   Melissa Rodriguez CNP  Plan of care signed (Y/N):  N  Visit# / total visits:  10/10  Pain level: 0/10       Time In:   1030  Time Out: 1058    Progress Note: []  Yes  [x]  No  Next due by: Visit #10  Or by 24    Subjective:  Son present throughout duration of treatment and serves as  per request. Patient and son notes light pain  after previous PT session. Son relates she has been performing some exercises at home but intermittently.     Objective:   Discussed need to perform HEP regularly to improve function. GOLDIE complete per flow chart to facilitate strength, motion and stability to allow ease with daily activities and ambulation. Verbal cuing for progression and technique with exercises. Advanced and progressed several exercises to improve motion and strength. Tightness and discomfort at end range of motion noted. Held several exercises due to time constraints.      Observation: very stiff knee motions, both flexion and terminal extension this date  Test measurements:    AROM 3 deg ext, 113 deg flexion in supine    Strength grossly 4-4+/5    Exercises:   Exercise/Equipment Resistance/Repetitions Other comments   Nu-Step 6' L2   Counter Ex 20x HR/TR, HS Curls, 3 way hip, marches   Mini Squats 10x ea 3-way   Step Taps    Step up 10x6\" F, L   adv   Sidesteps at bar     Sit to stands 10x    No UEs   Seated HS curls 20x red    LAQ     Hip ABD/ADD 20x red    Step stretch 10x, 10\" At steps    SLR  no assist   Heel Slides 10x    Quad Sets 10x    Hip ABD Slides     SAQ     Heel prop Held due to time    ROM

## 2024-12-18 NOTE — PLAN OF CARE
AROM 3 deg ext, 113 deg flexion in supine    Strength grossly 4-4+/5    Assessment:   Pt continues to lack full functional AROM and strength/endurance. Has decreased ease with ADL and long-duration ambulation at level of previous function    Plan:    Continue to progress strength, endurance, and ease with gait/ADL       Goals:   Short Term Goals  Time Frame for Short Term Goals: 3 weeks  Short Term Goal 1: Initiate HEP  Short Term Goal 2: Pt to have decreased Right knee pain to <4/10 for improved ease with ADL and ambulation  Short Term Goal 3: Pt to increase right knee AROM to WFL for improved ease with ADL and ambulation (See above)     Long Term Goals  Time Frame for Long Term Goals : 6 weeks  Long Term Goal 1: Indep with HEP  Long Term Goal 2: Pt to have decreased Right knee pain to 0/10 for improved ease with ADL and ambulationv(0-2/10)  Long Term Goal 3: Pt to increase right knee strength to WFL for improved ease with ADL and ambulation (4-4+/5)  Long Term Goal 4: Pt to ambulate community distances with least restrictive AD to increase indep with ambulation in home and community (Able to walk community distance with intermittent assistance.)       Current Frequency/Duration: 12/18/24 - 1/18/25  # Days per week: [] 1 day # Weeks: [] 1 week [x] 4 weeks      [x] 2 days   [] 2 weeks [] 5 weeks      [] 3 days   [] 3 weeks [] 6 weeks     Rehab Potential: [] Excellent [x] Good [] Fair  [] Poor     Goal Status:  [] Achieved [x] Partially Achieved / in progress  [] Not Achieved     Patient Status: [] Continue per initial plan of Care     [] Patient now discharged     [x] Additional visits requested, Please re-certify for additional visits:      Requested frequency/duration:  2X/week for 4 weeks    Electronically signed by:  Alfonso Goldberg, PT, DPT    If you have any questions or concerns, please don't hesitate to call.  Thank you for your referral.    Physician

## 2024-12-19 ENCOUNTER — CARE COORDINATION (OUTPATIENT)
Dept: CARE COORDINATION | Age: 79
End: 2024-12-19

## 2024-12-19 NOTE — CARE COORDINATION
Care Transitions Note    Final Call     Patient Current Location:  Home: [facility] [address] [place] [phone]    Care Transition Nurse contacted the family, HIPAA son, Timmy,  by telephone. Verified name and  as identifiers.    Patient graduated from the Care Transitions program on 24.  Patient/family verbalizes confidence in the ability to self-manage at this time. has the ability to self manage at this time..      Advance Care Planning:   Does patient have an Advance Directive: reviewed during previous call, see note. .    Handoff:   Patient was not referred to the ACM team due to no additional needs identified.       Care Summary Note: Reached patient's son for final call. Reports patient doing very well, continues to participate in physical therapy and notes overall improvement is ongoing. He states gratitude for good care she is receiving, verbal support offered. States confidence in continuing to care for patient needs, agrees to graduation.     Assessments:  Care Transitions Subsequent and Final Call    Schedule Follow Up Appointment with PCP: Completed  Subsequent and Final Calls  Do you have any ongoing symptoms?: No  Have your medications changed?: No  Do you have any questions related to your medications?: No  Do you currently have any active services?: No  Do you have any needs or concerns that I can assist you with?: No  Identified Barriers: None  Care Transitions Interventions  Other Interventions:              Upcoming Appointments:    Future Appointments         Provider Specialty Dept Phone    2024 10:45 AM Rm Cagle PTA Physical Therapy [phone]    2024 1:30 PM Rm Cagle PTA Physical Therapy [phone]    2025 2:00 PM Karma Evangelista PTA Physical Therapy [phone]    1/3/2025 1:45 PM Alfonso Goldberg PT Physical Therapy [phone]    2025 2:00 PM Karma Evangelista PTA Physical Therapy [phone]    2025 2:00 PM Karma Evangelista PTA

## 2024-12-20 ENCOUNTER — APPOINTMENT (OUTPATIENT)
Dept: PHYSICAL THERAPY | Age: 79
End: 2024-12-20
Payer: MEDICAID

## 2024-12-20 ENCOUNTER — HOSPITAL ENCOUNTER (OUTPATIENT)
Dept: PHYSICAL THERAPY | Age: 79
Setting detail: THERAPIES SERIES
Discharge: HOME | End: 2024-12-20
Payer: MEDICAID

## 2024-12-20 PROCEDURE — 97110 THERAPEUTIC EXERCISES: CPT

## 2024-12-20 NOTE — FLOWSHEET NOTE
Physical Therapy Daily Treatment Note    Date:  2024    Patient Name:  Sp Jimenez    :  1945  MRN: [id number]  Restrictions/Precautions:     Medical/Treatment Diagnosis Information:   Diagnosis: status post RIGHT total knee replacement  Treatment Diagnosis: right knee replacement  Insurance/Certification information:  PT Insurance Information: [employer]  Physician Information:   Melissa Rodriguez CNP  Plan of care signed (Y/N):  N  Visit# / total visits:   2nd POC 11 total  Pain level: 0/10       Time In:     9:21  Time Out:       9:50    Progress Note: []  Yes  [x]  No  Next due by: Visit #8  Or by 25    Subjective:  Son present throughout duration of treatment and serves as  per request. Son reports pt has not complained of much pain the last few days. She was a little sore after last therapy session. Son reports pt is completing HEP.    Objective:  GOLDIE complete per flow chart to facilitate strength, motion and stability to allow ease with daily activities and ambulation. Verbal cuing for progression and technique with exercises. Tightness and discomfort at end range of motion noted. Held several exercises due to time constraints.      Observation:   Test measurements:    AROM 2 deg ext, 116 deg flexion in supine    Strength grossly 4-4+/5    Exercises:   Exercise/Equipment Resistance/Repetitions Other comments   Nu-Step 6' L2   Counter Ex 20x HR/TR, HS Curls, 3 way hip, marches   Mini Squats 10x ea 3-way   Step Taps    Step up 10x6\" F, L    Sidesteps at bar     Sit to stands 10x  No UEs   Seated HS curls 20x red    LAQ     Hip ABD/ADD 20x red    Step stretch 10x, 10\" At steps    SLR  no assist   Heel Slides 10x    Quad Sets 10x    Hip ABD Slides     SAQ     Heel prop Held due to time    ROM 10x10'' Knee extension        [x] Provided verbal/tactile cueing for activities related to strengthening, flexibility, endurance, ROM. (16936)  [] Provided verbal/tactile cueing for

## 2024-12-23 ENCOUNTER — APPOINTMENT (OUTPATIENT)
Dept: PHYSICAL THERAPY | Age: 79
End: 2024-12-23
Payer: MEDICAID

## 2024-12-26 ENCOUNTER — APPOINTMENT (OUTPATIENT)
Dept: PHYSICAL THERAPY | Age: 79
End: 2024-12-26
Payer: MEDICAID

## 2024-12-27 ENCOUNTER — APPOINTMENT (OUTPATIENT)
Dept: PHYSICAL THERAPY | Age: 79
End: 2024-12-27
Payer: MEDICAID

## 2024-12-30 ENCOUNTER — APPOINTMENT (OUTPATIENT)
Dept: PHYSICAL THERAPY | Age: 79
End: 2024-12-30
Payer: MEDICAID

## 2025-01-02 ENCOUNTER — HOSPITAL ENCOUNTER (OUTPATIENT)
Dept: PHYSICAL THERAPY | Age: 80
Setting detail: THERAPIES SERIES
Discharge: HOME | End: 2025-01-02
Payer: MEDICAID

## 2025-01-02 PROCEDURE — 97110 THERAPEUTIC EXERCISES: CPT

## 2025-01-02 RX ORDER — TRIAMTERENE AND HYDROCHLOROTHIAZIDE 37.5; 25 MG/1; MG/1
1 TABLET ORAL DAILY
Qty: 90 TABLET | Refills: 3 | Status: SHIPPED | OUTPATIENT
Start: 2025-01-02

## 2025-01-02 RX ORDER — DIAPER,BRIEF,INFANT-TODD,DISP
EACH MISCELLANEOUS 2 TIMES DAILY
Qty: 30 G | Refills: 1 | Status: SHIPPED | OUTPATIENT
Start: 2025-01-02

## 2025-01-02 RX ORDER — ASPIRIN 325 MG
325 TABLET ORAL DAILY
Qty: 90 TABLET | Refills: 3 | Status: SHIPPED | OUTPATIENT
Start: 2025-01-02 | End: 2025-12-28

## 2025-01-02 RX ORDER — PROPRANOLOL HYDROCHLORIDE 80 MG/1
80 CAPSULE, EXTENDED RELEASE ORAL DAILY
Qty: 90 CAPSULE | Refills: 3 | Status: SHIPPED | OUTPATIENT
Start: 2025-01-02

## 2025-01-02 RX ORDER — LEVOTHYROXINE SODIUM 100 UG/1
100 TABLET ORAL DAILY
Qty: 90 TABLET | Refills: 3 | Status: SHIPPED | OUTPATIENT
Start: 2025-01-02 | End: 2025-09-29

## 2025-01-02 NOTE — FLOWSHEET NOTE
Term Goal 2: Pt to have decreased Right knee pain to 0/10 for improved ease with ADL and ambulationv(0-2/10)  Long Term Goal 3: Pt to increase right knee strength to WFL for improved ease with ADL and ambulation (4-4+/5)  Long Term Goal 4: Pt to ambulate community distances with least restrictive AD to increase indep with ambulation in home and community (Able to walk community distance with intermittent assistance.)    Plan:   [x] Continue per plan of care [] Alter current plan (see comments)  [] Plan of care initiated [] Hold pending MD visit [] Discharge    Plan for Next Session:  Progress AROM, strength, and ambulation ease/indep as tolerated    Electronically signed by:  EZEKIEL BLISS PTA

## 2025-01-03 ENCOUNTER — HOSPITAL ENCOUNTER (OUTPATIENT)
Dept: PHYSICAL THERAPY | Age: 80
Setting detail: THERAPIES SERIES
Discharge: HOME | End: 2025-01-03
Payer: MEDICAID

## 2025-01-03 PROCEDURE — 97110 THERAPEUTIC EXERCISES: CPT | Performed by: PHYSICAL THERAPIST

## 2025-01-03 NOTE — FLOWSHEET NOTE
Physical Therapy Daily Treatment Note    Date:  1/3/2025    Patient Name:  Sp Jimenez    :  1945  MRN: [id number]  Restrictions/Precautions:     Medical/Treatment Diagnosis Information:   Diagnosis: status post RIGHT total knee replacement  Treatment Diagnosis: right knee replacement  Insurance/Certification information:  PT Insurance Information: [employer]  Physician Information:   Melissa Rodriguez CNP  Plan of care signed (Y/N):  N  Visit# / total visits:  3 2nd POC 13 total  Pain level: 0/10       Time In:     1:50  Time Out:   2:32    Progress Note: []  Yes  [x]  No  Next due by: Visit #8  Or by 25    Subjective:  Son present throughout duration of treatment and serves as  per request. Son reports pt's pain is minimal. Discussed home HEP options to continue working on full AROM and PROM     Objective:  GOLDIE complete per flow chart to facilitate strength, motion and stability to allow ease with daily activities and ambulation. Verbal cuing for progression and technique with exercises. Tightness and discomfort at end range of motion noted. Making progress with ROM and strength, though still needing a few weeks for functional strengthening and endurance    Observation:   Test measurements:    AROM 2 deg ext, 117 deg flexion in supine    Strength grossly 4-4+/5    Exercises:   Exercise/Equipment Resistance/Repetitions Other comments   Nu-Step 6' L2   Counter Ex 20x HR/TR, HS Curls, 3 way hip, marches   Mini Squats 10x ea 3-way   Step Taps    Step up 20x6\" F, L    Sidesteps at bar     Sit to stands 10x  No UEs   Seated HS curls 15x green    LAQ     Hip ABD/ADD 15x green, 20x ball     Step stretch 10x, 10\" At steps   Hurdles Add next visit    Prone hang knee ext Add next visit     SLR 20x no assist   Heel Slides 10x    Quad Sets 10x    Hip ABD Slides     SAQ     Heel prop Held due to time    ROM 10x10'' Knee extension        [x] Provided verbal/tactile cueing for activities related

## 2025-01-06 ENCOUNTER — HOSPITAL ENCOUNTER (OUTPATIENT)
Dept: PHYSICAL THERAPY | Age: 80
Setting detail: THERAPIES SERIES
Discharge: HOME | End: 2025-01-06
Payer: MEDICAID

## 2025-01-06 PROCEDURE — 97110 THERAPEUTIC EXERCISES: CPT

## 2025-01-06 NOTE — FLOWSHEET NOTE
Physical Therapy Daily Treatment Note    Date:  2025    Patient Name:  Sp Jimenez    :  1945  MRN: [id number]  Restrictions/Precautions:     Medical/Treatment Diagnosis Information:   Diagnosis: status post RIGHT total knee replacement  Treatment Diagnosis: right knee replacement  Insurance/Certification information:  PT Insurance Information: [employer]  Physician Information:   Melissa Rodriguez CNP  Plan of care signed (Y/N):  N  Visit# / total visits:  4/8 2nd POC 13 total  Pain level: 0/10       Time In:     4:55  Time Out:   5:38    Progress Note: []  Yes  [x]  No  Next due by: Visit #8  Or by 25    Subjective:  Son present throughout duration of treatment and serves as  per request. Son reports pt's pain remains minimal.     Objective:  GOLDIE complete per flow chart to facilitate strength, motion and stability to allow ease with daily activities and ambulation. Added hurdles and prone hang to improve strength and ROM. Verbal cuing for progression and technique with exercises. Tightness and discomfort at end range of motion noted. Making progress with ROM and strength, though still needing a few weeks for functional strengthening and endurance.     Observation:   Test measurements:    AROM 2 deg ext, 118 deg flexion in supine    Strength grossly 4-4+/5    Exercises:   Exercise/Equipment Resistance/Repetitions Other comments   Nu-Step 6' L2   Counter Ex 20x HR/TR, HS Curls, 3 way hip, marches   Mini Squats 10x ea 3-way   Step Taps    Step up 10x8\" F, L   adv   Sidesteps at bar     Sit to stands 10x  No UEs   Seated HS curls 20x green    LAQ     Hip ABD/ADD 20x green, 20x ball     Step stretch 10x, 10\" At steps   Hurdles 2x  step through, lateral   Initiated    Prone hang knee ext 2'  Initiated     SLR 20x no assist   Heel Slides 10x    Quad Sets 10x    Hip ABD Slides     SAQ     Heel prop Held due to time    ROM 10x10'' Knee extension        [x] Provided verbal/tactile cueing

## 2025-01-07 RX ORDER — ESCITALOPRAM OXALATE 20 MG/1
TABLET ORAL
Qty: 90 TABLET | Refills: 0 | Status: SHIPPED | OUTPATIENT
Start: 2025-01-07

## 2025-01-08 ENCOUNTER — HOSPITAL ENCOUNTER (OUTPATIENT)
Dept: PHYSICAL THERAPY | Age: 80
Setting detail: THERAPIES SERIES
Discharge: HOME | End: 2025-01-08
Payer: MEDICAID

## 2025-01-08 PROCEDURE — 97110 THERAPEUTIC EXERCISES: CPT

## 2025-01-08 NOTE — FLOWSHEET NOTE
time    ROM 10x10'' Knee extension        [x] Provided verbal/tactile cueing for activities related to strengthening, flexibility, endurance, ROM. (73055)  [] Provided verbal/tactile cueing for activities related to improving balance, coordination, kinesthetic sense, posture, motor skill, proprioception. (93119)    Therapeutic Activities:     [] Therapeutic activities, direct (one-on-one) patient contact (use of dynamic activities to improve functional performance). (82936)    Gait:   [] Provided training and instruction to the patient for ambulation re-education. (84734)    Self-Care/ADL's  [] Self-care/home management training and compensatory training, meal preparation, safety procedures, and instructions in use of assistive technology devices/adaptive equipment, direct one-on-one contact. (30480)    Home Exercise Program:     [x] Reviewed/Progressed HEP activities related to strengthening, flexibility, endurance, ROM. (15976)  [] Reviewed/Progressed HEP activities related to improving balance, coordination, kinesthetic sense, posture, motor skill, proprioception.  (03912)    Manual Treatments:    [] Provided manual therapy to mobilize soft tissue/joints for the purpose of modulating pain, promoting relaxation,  increasing ROM, reducing/eliminating soft tissue swelling/inflammation/restriction, improving soft tissue extensibility. (64451)    Service Based Modalities:      Timed Code Treatment Minutes:   48' there-ex    Total Treatment Minutes:   48'    Treatment/Activity Tolerance:  [x] Patient tolerated treatment well [] Patient limited by fatique  [] Patient limited by pain  [] Patient limited by other medical complications  [] Other:     Prognosis: [x] Good [] Fair  [] Poor    Patient Requires Follow-up: [x] Yes  [] No      Goals:  Short Term Goals  Time Frame for Short Term Goals: 3 weeks  Short Term Goal 1: Initiate HEP  Short Term Goal 2: Pt to have decreased Right knee pain to <4/10 for improved ease with

## 2025-01-09 ENCOUNTER — OFFICE VISIT (OUTPATIENT)
Dept: ORTHOPEDIC SURGERY | Age: 80
End: 2025-01-09
Payer: MEDICAID

## 2025-01-09 VITALS
DIASTOLIC BLOOD PRESSURE: 72 MMHG | WEIGHT: 170 LBS | BODY MASS INDEX: 30.12 KG/M2 | HEIGHT: 63 IN | SYSTOLIC BLOOD PRESSURE: 124 MMHG

## 2025-01-09 DIAGNOSIS — M65.321 TRIGGER FINGER, RIGHT INDEX FINGER: Primary | ICD-10-CM

## 2025-01-09 DIAGNOSIS — Z96.651 STATUS POST TOTAL RIGHT KNEE REPLACEMENT: ICD-10-CM

## 2025-01-09 PROCEDURE — 99214 OFFICE O/P EST MOD 30 MIN: CPT | Performed by: NURSE PRACTITIONER

## 2025-01-09 PROCEDURE — 20600 DRAIN/INJ JOINT/BURSA W/O US: CPT | Performed by: NURSE PRACTITIONER

## 2025-01-09 RX ORDER — TRIAMCINOLONE ACETONIDE 40 MG/ML
20 INJECTION, SUSPENSION INTRA-ARTICULAR; INTRAMUSCULAR ONCE
Status: COMPLETED | OUTPATIENT
Start: 2025-01-09 | End: 2025-01-09

## 2025-01-09 RX ORDER — BUPIVACAINE HYDROCHLORIDE 5 MG/ML
0.5 INJECTION, SOLUTION PERINEURAL ONCE
Status: COMPLETED | OUTPATIENT
Start: 2025-01-09 | End: 2025-01-09

## 2025-01-09 RX ADMIN — BUPIVACAINE HYDROCHLORIDE 2.5 MG: 5 INJECTION, SOLUTION PERINEURAL at 14:06

## 2025-01-09 RX ADMIN — TRIAMCINOLONE ACETONIDE 20 MG: 200 INJECTION, SUSPENSION INTRA-ARTICULAR; INTRAMUSCULAR at 14:07

## 2025-01-09 NOTE — PROGRESS NOTES
for input(s): \"LIPASE\", \"AMYLASE\" in the last 72 hours.  No results for input(s): \"AST\", \"ALT\", \"BILIDIR\", \"BILITOT\", \"ALKPHOS\" in the last 72 hours.  Uric Acid:  No components found for: \"URIC\"  Urine Culture:  No components found for: \"CURINE\"    Radiology:   No results found.       Diagnosis Orders   1. Trigger finger, right index finger  triamcinolone acetonide (KENALOG-40) injection 20 mg    BUPivacaine (MARCAINE) 0.5 % injection 2.5 mg    DRAIN/INJECT SMALL JOINT/BURSA      2. Status post total right knee replacement              PLAN:  Plan at this time continue with therapy to the right knee.  Will see patient back as needed for any further issues.  She would like to proceed with a right index finger A1 pulley corticosteroid injection for trigger finger today.  Will see her back as needed for any further issues.    No orders of the defined types were placed in this encounter.       Procedure: The right hand was prepped in sterile fashion with alcohol.  A 22-gauge needle was introduced into the right index finger A1 pulley with half an mL of half percent Marcaine and 20 mg of Kenalog were injected.  Hemostasis achieved.  Dry sterile bandage applied.  Patient tolerated procedure well.      Electronically signed by BRITTANY Maurer CNP on 1/9/2025 at 12:46 PM

## 2025-01-10 ENCOUNTER — HOSPITAL ENCOUNTER (OUTPATIENT)
Dept: PHYSICAL THERAPY | Age: 80
Setting detail: THERAPIES SERIES
Discharge: HOME | End: 2025-01-10
Payer: MEDICAID

## 2025-01-10 PROCEDURE — 97110 THERAPEUTIC EXERCISES: CPT | Performed by: PHYSICAL THERAPIST

## 2025-01-10 NOTE — FLOWSHEET NOTE
Slides     SAQ     Heel prop Held due to time    ROM 10x10'' Knee extension        [x] Provided verbal/tactile cueing for activities related to strengthening, flexibility, endurance, ROM. (69157)  [] Provided verbal/tactile cueing for activities related to improving balance, coordination, kinesthetic sense, posture, motor skill, proprioception. (81531)    Therapeutic Activities:     [] Therapeutic activities, direct (one-on-one) patient contact (use of dynamic activities to improve functional performance). (76881)    Gait:   [] Provided training and instruction to the patient for ambulation re-education. (12121)    Self-Care/ADL's  [] Self-care/home management training and compensatory training, meal preparation, safety procedures, and instructions in use of assistive technology devices/adaptive equipment, direct one-on-one contact. (14527)    Home Exercise Program:     [x] Reviewed/Progressed HEP activities related to strengthening, flexibility, endurance, ROM. (30926)  [] Reviewed/Progressed HEP activities related to improving balance, coordination, kinesthetic sense, posture, motor skill, proprioception.  (91561)    Manual Treatments:    [] Provided manual therapy to mobilize soft tissue/joints for the purpose of modulating pain, promoting relaxation,  increasing ROM, reducing/eliminating soft tissue swelling/inflammation/restriction, improving soft tissue extensibility. (44965)    Service Based Modalities:      Timed Code Treatment Minutes:   41' there-ex    Total Treatment Minutes:   41'    Treatment/Activity Tolerance:  [x] Patient tolerated treatment well [] Patient limited by fatique  [] Patient limited by pain  [] Patient limited by other medical complications  [] Other:     Prognosis: [x] Good [] Fair  [] Poor    Patient Requires Follow-up: [x] Yes  [] No      Goals:  Short Term Goals  Time Frame for Short Term Goals: 3 weeks  Short Term Goal 1: Initiate HEP  Short Term Goal 2: Pt to have decreased Right

## 2025-01-13 ENCOUNTER — HOSPITAL ENCOUNTER (OUTPATIENT)
Dept: PHYSICAL THERAPY | Age: 80
Setting detail: THERAPIES SERIES
Discharge: HOME | End: 2025-01-13
Payer: MEDICAID

## 2025-01-13 PROCEDURE — 97110 THERAPEUTIC EXERCISES: CPT

## 2025-01-13 NOTE — FLOWSHEET NOTE
Physical Therapy Daily Treatment Note    Date:  2025    Patient Name:  Sp Jimenez    :  1945  MRN: [id number]  Restrictions/Precautions:     Medical/Treatment Diagnosis Information:   Diagnosis: status post RIGHT total knee replacement  Treatment Diagnosis: right knee replacement  Insurance/Certification information:  PT Insurance Information: [employer]  Physician Information:   Melissa Rodriguez CNP  Plan of care signed (Y/N):  N  Visit# / total visits:   2nd POC 16 total  Pain level: 0/10       Time In:     1:57  Time Out:  2:36    Progress Note: []  Yes  [x]  No  Next due by: Visit #8  Or by 25    Subjective:  Son present throughout duration of treatment and serves as  per request. Per son: \"She has been working on bending knee more at home. Overall she is happy with progress. No pain with any movements or activities.\"     Objective:  GOLDIE complete per flow chart to facilitate strength, motion and stability to allow ease with daily activities and ambulation. Added weight to standing ex to progress strength and ROM. Verbal cuing for progression and technique with exercises. Tightness and discomfort at end range of motion noted. Making progress with ROM and strength, however still needing 1 more week for functional strengthening and endurance.     Observation:   Test measurements:    AROM 0 deg ext, 115 deg flexion in supine    Strength grossly 4-4+/5    Exercises:   Exercise/Equipment Resistance/Repetitions Other comments   Nu-Step 6' L2   Counter Ex 15x 2#  HR/TR, HS Curls, marches  adv   3 way hip 15x 2#    Mini Squats 15x ea 3-way       Step up 10x8\" F, L    Sidesteps at bar 3 laps    Sit to stands 15x  No UEs   Seated HS curls 20x green    LAQ     Hip ABD/ADD 10x blue, 20x ball     Step stretch 10x, 10\" At steps   Hurdles 2x ea Step to, step through, lateral   Prone hang knee ext      SLR 20x no assist   Heel Slides 10x    Quad Sets 10x    Hip ABD Slides     SAQ

## 2025-01-14 ENCOUNTER — OFFICE VISIT (OUTPATIENT)
Dept: INTERNAL MEDICINE | Age: 80
End: 2025-01-14
Payer: MEDICAID

## 2025-01-14 VITALS
RESPIRATION RATE: 16 BRPM | HEART RATE: 89 BPM | HEIGHT: 63 IN | OXYGEN SATURATION: 98 % | WEIGHT: 159 LBS | SYSTOLIC BLOOD PRESSURE: 130 MMHG | BODY MASS INDEX: 28.17 KG/M2 | DIASTOLIC BLOOD PRESSURE: 80 MMHG

## 2025-01-14 DIAGNOSIS — I25.10 CORONARY ARTERY DISEASE WITHOUT ANGINA PECTORIS, UNSPECIFIED VESSEL OR LESION TYPE, UNSPECIFIED WHETHER NATIVE OR TRANSPLANTED HEART: ICD-10-CM

## 2025-01-14 DIAGNOSIS — I10 HYPERTENSION, UNSPECIFIED TYPE: ICD-10-CM

## 2025-01-14 DIAGNOSIS — R05.9 COUGH, UNSPECIFIED TYPE: Primary | ICD-10-CM

## 2025-01-14 DIAGNOSIS — J45.909 UNCOMPLICATED ASTHMA, UNSPECIFIED ASTHMA SEVERITY, UNSPECIFIED WHETHER PERSISTENT: ICD-10-CM

## 2025-01-14 DIAGNOSIS — E03.9 HYPOTHYROIDISM, UNSPECIFIED TYPE: ICD-10-CM

## 2025-01-14 DIAGNOSIS — R73.01 IFG (IMPAIRED FASTING GLUCOSE): ICD-10-CM

## 2025-01-14 PROCEDURE — G8417 CALC BMI ABV UP PARAM F/U: HCPCS | Performed by: INTERNAL MEDICINE

## 2025-01-14 PROCEDURE — 3079F DIAST BP 80-89 MM HG: CPT | Performed by: INTERNAL MEDICINE

## 2025-01-14 PROCEDURE — G8399 PT W/DXA RESULTS DOCUMENT: HCPCS | Performed by: INTERNAL MEDICINE

## 2025-01-14 PROCEDURE — 99214 OFFICE O/P EST MOD 30 MIN: CPT | Performed by: INTERNAL MEDICINE

## 2025-01-14 PROCEDURE — 1036F TOBACCO NON-USER: CPT | Performed by: INTERNAL MEDICINE

## 2025-01-14 PROCEDURE — G2211 COMPLEX E/M VISIT ADD ON: HCPCS | Performed by: INTERNAL MEDICINE

## 2025-01-14 PROCEDURE — G8427 DOCREV CUR MEDS BY ELIG CLIN: HCPCS | Performed by: INTERNAL MEDICINE

## 2025-01-14 PROCEDURE — 3075F SYST BP GE 130 - 139MM HG: CPT | Performed by: INTERNAL MEDICINE

## 2025-01-14 PROCEDURE — 1090F PRES/ABSN URINE INCON ASSESS: CPT | Performed by: INTERNAL MEDICINE

## 2025-01-14 PROCEDURE — 99212 OFFICE O/P EST SF 10 MIN: CPT | Performed by: INTERNAL MEDICINE

## 2025-01-14 PROCEDURE — 1123F ACP DISCUSS/DSCN MKR DOCD: CPT | Performed by: INTERNAL MEDICINE

## 2025-01-14 RX ORDER — AZITHROMYCIN 250 MG/1
TABLET, FILM COATED ORAL
Qty: 6 TABLET | Refills: 0 | Status: SHIPPED | OUTPATIENT
Start: 2025-01-14 | End: 2025-01-24

## 2025-01-14 RX ORDER — GUAIFENESIN 600 MG/1
600 TABLET, EXTENDED RELEASE ORAL 2 TIMES DAILY
Qty: 30 TABLET | Refills: 0 | Status: SHIPPED | OUTPATIENT
Start: 2025-01-14 | End: 2025-01-29

## 2025-01-14 RX ORDER — CODEINE PHOSPHATE AND GUAIFENESIN 10; 100 MG/5ML; MG/5ML
5 SOLUTION ORAL 4 TIMES DAILY PRN
Qty: 473 ML | Refills: 0 | Status: SHIPPED | OUTPATIENT
Start: 2025-01-14 | End: 2025-01-17

## 2025-01-14 RX ORDER — CIPROFLOXACIN AND DEXAMETHASONE 3; 1 MG/ML; MG/ML
4 SUSPENSION/ DROPS AURICULAR (OTIC) 2 TIMES DAILY
Qty: 1 EACH | Refills: 0 | Status: SHIPPED | OUTPATIENT
Start: 2025-01-14 | End: 2025-01-21

## 2025-01-14 RX ORDER — PROPRANOLOL HYDROCHLORIDE 120 MG/1
120 CAPSULE, EXTENDED RELEASE ORAL DAILY
Qty: 90 CAPSULE | Refills: 1 | Status: SHIPPED | OUTPATIENT
Start: 2025-01-14

## 2025-01-14 SDOH — ECONOMIC STABILITY: FOOD INSECURITY: WITHIN THE PAST 12 MONTHS, THE FOOD YOU BOUGHT JUST DIDN'T LAST AND YOU DIDN'T HAVE MONEY TO GET MORE.: NEVER TRUE

## 2025-01-14 SDOH — ECONOMIC STABILITY: FOOD INSECURITY: WITHIN THE PAST 12 MONTHS, YOU WORRIED THAT YOUR FOOD WOULD RUN OUT BEFORE YOU GOT MONEY TO BUY MORE.: NEVER TRUE

## 2025-01-14 ASSESSMENT — PATIENT HEALTH QUESTIONNAIRE - PHQ9
SUM OF ALL RESPONSES TO PHQ QUESTIONS 1-9: 0
1. LITTLE INTEREST OR PLEASURE IN DOING THINGS: NOT AT ALL
9. THOUGHTS THAT YOU WOULD BE BETTER OFF DEAD, OR OF HURTING YOURSELF: NOT AT ALL
4. FEELING TIRED OR HAVING LITTLE ENERGY: NOT AT ALL
3. TROUBLE FALLING OR STAYING ASLEEP: NOT AT ALL
SUM OF ALL RESPONSES TO PHQ9 QUESTIONS 1 & 2: 0
2. FEELING DOWN, DEPRESSED OR HOPELESS: NOT AT ALL
SUM OF ALL RESPONSES TO PHQ QUESTIONS 1-9: 0
SUM OF ALL RESPONSES TO PHQ QUESTIONS 1-9: 0
DEPRESSION UNABLE TO ASSESS: FUNCTIONAL CAPACITY MOTIVATION LIMITS ACCURACY
5. POOR APPETITE OR OVEREATING: NOT AT ALL
6. FEELING BAD ABOUT YOURSELF - OR THAT YOU ARE A FAILURE OR HAVE LET YOURSELF OR YOUR FAMILY DOWN: NOT AT ALL
10. IF YOU CHECKED OFF ANY PROBLEMS, HOW DIFFICULT HAVE THESE PROBLEMS MADE IT FOR YOU TO DO YOUR WORK, TAKE CARE OF THINGS AT HOME, OR GET ALONG WITH OTHER PEOPLE: NOT DIFFICULT AT ALL
SUM OF ALL RESPONSES TO PHQ QUESTIONS 1-9: 0
8. MOVING OR SPEAKING SO SLOWLY THAT OTHER PEOPLE COULD HAVE NOTICED. OR THE OPPOSITE, BEING SO FIGETY OR RESTLESS THAT YOU HAVE BEEN MOVING AROUND A LOT MORE THAN USUAL: NOT AT ALL
7. TROUBLE CONCENTRATING ON THINGS, SUCH AS READING THE NEWSPAPER OR WATCHING TELEVISION: NOT AT ALL

## 2025-01-14 NOTE — PROGRESS NOTES
[facility] INTERNAL MEDICINE    Visit Date:  1/14/2025  Patient:  Sp Jimenez  YOB: 1945    Assessment & Plan     URI: Given nature and duration of symptoms, will treat with antibiotics.  Will also treat with wakefulness and and Mucinex.    Tremor: Will increase dose of propranolol to 120 mg daily.  Reassess next visit.    Neuropathy: Continue Lyrica.  Reassess next visit.    Allergic rhinitis: Continue Flonase neuropathy: Will prescribe Lyrica.  Reassess next visit.    Anxiety: Continue Lexapro.  Reassess next visit.    Hypertension: Blood pressure controlled.  Continue losartan and hydrochlorothiazide.    Hypothyroidism: Continue levothyroxine.  Will continue to monitor.     Diagnosis Orders   1. Cough, unspecified type  guaiFENesin-codeine (GUAIFENESIN AC) 100-10 MG/5ML liquid      2. Uncomplicated asthma, unspecified asthma severity, unspecified whether persistent        3. Hypertension, unspecified type        4. Hypothyroidism, unspecified type  CBC with Auto Differential    Comprehensive Metabolic Panel    TSH reflex to FT4      5. IFG (impaired fasting glucose)  Hemoglobin A1C      6. Coronary artery disease without angina pectoris, unspecified vessel or lesion type, unspecified whether native or transplanted heart  Lipid Panel          Chief Complaint  3 Month Follow-Up    History of Present Illness   Reports that she has been having nasal congestion for the last 15 days.  Associated with cough.  Denies fever, chills, shortness of breath, chest pain at this time.    Moreover, she has been using propranolol for tremor, and that seems to be getting worse.  She says that especially worse with action or when she is holding a tea pot or kettle.    Objective  /80 (Site: Left Upper Arm, Position: Sitting, Cuff Size: Medium Adult)   Pulse 89   Resp 16   Ht 1.6 m (5' 3\")   Wt 72.1 kg (159 lb)   SpO2 98%   BMI 28.17 kg/m²   Constitutional: No acute distress.  Sits in chair

## 2025-01-15 ENCOUNTER — HOSPITAL ENCOUNTER (OUTPATIENT)
Dept: PHYSICAL THERAPY | Age: 80
Setting detail: THERAPIES SERIES
Discharge: HOME | End: 2025-01-15
Payer: MEDICAID

## 2025-01-15 PROCEDURE — 97110 THERAPEUTIC EXERCISES: CPT

## 2025-01-15 NOTE — FLOWSHEET NOTE
Goals  Time Frame for Long Term Goals : 6 weeks  Long Term Goal 1: Indep with HEP - met   Long Term Goal 2: Pt to have decreased Right knee pain to 0/10 for improved ease with ADL and ambulationv(met, 0/10)  Long Term Goal 3: Pt to increase right knee strength to WFL for improved ease with ADL and ambulation (4-4+/5)  Long Term Goal 4: Pt to ambulate community distances with least restrictive AD to increase indep with ambulation in home and community (Able to walk community distance with intermittent assistance.)    Plan:   [] Continue per plan of care [] Alter current plan (see comments)  [] Plan of care initiated [] Hold pending MD visit [x] Discharge    Plan for Next Session:  PENNY 1/15/25    Electronically signed by:  EZEKIEL BLISS PTA

## 2025-01-17 ENCOUNTER — APPOINTMENT (OUTPATIENT)
Dept: PHYSICAL THERAPY | Age: 80
End: 2025-01-17
Payer: MEDICAID

## 2025-01-21 ENCOUNTER — TELEPHONE (OUTPATIENT)
Dept: INTERNAL MEDICINE | Age: 80
End: 2025-01-21

## 2025-01-21 ENCOUNTER — OFFICE VISIT (OUTPATIENT)
Dept: OBGYN | Age: 80
End: 2025-01-21
Payer: MEDICAID

## 2025-01-21 ENCOUNTER — HOSPITAL ENCOUNTER (OUTPATIENT)
Age: 80
Setting detail: SPECIMEN
Discharge: HOME | End: 2025-01-21
Payer: MEDICAID

## 2025-01-21 VITALS — BODY MASS INDEX: 27.81 KG/M2 | WEIGHT: 157 LBS

## 2025-01-21 DIAGNOSIS — N89.8 VAGINAL ITCHING: Primary | ICD-10-CM

## 2025-01-21 DIAGNOSIS — N89.8 VAGINAL ODOR: ICD-10-CM

## 2025-01-21 DIAGNOSIS — N89.8 VAGINAL ITCHING: ICD-10-CM

## 2025-01-21 DIAGNOSIS — R73.03 PREDIABETES: ICD-10-CM

## 2025-01-21 LAB
CANDIDA SPECIES: NEGATIVE
GARDNERELLA VAGINALIS: NEGATIVE
SOURCE: NORMAL
TRICHOMONAS: NEGATIVE

## 2025-01-21 PROCEDURE — G8417 CALC BMI ABV UP PARAM F/U: HCPCS

## 2025-01-21 PROCEDURE — 1090F PRES/ABSN URINE INCON ASSESS: CPT

## 2025-01-21 PROCEDURE — 99203 OFFICE O/P NEW LOW 30 MIN: CPT

## 2025-01-21 PROCEDURE — 1123F ACP DISCUSS/DSCN MKR DOCD: CPT

## 2025-01-21 PROCEDURE — 87510 GARDNER VAG DNA DIR PROBE: CPT

## 2025-01-21 PROCEDURE — 99214 OFFICE O/P EST MOD 30 MIN: CPT

## 2025-01-21 PROCEDURE — 87480 CANDIDA DNA DIR PROBE: CPT

## 2025-01-21 PROCEDURE — G8427 DOCREV CUR MEDS BY ELIG CLIN: HCPCS

## 2025-01-21 PROCEDURE — 87660 TRICHOMONAS VAGIN DIR PROBE: CPT

## 2025-01-21 PROCEDURE — 1036F TOBACCO NON-USER: CPT

## 2025-01-21 PROCEDURE — G8399 PT W/DXA RESULTS DOCUMENT: HCPCS

## 2025-01-21 RX ORDER — CLOBETASOL PROPIONATE 0.5 MG/G
OINTMENT TOPICAL
Qty: 30 G | Refills: 0 | Status: SHIPPED | OUTPATIENT
Start: 2025-01-21

## 2025-01-21 RX ORDER — FLUCONAZOLE 150 MG/1
150 TABLET ORAL
Qty: 2 TABLET | Refills: 0 | Status: SHIPPED | OUTPATIENT
Start: 2025-01-21 | End: 2025-01-27

## 2025-01-21 RX ORDER — BLOOD SUGAR DIAGNOSTIC
STRIP MISCELLANEOUS
Qty: 100 STRIP | Refills: 3 | Status: SHIPPED | OUTPATIENT
Start: 2025-01-21

## 2025-01-21 ASSESSMENT — ENCOUNTER SYMPTOMS
RESPIRATORY NEGATIVE: 1
ALLERGIC/IMMUNOLOGIC NEGATIVE: 1
GASTROINTESTINAL NEGATIVE: 1
EYES NEGATIVE: 1

## 2025-01-21 NOTE — PROGRESS NOTES
Urgency and frequency  Foul odor that is now gone  
Patient declined and agrees to continue with exam without a chaperone.                         Assessment and Plan        Diagnosis Orders   1. Vaginal itching  Vaginitis DNA Probe    fluconazole (DIFLUCAN) 150 MG tablet    clobetasol (TEMOVATE) 0.05 % ointment      2. Vaginal odor             Starting on clobetasol externally nightly X 2 weeks for itching and irritation  1 oral fluconazole for suspected yeast  Vaginitis swab obtained will notify if any bacterial infection present             I am having Sp Jimenez start on fluconazole and clobetasol. I am also having her maintain her triamcinolone, blood glucose monitor kit and supplies, OneTouch Verio Flex System, OneTouch Delica Plus [id number], OneTouch Verio, Biotrue, mupirocin, metFORMIN, hydroCHLOROthiazide, losartan, meclizine, methylPREDNISolone, aspirin, hydrocortisone, levothyroxine, triamterene-hydroCHLOROthiazide, escitalopram, propranolol, azithromycin, Handicap Placard, ciprofloxacin-dexAMETHasone, and guaiFENesin. We will continue to administer sodium hyaluronate, sodium hyaluronate, BUPivacaine, and triamcinolone acetonide.    No follow-ups on file.    There are no Patient Instructions on file for this visit.    Over 50% of time spent on counseling and care coordination on: see assessment and plan.  She was also counseled on her preventative health maintenance recommendations and follow-up.      FF time: 30 min      BRITTANY Rudd CNM,1/21/2025 1:20 PM

## 2025-02-06 ENCOUNTER — HOSPITAL ENCOUNTER (OUTPATIENT)
Age: 80
Discharge: HOME | End: 2025-02-06
Payer: MEDICAID

## 2025-02-06 ENCOUNTER — OFFICE VISIT (OUTPATIENT)
Dept: INTERNAL MEDICINE | Age: 80
End: 2025-02-06
Payer: MEDICAID

## 2025-02-06 VITALS
SYSTOLIC BLOOD PRESSURE: 120 MMHG | BODY MASS INDEX: 28.17 KG/M2 | OXYGEN SATURATION: 98 % | RESPIRATION RATE: 16 BRPM | WEIGHT: 159 LBS | HEIGHT: 63 IN | HEART RATE: 78 BPM | DIASTOLIC BLOOD PRESSURE: 60 MMHG

## 2025-02-06 DIAGNOSIS — R10.9 FLANK PAIN: ICD-10-CM

## 2025-02-06 DIAGNOSIS — I25.10 CORONARY ARTERY DISEASE WITHOUT ANGINA PECTORIS, UNSPECIFIED VESSEL OR LESION TYPE, UNSPECIFIED WHETHER NATIVE OR TRANSPLANTED HEART: ICD-10-CM

## 2025-02-06 DIAGNOSIS — R05.9 COUGH, UNSPECIFIED TYPE: ICD-10-CM

## 2025-02-06 DIAGNOSIS — E03.9 HYPOTHYROIDISM, UNSPECIFIED TYPE: Primary | ICD-10-CM

## 2025-02-06 DIAGNOSIS — R73.03 PREDIABETES: ICD-10-CM

## 2025-02-06 DIAGNOSIS — R73.01 IFG (IMPAIRED FASTING GLUCOSE): ICD-10-CM

## 2025-02-06 DIAGNOSIS — E03.9 HYPOTHYROIDISM, UNSPECIFIED TYPE: ICD-10-CM

## 2025-02-06 LAB
ALBUMIN SERPL-MCNC: 4.2 G/DL (ref 3.5–5.2)
ALBUMIN/GLOB SERPL: 1.4 {RATIO} (ref 1–2.5)
ALP SERPL-CCNC: 70 U/L (ref 35–104)
ALT SERPL-CCNC: 10 U/L (ref 5–33)
ANION GAP SERPL CALCULATED.3IONS-SCNC: 12 MMOL/L (ref 9–17)
AST SERPL-CCNC: 14 U/L
BASOPHILS # BLD: 0.07 K/UL (ref 0–0.2)
BASOPHILS NFR BLD: 1 % (ref 0–2)
BILIRUB SERPL-MCNC: 0.2 MG/DL (ref 0.3–1.2)
BILIRUB UR QL STRIP: NEGATIVE
BUN SERPL-MCNC: 21 MG/DL (ref 8–23)
BUN/CREAT SERPL: 18 (ref 9–20)
CALCIUM SERPL-MCNC: 9.2 MG/DL (ref 8.6–10.4)
CHLORIDE SERPL-SCNC: 100 MMOL/L (ref 98–107)
CHOLEST SERPL-MCNC: 179 MG/DL (ref 0–199)
CHOLESTEROL/HDL RATIO: 3.3
CLARITY UR: CLEAR
CO2 SERPL-SCNC: 26 MMOL/L (ref 20–31)
COLOR UR: YELLOW
COMMENT: ABNORMAL
CREAT SERPL-MCNC: 1.2 MG/DL (ref 0.5–0.9)
EOSINOPHIL # BLD: 0.26 K/UL (ref 0–0.44)
EOSINOPHILS RELATIVE PERCENT: 3 % (ref 1–4)
ERYTHROCYTE [DISTWIDTH] IN BLOOD BY AUTOMATED COUNT: 15.9 % (ref 11.8–14.4)
EST. AVERAGE GLUCOSE BLD GHB EST-MCNC: 148 MG/DL
GFR, ESTIMATED: 45 ML/MIN/1.73M2
GLUCOSE SERPL-MCNC: 122 MG/DL (ref 70–99)
GLUCOSE UR STRIP-MCNC: NEGATIVE MG/DL
HBA1C MFR BLD: 6.8 % (ref 4–6)
HCT VFR BLD AUTO: 36.3 % (ref 36.3–47.1)
HDLC SERPL-MCNC: 54 MG/DL
HGB BLD-MCNC: 11.6 G/DL (ref 11.9–15.1)
HGB UR QL STRIP.AUTO: NEGATIVE
IMM GRANULOCYTES # BLD AUTO: 0.03 K/UL (ref 0–0.3)
IMM GRANULOCYTES NFR BLD: 0 %
KETONES UR STRIP-MCNC: ABNORMAL MG/DL
LDLC SERPL CALC-MCNC: 95 MG/DL (ref 0–100)
LEUKOCYTE ESTERASE UR QL STRIP: NEGATIVE
LYMPHOCYTES NFR BLD: 2.86 K/UL (ref 1.1–3.7)
LYMPHOCYTES RELATIVE PERCENT: 35 % (ref 24–43)
MCH RBC QN AUTO: 26.7 PG (ref 25.2–33.5)
MCHC RBC AUTO-ENTMCNC: 32 G/DL (ref 25.2–33.5)
MCV RBC AUTO: 83.6 FL (ref 82.6–102.9)
MONOCYTES NFR BLD: 0.52 K/UL (ref 0.1–1.2)
MONOCYTES NFR BLD: 6 % (ref 3–12)
NEUTROPHILS NFR BLD: 55 % (ref 36–65)
NEUTS SEG NFR BLD: 4.43 K/UL (ref 1.5–8.1)
NITRITE UR QL STRIP: NEGATIVE
NRBC BLD-RTO: 0 PER 100 WBC
PH UR STRIP: 7 [PH] (ref 5–6)
PLATELET # BLD AUTO: 273 K/UL (ref 138–453)
PMV BLD AUTO: 11.4 FL (ref 8.1–13.5)
POTASSIUM SERPL-SCNC: 4.7 MMOL/L (ref 3.7–5.3)
PROT SERPL-MCNC: 7.3 G/DL (ref 6.4–8.3)
PROT UR STRIP-MCNC: NEGATIVE MG/DL
RBC # BLD AUTO: 4.34 M/UL (ref 3.95–5.11)
RBC # BLD: ABNORMAL 10*6/UL
SODIUM SERPL-SCNC: 138 MMOL/L (ref 135–144)
SP GR UR STRIP: 1.01 (ref 1.01–1.02)
T4 FREE SERPL-MCNC: 1.4 NG/DL (ref 0.92–1.68)
TRIGL SERPL-MCNC: 149 MG/DL
TSH SERPL DL<=0.05 MIU/L-ACNC: 5.75 UIU/ML (ref 0.3–5)
UROBILINOGEN UR STRIP-ACNC: NORMAL EU/DL (ref 0–1)
VLDLC SERPL CALC-MCNC: 30 MG/DL (ref 1–30)
WBC OTHER # BLD: 8.2 K/UL (ref 3.5–11.3)

## 2025-02-06 PROCEDURE — 3074F SYST BP LT 130 MM HG: CPT | Performed by: INTERNAL MEDICINE

## 2025-02-06 PROCEDURE — 99212 OFFICE O/P EST SF 10 MIN: CPT | Performed by: INTERNAL MEDICINE

## 2025-02-06 PROCEDURE — 1123F ACP DISCUSS/DSCN MKR DOCD: CPT | Performed by: INTERNAL MEDICINE

## 2025-02-06 PROCEDURE — G8399 PT W/DXA RESULTS DOCUMENT: HCPCS | Performed by: INTERNAL MEDICINE

## 2025-02-06 PROCEDURE — 80061 LIPID PANEL: CPT

## 2025-02-06 PROCEDURE — 80053 COMPREHEN METABOLIC PANEL: CPT

## 2025-02-06 PROCEDURE — 83036 HEMOGLOBIN GLYCOSYLATED A1C: CPT

## 2025-02-06 PROCEDURE — 81003 URINALYSIS AUTO W/O SCOPE: CPT

## 2025-02-06 PROCEDURE — 99214 OFFICE O/P EST MOD 30 MIN: CPT | Performed by: INTERNAL MEDICINE

## 2025-02-06 PROCEDURE — 84439 ASSAY OF FREE THYROXINE: CPT

## 2025-02-06 PROCEDURE — G8427 DOCREV CUR MEDS BY ELIG CLIN: HCPCS | Performed by: INTERNAL MEDICINE

## 2025-02-06 PROCEDURE — G8417 CALC BMI ABV UP PARAM F/U: HCPCS | Performed by: INTERNAL MEDICINE

## 2025-02-06 PROCEDURE — 1090F PRES/ABSN URINE INCON ASSESS: CPT | Performed by: INTERNAL MEDICINE

## 2025-02-06 PROCEDURE — 1036F TOBACCO NON-USER: CPT | Performed by: INTERNAL MEDICINE

## 2025-02-06 PROCEDURE — 85025 COMPLETE CBC W/AUTO DIFF WBC: CPT

## 2025-02-06 PROCEDURE — 36415 COLL VENOUS BLD VENIPUNCTURE: CPT

## 2025-02-06 PROCEDURE — 84443 ASSAY THYROID STIM HORMONE: CPT

## 2025-02-06 PROCEDURE — 3078F DIAST BP <80 MM HG: CPT | Performed by: INTERNAL MEDICINE

## 2025-02-06 PROCEDURE — G2211 COMPLEX E/M VISIT ADD ON: HCPCS | Performed by: INTERNAL MEDICINE

## 2025-02-06 RX ORDER — GUAIFENESIN/DEXTROMETHORPHAN 100-10MG/5
5 SYRUP ORAL 3 TIMES DAILY PRN
Qty: 420 ML | Refills: 0 | Status: SHIPPED | OUTPATIENT
Start: 2025-02-06 | End: 2025-02-06

## 2025-02-06 RX ORDER — GLYCERIN/PROPYLENE GLYCOL 0.6 %-0.6%
DROPPERETTE, SINGLE-USE DROP DISPENSER OPHTHALMIC (EYE)
Qty: 1 EACH | Refills: 2 | Status: SHIPPED | OUTPATIENT
Start: 2025-02-06

## 2025-02-06 RX ORDER — BLOOD SUGAR DIAGNOSTIC
STRIP MISCELLANEOUS
Qty: 200 STRIP | Refills: 3 | Status: SHIPPED | OUTPATIENT
Start: 2025-02-06

## 2025-02-06 RX ORDER — CODEINE PHOSPHATE AND GUAIFENESIN 10; 100 MG/5ML; MG/5ML
5 SOLUTION ORAL 3 TIMES DAILY PRN
Qty: 420 ML | Refills: 0 | Status: SHIPPED | OUTPATIENT
Start: 2025-02-06 | End: 2025-03-06

## 2025-02-06 RX ORDER — NEOMYCIN/BACITRACIN/POLYMYXINB 3.5-400-5K
OINTMENT (GRAM) TOPICAL
Qty: 1 EACH | Refills: 0 | Status: SHIPPED | OUTPATIENT
Start: 2025-02-06

## 2025-02-06 RX ORDER — GLYCERIN/PROPYLENE GLYCOL 0.6 %-0.6%
DROPPERETTE, SINGLE-USE DROP DISPENSER OPHTHALMIC (EYE)
Qty: 1 EACH | Refills: 0 | Status: SHIPPED | OUTPATIENT
Start: 2025-02-06 | End: 2025-02-06

## 2025-02-06 NOTE — PROGRESS NOTES
[facility] INTERNAL MEDICINE    Visit Date:  2/6/2025  Patient:  Sp Jimenez  YOB: 1945    Assessment & Plan    Preop evaluation: Set to undergo cataract surgery.  She is low risk for surgery at this time.  Will continue to monitor.    Tremor: Will increase dose of propranolol to 120 mg daily.  Reassess next visit.    Neuropathy: Continue Lyrica.  Reassess next visit.    Allergic rhinitis: Continue Flonase neuropathy: Will prescribe Lyrica.  Reassess next visit.    Anxiety: Continue Lexapro.  Reassess next visit.    Hypertension: Blood pressure controlled.  Continue losartan and hydrochlorothiazide.    Hypothyroidism: Continue levothyroxine.  Will continue to monitor.     Diagnosis Orders   1. Hypothyroidism, unspecified type  Comprehensive Metabolic Panel    TSH reflex to FT4    CBC with Auto Differential      2. IFG (impaired fasting glucose)  Hemoglobin A1C      3. Coronary artery disease without angina pectoris, unspecified vessel or lesion type, unspecified whether native or transplanted heart  Lipid Panel      4. Flank pain  Urinalysis      5. Prediabetes  blood glucose test strips (ONETOUCH VERIO) strip      6. Cough, unspecified type  guaiFENesin-codeine (GUAIFENESIN AC) 100-10 MG/5ML liquid          Chief Complaint  Pre-op Exam (cataract surgery)    History of Present Illness   Presents for preoperative evaluation.  ACS had to undergo cataract surgery.  She has chronic rhinitis, which is likely allergic.  Also reports flank pain, for which I ordered a UA.  Denies dysuria, hematuria.  Denies fever, chills, chest pain, shortness of breath.    Objective  /60 (Site: Left Upper Arm, Position: Sitting, Cuff Size: Medium Adult)   Pulse 78   Resp 16   Ht 1.6 m (5' 3\")   Wt 72.1 kg (159 lb)   SpO2 98%   BMI 28.17 kg/m²   Constitutional: No acute distress.  Sits in chair comfortably  Eyes: Sclerae nonicteric. No lid lag or proptosis  HENT: External ears normal. No external

## 2025-02-18 ENCOUNTER — TELEPHONE (OUTPATIENT)
Dept: INTERNAL MEDICINE | Age: 80
End: 2025-02-18

## 2025-03-10 RX ORDER — ESCITALOPRAM OXALATE 20 MG/1
TABLET ORAL
Qty: 90 TABLET | Refills: 0 | Status: SHIPPED | OUTPATIENT
Start: 2025-03-10

## 2025-03-10 NOTE — TELEPHONE ENCOUNTER
Pharmacy requesting a refill of the below medication which has been pended for you:     Requested Prescriptions     Pending Prescriptions Disp Refills    escitalopram (LEXAPRO) 20 MG tablet [Pharmacy Med Name: Escitalopram Oxalate 20 MG Oral Tablet] 90 tablet 0     Sig: TAKE 1 & 1/2 (ONE & ONE-HALF) TABLETS BY MOUTH ONCE DAILY       Last Appointment Date: 2/6/2025  Next Appointment Date: 4/29/2025    No Known Allergies

## 2025-05-05 DIAGNOSIS — M17.0 OSTEOARTHRITIS OF BOTH KNEES, UNSPECIFIED OSTEOARTHRITIS TYPE: Primary | ICD-10-CM

## 2025-05-06 RX ORDER — ESCITALOPRAM OXALATE 20 MG/1
TABLET ORAL
Qty: 90 TABLET | Refills: 0 | Status: SHIPPED | OUTPATIENT
Start: 2025-05-06

## 2025-05-13 ENCOUNTER — OFFICE VISIT (OUTPATIENT)
Dept: ORTHOPEDIC SURGERY | Age: 80
End: 2025-05-13
Payer: MEDICAID

## 2025-05-13 ENCOUNTER — TELEPHONE (OUTPATIENT)
Dept: ORTHOPEDIC SURGERY | Age: 80
End: 2025-05-13

## 2025-05-13 ENCOUNTER — HOSPITAL ENCOUNTER (OUTPATIENT)
Dept: GENERAL RADIOLOGY | Age: 80
Discharge: HOME | End: 2025-05-15
Payer: MEDICAID

## 2025-05-13 VITALS
DIASTOLIC BLOOD PRESSURE: 62 MMHG | HEIGHT: 63 IN | BODY MASS INDEX: 28.17 KG/M2 | SYSTOLIC BLOOD PRESSURE: 136 MMHG | HEART RATE: 85 BPM | WEIGHT: 159 LBS

## 2025-05-13 DIAGNOSIS — M17.0 OSTEOARTHRITIS OF BOTH KNEES, UNSPECIFIED OSTEOARTHRITIS TYPE: ICD-10-CM

## 2025-05-13 DIAGNOSIS — M17.12 PRIMARY OSTEOARTHRITIS OF LEFT KNEE: Primary | ICD-10-CM

## 2025-05-13 PROCEDURE — 3075F SYST BP GE 130 - 139MM HG: CPT | Performed by: PHYSICIAN ASSISTANT

## 2025-05-13 PROCEDURE — 1090F PRES/ABSN URINE INCON ASSESS: CPT | Performed by: PHYSICIAN ASSISTANT

## 2025-05-13 PROCEDURE — 99214 OFFICE O/P EST MOD 30 MIN: CPT | Performed by: PHYSICIAN ASSISTANT

## 2025-05-13 PROCEDURE — 99213 OFFICE O/P EST LOW 20 MIN: CPT | Performed by: PHYSICIAN ASSISTANT

## 2025-05-13 PROCEDURE — G8417 CALC BMI ABV UP PARAM F/U: HCPCS | Performed by: PHYSICIAN ASSISTANT

## 2025-05-13 PROCEDURE — 73562 X-RAY EXAM OF KNEE 3: CPT

## 2025-05-13 PROCEDURE — G8399 PT W/DXA RESULTS DOCUMENT: HCPCS | Performed by: PHYSICIAN ASSISTANT

## 2025-05-13 PROCEDURE — 1123F ACP DISCUSS/DSCN MKR DOCD: CPT | Performed by: PHYSICIAN ASSISTANT

## 2025-05-13 PROCEDURE — G8427 DOCREV CUR MEDS BY ELIG CLIN: HCPCS | Performed by: PHYSICIAN ASSISTANT

## 2025-05-13 PROCEDURE — 3078F DIAST BP <80 MM HG: CPT | Performed by: PHYSICIAN ASSISTANT

## 2025-05-13 PROCEDURE — 1036F TOBACCO NON-USER: CPT | Performed by: PHYSICIAN ASSISTANT

## 2025-05-13 NOTE — H&P
History and Physical        CHIEF COMPLAINT: Left knee pain    HISTORY OF PRESENT ILLNESS:      The patient is a 80 y.o. female  who presents with a long history of left knee pain.  She had diagnosis of osteoarthritis in that left knee.  She has tried conservative methods of treatment which include Euflexxa injections.  She has not had any permanent relief of her symptoms.  Her son is with her today to help translate stating that she has tried over-the-counter pain medications as well as activity modification with no permanent relief of her symptoms.  She has previously tried corticosteroid injection in the left knee also.  She had a right total knee replacement done in November 2024 and did very well with that.  She is wishing to proceed with left total knee replacement at this time.    Past Medical History:    Past Medical History:   Diagnosis Date    Chronic kidney disease     family denies    Diabetes mellitus (HCC)     borderline diabetic    HTN (hypertension)     Osteoporosis        Past Surgical History:    Past Surgical History:   Procedure Laterality Date    COLONOSCOPY N/A 09/20/2023    with polypectomy and snare/cold biopsy, x 3 small adenomatous polyps, x 1 lipoma, sigmoid diverticulois, hemorrhoids - performed by Manny Arana MD at [facility] OR    EXTERNAL EAR SURGERY      20 years ago    TOTAL KNEE ARTHROPLASTY Right 11/19/2024    Right total knee arthroplasty performed by Williams Guerrero MD at [facility] OR    UPPER GASTROINTESTINAL ENDOSCOPY N/A 09/20/2023    EGD Biopsy mild chronic gastritis, small hiatal hernia - performed by Manny Arana MD at [facility] OR       Medications Prior to Admission:   Prior to Admission medications    Medication Sig Start Date End Date Taking? Authorizing Provider   escitalopram (LEXAPRO) 20 MG tablet TAKE 1 & 1/2 (ONE & ONE-HALF) TABLETS BY MOUTH ONCE DAILY 5/6/25   Sg Maguire MD   neomycin-bacitracin-polymyxin (NEOSPORIN) 5-400-5000 ointment Apply topically 4 times daily.

## 2025-05-13 NOTE — TELEPHONE ENCOUNTER
[facility]     Pre-Operative Evaluation/Consultation    Name:  Sp Jimenez                                         Age:  80 y.o.  MRN:  [id number]       :  1945   Date:  2025         Sex: female    There were no encounter diagnoses.    Surgeon:  Dr. pedersen  Procedure (Planned):  left tka  Date Scheduled surgery:     Attending : No att. providers found    Primary Physician:   Cardiologist: None    Type of Anesthesia Requested: General    Patient Medical history:  No Known Allergies  Social History     Tobacco Use    Smoking status: Never    Smokeless tobacco: Never   Vaping Use    Vaping status: Never Used   Substance Use Topics    Alcohol use: Not Currently    Drug use: No         Additional ordered pre-operative testing:  []CBC    []ABG      [] BMP   []URINALYSIS   []CMP    []HCG   []COAGS PT/INR  []T&C  []LFTs   []TYPE AND SCREEN    [] EKG  [] Chest X-Ray  [] Other Radiology    [] Sent to Hospitalist None  [] Sent to Anesthesia for your review: None   [] Additional Orders: None     Comments:None   Requests: None    Signed: Ewa Crespo LPN 2025 9:02 AM

## 2025-05-22 ENCOUNTER — OFFICE VISIT (OUTPATIENT)
Dept: INTERNAL MEDICINE | Age: 80
End: 2025-05-22
Payer: MEDICAID

## 2025-05-22 ENCOUNTER — HOSPITAL ENCOUNTER (OUTPATIENT)
Age: 80
Discharge: HOME | End: 2025-05-22
Payer: MEDICAID

## 2025-05-22 VITALS
RESPIRATION RATE: 16 BRPM | BODY MASS INDEX: 27.64 KG/M2 | HEART RATE: 78 BPM | DIASTOLIC BLOOD PRESSURE: 72 MMHG | HEIGHT: 63 IN | WEIGHT: 156 LBS | SYSTOLIC BLOOD PRESSURE: 132 MMHG | OXYGEN SATURATION: 97 %

## 2025-05-22 DIAGNOSIS — I10 HYPERTENSION, UNSPECIFIED TYPE: ICD-10-CM

## 2025-05-22 DIAGNOSIS — E03.9 HYPOTHYROIDISM, UNSPECIFIED TYPE: ICD-10-CM

## 2025-05-22 DIAGNOSIS — L40.9 PSORIASIS: ICD-10-CM

## 2025-05-22 DIAGNOSIS — I25.10 CORONARY ARTERY DISEASE WITHOUT ANGINA PECTORIS, UNSPECIFIED VESSEL OR LESION TYPE, UNSPECIFIED WHETHER NATIVE OR TRANSPLANTED HEART: ICD-10-CM

## 2025-05-22 DIAGNOSIS — E03.9 HYPOTHYROIDISM, UNSPECIFIED TYPE: Primary | ICD-10-CM

## 2025-05-22 DIAGNOSIS — R73.03 PREDIABETES: ICD-10-CM

## 2025-05-22 LAB
ALBUMIN SERPL-MCNC: 4.3 G/DL (ref 3.5–5.2)
ALBUMIN/GLOB SERPL: 1.5 {RATIO} (ref 1–2.5)
ALP SERPL-CCNC: 60 U/L (ref 35–104)
ALT SERPL-CCNC: 15 U/L (ref 10–35)
ANION GAP SERPL CALCULATED.3IONS-SCNC: 10 MMOL/L (ref 9–16)
AST SERPL-CCNC: 16 U/L (ref 10–35)
BASOPHILS # BLD: 0.09 K/UL (ref 0–0.2)
BASOPHILS NFR BLD: 1 % (ref 0–2)
BILIRUB SERPL-MCNC: 0.3 MG/DL (ref 0–1.2)
BUN SERPL-MCNC: 27 MG/DL (ref 8–23)
BUN/CREAT SERPL: 21 (ref 9–20)
CALCIUM SERPL-MCNC: 9.3 MG/DL (ref 8.6–10.4)
CHLORIDE SERPL-SCNC: 104 MMOL/L (ref 98–107)
CHOLEST SERPL-MCNC: 167 MG/DL (ref 0–199)
CHOLESTEROL/HDL RATIO: 3.5
CO2 SERPL-SCNC: 24 MMOL/L (ref 20–31)
CREAT SERPL-MCNC: 1.3 MG/DL (ref 0.6–0.9)
EOSINOPHIL # BLD: 0.56 K/UL (ref 0–0.44)
EOSINOPHILS RELATIVE PERCENT: 7 % (ref 1–4)
ERYTHROCYTE [DISTWIDTH] IN BLOOD BY AUTOMATED COUNT: 16.8 % (ref 11.8–14.4)
EST. AVERAGE GLUCOSE BLD GHB EST-MCNC: 143 MG/DL
GFR, ESTIMATED: 41 ML/MIN/1.73M2
GLUCOSE SERPL-MCNC: 143 MG/DL (ref 74–99)
HBA1C MFR BLD: 6.6 % (ref 4–6)
HCT VFR BLD AUTO: 35.6 % (ref 36.3–47.1)
HDLC SERPL-MCNC: 48 MG/DL
HGB BLD-MCNC: 11.5 G/DL (ref 11.9–15.1)
IMM GRANULOCYTES # BLD AUTO: <0.03 K/UL (ref 0–0.3)
IMM GRANULOCYTES NFR BLD: 0 %
LDLC SERPL CALC-MCNC: 72 MG/DL (ref 0–100)
LYMPHOCYTES NFR BLD: 2.58 K/UL (ref 1.1–3.7)
LYMPHOCYTES RELATIVE PERCENT: 32 % (ref 24–43)
MCH RBC QN AUTO: 27.4 PG (ref 25.2–33.5)
MCHC RBC AUTO-ENTMCNC: 32.3 G/DL (ref 25.2–33.5)
MCV RBC AUTO: 84.8 FL (ref 82.6–102.9)
MONOCYTES NFR BLD: 0.54 K/UL (ref 0.1–1.2)
MONOCYTES NFR BLD: 7 % (ref 3–12)
NEUTROPHILS NFR BLD: 53 % (ref 36–65)
NEUTS SEG NFR BLD: 4.2 K/UL (ref 1.5–8.1)
NRBC BLD-RTO: 0 PER 100 WBC
PLATELET # BLD AUTO: 283 K/UL (ref 138–453)
PMV BLD AUTO: 11.3 FL (ref 8.1–13.5)
POTASSIUM SERPL-SCNC: 5 MMOL/L (ref 3.7–5.3)
PROT SERPL-MCNC: 7.1 G/DL (ref 6.6–8.7)
RBC # BLD AUTO: 4.2 M/UL (ref 3.95–5.11)
RBC # BLD: ABNORMAL 10*6/UL
SODIUM SERPL-SCNC: 138 MMOL/L (ref 136–145)
TRIGL SERPL-MCNC: 235 MG/DL
TSH SERPL DL<=0.05 MIU/L-ACNC: 3.81 UIU/ML (ref 0.27–4.2)
VLDLC SERPL CALC-MCNC: 47 MG/DL (ref 1–30)
WBC OTHER # BLD: 8 K/UL (ref 3.5–11.3)

## 2025-05-22 PROCEDURE — 85025 COMPLETE CBC W/AUTO DIFF WBC: CPT

## 2025-05-22 PROCEDURE — G8427 DOCREV CUR MEDS BY ELIG CLIN: HCPCS | Performed by: INTERNAL MEDICINE

## 2025-05-22 PROCEDURE — 1036F TOBACCO NON-USER: CPT | Performed by: INTERNAL MEDICINE

## 2025-05-22 PROCEDURE — 36415 COLL VENOUS BLD VENIPUNCTURE: CPT

## 2025-05-22 PROCEDURE — 80061 LIPID PANEL: CPT

## 2025-05-22 PROCEDURE — G2211 COMPLEX E/M VISIT ADD ON: HCPCS | Performed by: INTERNAL MEDICINE

## 2025-05-22 PROCEDURE — 1123F ACP DISCUSS/DSCN MKR DOCD: CPT | Performed by: INTERNAL MEDICINE

## 2025-05-22 PROCEDURE — 83036 HEMOGLOBIN GLYCOSYLATED A1C: CPT

## 2025-05-22 PROCEDURE — 99214 OFFICE O/P EST MOD 30 MIN: CPT | Performed by: INTERNAL MEDICINE

## 2025-05-22 PROCEDURE — 1090F PRES/ABSN URINE INCON ASSESS: CPT | Performed by: INTERNAL MEDICINE

## 2025-05-22 PROCEDURE — G8399 PT W/DXA RESULTS DOCUMENT: HCPCS | Performed by: INTERNAL MEDICINE

## 2025-05-22 PROCEDURE — 3078F DIAST BP <80 MM HG: CPT | Performed by: INTERNAL MEDICINE

## 2025-05-22 PROCEDURE — 3075F SYST BP GE 130 - 139MM HG: CPT | Performed by: INTERNAL MEDICINE

## 2025-05-22 PROCEDURE — 80053 COMPREHEN METABOLIC PANEL: CPT

## 2025-05-22 PROCEDURE — 84443 ASSAY THYROID STIM HORMONE: CPT

## 2025-05-22 PROCEDURE — G8417 CALC BMI ABV UP PARAM F/U: HCPCS | Performed by: INTERNAL MEDICINE

## 2025-05-22 RX ORDER — BLOOD-GLUCOSE METER
EACH MISCELLANEOUS
Status: CANCELLED | OUTPATIENT
Start: 2025-05-22

## 2025-05-22 RX ORDER — LANCETS 33 GAUGE
EACH MISCELLANEOUS
Qty: 100 EACH | Refills: 3 | Status: SHIPPED | OUTPATIENT
Start: 2025-05-22

## 2025-05-22 RX ORDER — BETAMETHASONE DIPROPIONATE 0.05 %
OINTMENT (GRAM) TOPICAL
Qty: 45 G | Refills: 1 | Status: SHIPPED | OUTPATIENT
Start: 2025-05-22

## 2025-05-22 RX ORDER — SYRING-NEEDL,DISP,INSUL,0.3 ML 30 GX5/16"
1 SYRINGE, EMPTY DISPOSABLE MISCELLANEOUS ONCE
Qty: 100 EACH | Refills: 0 | Status: SHIPPED | OUTPATIENT
Start: 2025-05-22 | End: 2025-05-22

## 2025-05-22 RX ORDER — BLOOD SUGAR DIAGNOSTIC
STRIP MISCELLANEOUS
Qty: 200 STRIP | Refills: 3 | Status: SHIPPED | OUTPATIENT
Start: 2025-05-22

## 2025-05-22 NOTE — PROGRESS NOTES
[facility] INTERNAL MEDICINE    Visit Date:  5/22/2025  Patient:  Sp Jimenez  YOB: 1945    Assessment & Plan    Preop evaluation: Set to undergo left knee replacement.  She is low risk for surgery at this time.  No need for further testing at this time.    Tremor: Continue propranolol.  Reassess next visit.    Neuropathy: Continue Lyrica.  Reassess next visit.    Allergic rhinitis: Continue Flonase neuropathy: Will prescribe Lyrica.  Reassess next visit.    Anxiety: Continue Lexapro.  Reassess next visit.    Hypertension: Blood pressure controlled.  Continue losartan and hydrochlorothiazide.    Hypothyroidism: Continue levothyroxine.  Will continue to monitor.     Diagnosis Orders   1. Hypothyroidism, unspecified type  TSH reflex to FT4      2. Prediabetes  blood glucose test strips (ONETOUCH VERIO) strip    Hemoglobin A1C      3. Hypertension, unspecified type  CBC with Auto Differential    Comprehensive Metabolic Panel      4. Psoriasis  betamethasone dipropionate 0.05 % ointment          Chief Complaint  Pre-op Exam (Left total knee )    History of Present Illness   Presents for preoperative evaluation.  She is set to undergo left knee replacement.  She underwent a right knee replacement last year, and says that her pain has improved quite a bit, and hence proceeding with a left knee replacement.  She has appointment with orthopedics already.  Other than pain in her knee, she has no symptoms at this time.  Denies fever, chills, chest pain, shortness of breath.    Objective  /72 (BP Site: Left Upper Arm, Patient Position: Sitting, BP Cuff Size: Medium Adult)   Pulse 78   Resp 16   Ht 1.6 m (5' 3\")   Wt 70.8 kg (156 lb)   SpO2 97%   BMI 27.63 kg/m²   Constitutional: No acute distress.  Sits in chair comfortably  Eyes: Sclerae nonicteric. No lid lag or proptosis  HENT: External ears normal. No external lesions on the nose  Neck: No gross masses. Trachea visibly

## 2025-05-23 ENCOUNTER — TELEPHONE (OUTPATIENT)
Dept: INTERNAL MEDICINE | Age: 80
End: 2025-05-23

## 2025-05-23 NOTE — TELEPHONE ENCOUNTER
Patient's son called in stating that the betamethasone dipropionate ointment that Dr. Maguire prescribed for patient is not covered by insurance and would like something different sent to [facility], if agreeable.

## 2025-05-27 ENCOUNTER — RESULTS FOLLOW-UP (OUTPATIENT)
Dept: INTERNAL MEDICINE | Age: 80
End: 2025-05-27

## 2025-05-27 RX ORDER — FLUOCINONIDE 0.5 MG/G
CREAM TOPICAL
Qty: 30 G | Refills: 0 | Status: SHIPPED | OUTPATIENT
Start: 2025-05-27

## 2025-06-02 DIAGNOSIS — Z01.818 PREOPERATIVE TESTING: Primary | ICD-10-CM

## 2025-06-05 ENCOUNTER — TELEPHONE (OUTPATIENT)
Dept: INTERNAL MEDICINE | Age: 80
End: 2025-06-05

## 2025-06-05 RX ORDER — AZITHROMYCIN 250 MG/1
TABLET, FILM COATED ORAL
Qty: 6 TABLET | Refills: 0 | Status: SHIPPED | OUTPATIENT
Start: 2025-06-05 | End: 2025-06-15

## 2025-06-05 NOTE — TELEPHONE ENCOUNTER
Timmy called in stating that patient has a cold. She complains of a cough, sore throat and congestion. They would like to know if something can be sent into [facility]     Please advise

## 2025-06-09 ENCOUNTER — HOSPITAL ENCOUNTER (OUTPATIENT)
Age: 80
Discharge: HOME | End: 2025-06-09
Payer: MEDICAID

## 2025-06-09 ENCOUNTER — HOSPITAL ENCOUNTER (OUTPATIENT)
Age: 80
Setting detail: SPECIMEN
Discharge: HOME | End: 2025-06-09
Payer: MEDICAID

## 2025-06-09 ENCOUNTER — CLINICAL SUPPORT (OUTPATIENT)
Dept: ORTHOPEDIC SURGERY | Age: 80
End: 2025-06-09
Payer: MEDICAID

## 2025-06-09 VITALS — WEIGHT: 153 LBS | HEIGHT: 63 IN | BODY MASS INDEX: 27.11 KG/M2

## 2025-06-09 DIAGNOSIS — M17.12 PRIMARY OSTEOARTHRITIS OF LEFT KNEE: Primary | ICD-10-CM

## 2025-06-09 DIAGNOSIS — Z01.818 PREOPERATIVE TESTING: ICD-10-CM

## 2025-06-09 LAB
BACTERIA URNS QL MICRO: ABNORMAL
BILIRUB UR QL STRIP: NEGATIVE
CHARACTER UR: ABNORMAL
CLARITY UR: CLEAR
COLOR UR: YELLOW
EPI CELLS #/AREA URNS HPF: ABNORMAL /HPF (ref 0–5)
GLUCOSE UR STRIP-MCNC: NEGATIVE MG/DL
HGB UR QL STRIP.AUTO: NEGATIVE
KETONES UR STRIP-MCNC: NEGATIVE MG/DL
LEUKOCYTE ESTERASE UR QL STRIP: ABNORMAL
NITRITE UR QL STRIP: NEGATIVE
PH UR STRIP: 7 [PH] (ref 5–6)
PROT UR STRIP-MCNC: NEGATIVE MG/DL
RBC #/AREA URNS HPF: ABNORMAL /HPF (ref 0–4)
SP GR UR STRIP: 1.01 (ref 1.01–1.02)
UROBILINOGEN UR STRIP-ACNC: NORMAL EU/DL (ref 0–1)
WBC #/AREA URNS HPF: ABNORMAL /HPF (ref 0–4)

## 2025-06-09 PROCEDURE — 81001 URINALYSIS AUTO W/SCOPE: CPT

## 2025-06-09 PROCEDURE — 99215 OFFICE O/P EST HI 40 MIN: CPT

## 2025-06-09 PROCEDURE — 87641 MR-STAPH DNA AMP PROBE: CPT

## 2025-06-09 NOTE — PROGRESS NOTES
Patient here to schedule her left tka with dr pedersen, procedure and instructions reviewed and copy given to patient, she is scheduled here at [facility] 6/24/25. She will call with questions or changes.

## 2025-06-10 ENCOUNTER — TELEPHONE (OUTPATIENT)
Dept: INTERNAL MEDICINE | Age: 80
End: 2025-06-10

## 2025-06-10 ENCOUNTER — TELEPHONE (OUTPATIENT)
Dept: ORTHOPEDIC SURGERY | Age: 80
End: 2025-06-10

## 2025-06-10 LAB
MRSA, DNA, NASAL: NEGATIVE
SPECIMEN DESCRIPTION: NORMAL

## 2025-06-10 RX ORDER — CEPHALEXIN 500 MG/1
500 CAPSULE ORAL 3 TIMES DAILY
Qty: 21 CAPSULE | Refills: 0 | Status: SHIPPED | OUTPATIENT
Start: 2025-06-10 | End: 2025-06-17

## 2025-06-10 NOTE — TELEPHONE ENCOUNTER
Dr. Guerrero's Office called and would like you to look at this patients urine to treat. Please call patient to discuss.

## 2025-06-10 NOTE — TELEPHONE ENCOUNTER
Spoke with son Jarrett, Dr Maguire called in antibiotic for UTI  Patient repeating urine on 06/20/25

## 2025-06-18 ENCOUNTER — TELEPHONE (OUTPATIENT)
Dept: INTERNAL MEDICINE | Age: 80
End: 2025-06-18

## 2025-06-18 NOTE — TELEPHONE ENCOUNTER
Patient needs a new script for a True metrix glucose monitor, Strips and testing supplies she checks Three times a day. Her insurance no longer covers the strips for the machine she had.

## 2025-06-19 RX ORDER — CALCIUM CITRATE/VITAMIN D3 200MG-6.25
1 TABLET ORAL 3 TIMES DAILY
Qty: 100 EACH | Refills: 3 | Status: SHIPPED | OUTPATIENT
Start: 2025-06-19

## 2025-06-19 RX ORDER — BLOOD-GLUCOSE METER
EACH MISCELLANEOUS
Qty: 1 EACH | Refills: 0 | Status: SHIPPED | OUTPATIENT
Start: 2025-06-19

## 2025-06-19 NOTE — DISCHARGE INSTRUCTIONS
Follow up with Dr. Maguire in 1 week. They will call you to schedule or call them at [phone].   Discuss Potassium level in the hospital setting and review medications.   Lab work: [facility] 06/27/25- come in to lab to draw blood. The order is placed.         DR. GUERRERO DISCHARGE INSTRUCTIONS  Dr. Williams Guerrero MD       ACTIVITY / WEIGHTBEARING  INSTRUCTIONS:  Weightbearing as tolerated surgical extremity.   PT/OT if ordered.    DIET:  Increase Fluid/Water intake, eat foods high in fiber, fruits and vegetables to help to prevent constipation.    WOUND/DRESSING INSTRUCTIONS:  Always ensure you wash your hands before and after caring for your wound/dressing.  Keep your dressing clean and dry. Change dressing as needed using dry gauze.  Can wash around incision.     Do not place antibiotic ointment, lotion, creams on surgical incision.  Smoking impairs adequate wound healing.  If smoking, consider quitting and decreasing.  If diabetic, keeping blood glucose levels in acceptable range will limit complications.   May apply ice to surgical incision to help to prevent swelling. Apply 20 minutes at a time, as needed.      MEDICATION INSTRUCTIONS:  Take pain medication as prescribed.  Decrease as tolerable.   See orders regarding resuming your home medications and any new medications.  Continue blood thinner if ordered by your physician.   Wear compression stocking as directed, this will help reduce swelling and blood clots. Wear on during the day, may remove at bedtime. Wear until follow-up unless otherwise directed.     FOLLOW-UP CARE:  If a follow-up appointment was not made for you at discharge, call [phone] ([place] office) or [phone] ([place] office) to schedule an appointment for 3 weeks.    Call Dr Guerrero's office with any concerns.     Signs of infection such as fever, chills, redness, pus, or bad smelling discharge from incision site.     Excessive bleeding, swelling, increasing pain, or discharge around incision

## 2025-06-20 ENCOUNTER — HOSPITAL ENCOUNTER (OUTPATIENT)
Age: 80
Discharge: HOME | End: 2025-06-20
Payer: MEDICAID

## 2025-06-20 DIAGNOSIS — Z01.818 PRE-OP TESTING: ICD-10-CM

## 2025-06-20 DIAGNOSIS — Z01.818 PREOPERATIVE TESTING: ICD-10-CM

## 2025-06-20 DIAGNOSIS — Z01.818 PRE-OP TESTING: Primary | ICD-10-CM

## 2025-06-20 DIAGNOSIS — Z01.818 PREOP TESTING: Primary | ICD-10-CM

## 2025-06-20 LAB
BILIRUB UR QL STRIP: NEGATIVE
CLARITY UR: CLEAR
COLOR UR: YELLOW
COMMENT: NORMAL
GLUCOSE UR STRIP-MCNC: NEGATIVE MG/DL
HGB UR QL STRIP.AUTO: NEGATIVE
KETONES UR STRIP-MCNC: NEGATIVE MG/DL
LEUKOCYTE ESTERASE UR QL STRIP: NEGATIVE
NITRITE UR QL STRIP: NEGATIVE
PH UR STRIP: 5.5 [PH] (ref 5–6)
PROT UR STRIP-MCNC: NEGATIVE MG/DL
SP GR UR STRIP: 1.01 (ref 1.01–1.02)
UROBILINOGEN UR STRIP-ACNC: NORMAL EU/DL (ref 0–1)

## 2025-06-20 PROCEDURE — 36415 COLL VENOUS BLD VENIPUNCTURE: CPT

## 2025-06-20 PROCEDURE — 81003 URINALYSIS AUTO W/O SCOPE: CPT

## 2025-06-23 ENCOUNTER — HOSPITAL ENCOUNTER (OUTPATIENT)
Age: 80
Discharge: HOME | End: 2025-06-23
Payer: MEDICAID

## 2025-06-23 ENCOUNTER — ANESTHESIA EVENT (OUTPATIENT)
Dept: OPERATING ROOM | Age: 80
End: 2025-06-23
Payer: MEDICAID

## 2025-06-23 DIAGNOSIS — M79.671 RIGHT FOOT PAIN: Primary | ICD-10-CM

## 2025-06-23 DIAGNOSIS — Z01.818 PREOP TESTING: ICD-10-CM

## 2025-06-23 LAB
ERYTHROCYTE [DISTWIDTH] IN BLOOD BY AUTOMATED COUNT: 15.9 % (ref 11.8–14.4)
HCT VFR BLD AUTO: 34.5 % (ref 36.3–47.1)
HGB BLD-MCNC: 11.4 G/DL (ref 11.9–15.1)
MCH RBC QN AUTO: 27.9 PG (ref 25.2–33.5)
MCHC RBC AUTO-ENTMCNC: 33 G/DL (ref 25.2–33.5)
MCV RBC AUTO: 84.4 FL (ref 82.6–102.9)
NRBC BLD-RTO: 0 PER 100 WBC
PLATELET # BLD AUTO: 250 K/UL (ref 138–453)
PMV BLD AUTO: 10.8 FL (ref 8.1–13.5)
RBC # BLD AUTO: 4.09 M/UL (ref 3.95–5.11)
WBC OTHER # BLD: 7.1 K/UL (ref 3.5–11.3)

## 2025-06-23 PROCEDURE — 85027 COMPLETE CBC AUTOMATED: CPT

## 2025-06-23 PROCEDURE — 36415 COLL VENOUS BLD VENIPUNCTURE: CPT

## 2025-06-24 ENCOUNTER — APPOINTMENT (OUTPATIENT)
Dept: GENERAL RADIOLOGY | Age: 80
End: 2025-06-24
Attending: ORTHOPAEDIC SURGERY
Payer: MEDICAID

## 2025-06-24 ENCOUNTER — ANESTHESIA (OUTPATIENT)
Dept: OPERATING ROOM | Age: 80
End: 2025-06-24
Payer: MEDICAID

## 2025-06-24 ENCOUNTER — HOSPITAL ENCOUNTER (OUTPATIENT)
Age: 80
Setting detail: OBSERVATION
Discharge: HOME | End: 2025-06-25
Attending: ORTHOPAEDIC SURGERY | Admitting: ORTHOPAEDIC SURGERY
Payer: MEDICAID

## 2025-06-24 DIAGNOSIS — Z96.652 STATUS POST LEFT KNEE REPLACEMENT: Primary | ICD-10-CM

## 2025-06-24 DIAGNOSIS — E87.5 HYPERKALEMIA: ICD-10-CM

## 2025-06-24 PROBLEM — M17.12 LOCALIZED OSTEOARTHRITIS OF LEFT KNEE: Status: ACTIVE | Noted: 2025-06-24

## 2025-06-24 LAB
ALBUMIN SERPL-MCNC: 3.9 G/DL (ref 3.5–5.2)
ALBUMIN/GLOB SERPL: 1.5 {RATIO} (ref 1–2.5)
ALP SERPL-CCNC: 59 U/L (ref 35–104)
ALT SERPL-CCNC: 13 U/L (ref 10–35)
ANION GAP SERPL CALCULATED.3IONS-SCNC: 10 MMOL/L (ref 9–16)
ANION GAP SERPL CALCULATED.3IONS-SCNC: 11 MMOL/L (ref 9–16)
AST SERPL-CCNC: 18 U/L (ref 10–35)
BASOPHILS # BLD: 0.05 K/UL (ref 0–0.2)
BASOPHILS NFR BLD: 1 % (ref 0–2)
BILIRUB DIRECT SERPL-MCNC: <0.1 MG/DL (ref 0–0.2)
BILIRUB INDIRECT SERPL-MCNC: ABNORMAL MG/DL (ref 0–1)
BILIRUB SERPL-MCNC: <0.2 MG/DL (ref 0–1.2)
BUN SERPL-MCNC: 30 MG/DL (ref 8–23)
BUN SERPL-MCNC: 34 MG/DL (ref 8–23)
BUN/CREAT SERPL: 21 (ref 9–20)
CALCIUM SERPL-MCNC: 8 MG/DL (ref 8.6–10.4)
CALCIUM SERPL-MCNC: 8.5 MG/DL (ref 8.6–10.4)
CHLORIDE SERPL-SCNC: 102 MMOL/L (ref 98–107)
CHLORIDE SERPL-SCNC: 102 MMOL/L (ref 98–107)
CO2 SERPL-SCNC: 19 MMOL/L (ref 20–31)
CO2 SERPL-SCNC: 23 MMOL/L (ref 20–31)
CREAT SERPL-MCNC: 1.5 MG/DL (ref 0.6–0.9)
CREAT SERPL-MCNC: 1.6 MG/DL (ref 0.6–0.9)
EOSINOPHIL # BLD: 0.14 K/UL (ref 0–0.44)
EOSINOPHILS RELATIVE PERCENT: 1 % (ref 1–4)
ERYTHROCYTE [DISTWIDTH] IN BLOOD BY AUTOMATED COUNT: 15.9 % (ref 11.8–14.4)
GFR, ESTIMATED: 32 ML/MIN/1.73M2
GFR, ESTIMATED: 35 ML/MIN/1.73M2
GLUCOSE BLD-MCNC: 126 MG/DL (ref 65–105)
GLUCOSE BLD-MCNC: 168 MG/DL (ref 65–105)
GLUCOSE BLD-MCNC: 204 MG/DL (ref 65–105)
GLUCOSE BLD-MCNC: 226 MG/DL (ref 65–105)
GLUCOSE BLD-MCNC: 241 MG/DL (ref 65–105)
GLUCOSE BLD-MCNC: 259 MG/DL (ref 65–105)
GLUCOSE SERPL-MCNC: 209 MG/DL (ref 74–99)
GLUCOSE SERPL-MCNC: 258 MG/DL (ref 74–99)
HCT VFR BLD AUTO: 31.8 % (ref 36.3–47.1)
HGB BLD-MCNC: 10.2 G/DL (ref 11.9–15.1)
IMM GRANULOCYTES # BLD AUTO: 0.06 K/UL (ref 0–0.3)
IMM GRANULOCYTES NFR BLD: 1 %
LYMPHOCYTES NFR BLD: 1.47 K/UL (ref 1.1–3.7)
LYMPHOCYTES RELATIVE PERCENT: 14 % (ref 24–43)
MAGNESIUM SERPL-MCNC: 2 MG/DL (ref 1.6–2.4)
MCH RBC QN AUTO: 27.3 PG (ref 25.2–33.5)
MCHC RBC AUTO-ENTMCNC: 32.1 G/DL (ref 25.2–33.5)
MCV RBC AUTO: 85.3 FL (ref 82.6–102.9)
MONOCYTES NFR BLD: 0.16 K/UL (ref 0.1–1.2)
MONOCYTES NFR BLD: 2 % (ref 3–12)
NEUTROPHILS NFR BLD: 83 % (ref 36–65)
NEUTS SEG NFR BLD: 8.95 K/UL (ref 1.5–8.1)
NRBC BLD-RTO: 0 PER 100 WBC
PLATELET # BLD AUTO: 237 K/UL (ref 138–453)
PMV BLD AUTO: 11 FL (ref 8.1–13.5)
POTASSIUM SERPL-SCNC: 5.5 MMOL/L (ref 3.7–5.3)
POTASSIUM SERPL-SCNC: 6.8 MMOL/L (ref 3.7–5.3)
PROT SERPL-MCNC: 6.5 G/DL (ref 6.6–8.7)
RBC # BLD AUTO: 3.73 M/UL (ref 3.95–5.11)
RBC # BLD: ABNORMAL 10*6/UL
SODIUM SERPL-SCNC: 132 MMOL/L (ref 136–145)
SODIUM SERPL-SCNC: 135 MMOL/L (ref 136–145)
WBC OTHER # BLD: 10.8 K/UL (ref 3.5–11.3)

## 2025-06-24 PROCEDURE — 6360000002 HC RX W HCPCS

## 2025-06-24 PROCEDURE — 2580000003 HC RX 258: Performed by: INTERNAL MEDICINE

## 2025-06-24 PROCEDURE — 6370000000 HC RX 637 (ALT 250 FOR IP): Performed by: INTERNAL MEDICINE

## 2025-06-24 PROCEDURE — 73560 X-RAY EXAM OF KNEE 1 OR 2: CPT

## 2025-06-24 PROCEDURE — G0378 HOSPITAL OBSERVATION PER HR: HCPCS

## 2025-06-24 PROCEDURE — C1776 JOINT DEVICE (IMPLANTABLE): HCPCS | Performed by: ORTHOPAEDIC SURGERY

## 2025-06-24 PROCEDURE — 6370000000 HC RX 637 (ALT 250 FOR IP)

## 2025-06-24 PROCEDURE — 7100000000 HC PACU RECOVERY - FIRST 15 MIN: Performed by: ORTHOPAEDIC SURGERY

## 2025-06-24 PROCEDURE — 71045 X-RAY EXAM CHEST 1 VIEW: CPT

## 2025-06-24 PROCEDURE — 2500000003 HC RX 250 WO HCPCS: Performed by: ORTHOPAEDIC SURGERY

## 2025-06-24 PROCEDURE — 82947 ASSAY GLUCOSE BLOOD QUANT: CPT

## 2025-06-24 PROCEDURE — 2580000003 HC RX 258: Performed by: NURSE PRACTITIONER

## 2025-06-24 PROCEDURE — 3600000014 HC SURGERY LEVEL 4 ADDTL 15MIN: Performed by: ORTHOPAEDIC SURGERY

## 2025-06-24 PROCEDURE — 85025 COMPLETE CBC W/AUTO DIFF WBC: CPT

## 2025-06-24 PROCEDURE — 82248 BILIRUBIN DIRECT: CPT

## 2025-06-24 PROCEDURE — 6360000002 HC RX W HCPCS: Performed by: ORTHOPAEDIC SURGERY

## 2025-06-24 PROCEDURE — 97165 OT EVAL LOW COMPLEX 30 MIN: CPT | Performed by: OCCUPATIONAL THERAPIST

## 2025-06-24 PROCEDURE — 6360000002 HC RX W HCPCS: Performed by: NURSE PRACTITIONER

## 2025-06-24 PROCEDURE — 2709999900 HC NON-CHARGEABLE SUPPLY: Performed by: ORTHOPAEDIC SURGERY

## 2025-06-24 PROCEDURE — 3600000004 HC SURGERY LEVEL 4 BASE: Performed by: ORTHOPAEDIC SURGERY

## 2025-06-24 PROCEDURE — 97161 PT EVAL LOW COMPLEX 20 MIN: CPT | Performed by: PHYSICAL THERAPIST

## 2025-06-24 PROCEDURE — 6360000002 HC RX W HCPCS: Performed by: NURSE ANESTHETIST, CERTIFIED REGISTERED

## 2025-06-24 PROCEDURE — 80048 BASIC METABOLIC PNL TOTAL CA: CPT

## 2025-06-24 PROCEDURE — 94760 N-INVAS EAR/PLS OXIMETRY 1: CPT

## 2025-06-24 PROCEDURE — 2580000003 HC RX 258

## 2025-06-24 PROCEDURE — 7100000001 HC PACU RECOVERY - ADDTL 15 MIN: Performed by: ORTHOPAEDIC SURGERY

## 2025-06-24 PROCEDURE — 80053 COMPREHEN METABOLIC PANEL: CPT

## 2025-06-24 PROCEDURE — 93005 ELECTROCARDIOGRAM TRACING: CPT | Performed by: INTERNAL MEDICINE

## 2025-06-24 PROCEDURE — 3700000000 HC ANESTHESIA ATTENDED CARE: Performed by: ORTHOPAEDIC SURGERY

## 2025-06-24 PROCEDURE — C1713 ANCHOR/SCREW BN/BN,TIS/BN: HCPCS | Performed by: ORTHOPAEDIC SURGERY

## 2025-06-24 PROCEDURE — 36415 COLL VENOUS BLD VENIPUNCTURE: CPT

## 2025-06-24 PROCEDURE — 94640 AIRWAY INHALATION TREATMENT: CPT

## 2025-06-24 PROCEDURE — 2580000003 HC RX 258: Performed by: NURSE ANESTHETIST, CERTIFIED REGISTERED

## 2025-06-24 PROCEDURE — 64486 TAP BLOCK UNIL BY INJECTION: CPT | Performed by: NURSE ANESTHETIST, CERTIFIED REGISTERED

## 2025-06-24 PROCEDURE — 6370000000 HC RX 637 (ALT 250 FOR IP): Performed by: NURSE PRACTITIONER

## 2025-06-24 PROCEDURE — 84132 ASSAY OF SERUM POTASSIUM: CPT

## 2025-06-24 PROCEDURE — 27447 TOTAL KNEE ARTHROPLASTY: CPT | Performed by: PHYSICIAN ASSISTANT

## 2025-06-24 PROCEDURE — 2580000003 HC RX 258: Performed by: ORTHOPAEDIC SURGERY

## 2025-06-24 PROCEDURE — 2500000003 HC RX 250 WO HCPCS: Performed by: NURSE ANESTHETIST, CERTIFIED REGISTERED

## 2025-06-24 PROCEDURE — 27447 TOTAL KNEE ARTHROPLASTY: CPT | Performed by: ORTHOPAEDIC SURGERY

## 2025-06-24 PROCEDURE — 83735 ASSAY OF MAGNESIUM: CPT

## 2025-06-24 PROCEDURE — 3700000001 HC ADD 15 MINUTES (ANESTHESIA): Performed by: ORTHOPAEDIC SURGERY

## 2025-06-24 DEVICE — IMPLANTABLE DEVICE: Type: IMPLANTABLE DEVICE | Site: KNEE | Status: FUNCTIONAL

## 2025-06-24 DEVICE — COMPONENT PAT 3 PEG RND PRI STD CEM N POR W/O XR WIRE 26MM: Type: IMPLANTABLE DEVICE | Site: KNEE | Status: FUNCTIONAL

## 2025-06-24 RX ORDER — IPRATROPIUM BROMIDE AND ALBUTEROL SULFATE 2.5; .5 MG/3ML; MG/3ML
1 SOLUTION RESPIRATORY (INHALATION)
Status: DISCONTINUED | OUTPATIENT
Start: 2025-06-24 | End: 2025-06-24

## 2025-06-24 RX ORDER — HYDROCODONE BITARTRATE AND ACETAMINOPHEN 5; 325 MG/1; MG/1
1 TABLET ORAL EVERY 4 HOURS PRN
Qty: 40 TABLET | Refills: 0 | Status: SHIPPED | OUTPATIENT
Start: 2025-06-24 | End: 2025-07-01

## 2025-06-24 RX ORDER — MORPHINE SULFATE 4 MG/ML
4 INJECTION, SOLUTION INTRAMUSCULAR; INTRAVENOUS
Status: DISCONTINUED | OUTPATIENT
Start: 2025-06-24 | End: 2025-06-24

## 2025-06-24 RX ORDER — LEVOTHYROXINE SODIUM 100 UG/1
100 TABLET ORAL DAILY
Status: DISCONTINUED | OUTPATIENT
Start: 2025-06-25 | End: 2025-06-25 | Stop reason: HOSPADM

## 2025-06-24 RX ORDER — MUPIROCIN 2 %
OINTMENT (GRAM) TOPICAL 3 TIMES DAILY
Status: DISCONTINUED | OUTPATIENT
Start: 2025-06-24 | End: 2025-06-24

## 2025-06-24 RX ORDER — IPRATROPIUM BROMIDE AND ALBUTEROL SULFATE 2.5; .5 MG/3ML; MG/3ML
1 SOLUTION RESPIRATORY (INHALATION)
Status: DISCONTINUED | OUTPATIENT
Start: 2025-06-24 | End: 2025-06-25 | Stop reason: HOSPADM

## 2025-06-24 RX ORDER — SODIUM CHLORIDE 9 MG/ML
INJECTION, SOLUTION INTRAVENOUS PRN
Status: DISCONTINUED | OUTPATIENT
Start: 2025-06-24 | End: 2025-06-24 | Stop reason: HOSPADM

## 2025-06-24 RX ORDER — MECLIZINE HCL 12.5 MG 12.5 MG/1
25 TABLET ORAL 3 TIMES DAILY PRN
Status: DISCONTINUED | OUTPATIENT
Start: 2025-06-24 | End: 2025-06-25 | Stop reason: HOSPADM

## 2025-06-24 RX ORDER — CALCIUM GLUCONATE 20 MG/ML
1000 INJECTION, SOLUTION INTRAVENOUS ONCE
Status: COMPLETED | OUTPATIENT
Start: 2025-06-24 | End: 2025-06-24

## 2025-06-24 RX ORDER — SODIUM CHLORIDE 0.9 % (FLUSH) 0.9 %
5-40 SYRINGE (ML) INJECTION PRN
Status: DISCONTINUED | OUTPATIENT
Start: 2025-06-24 | End: 2025-06-24 | Stop reason: HOSPADM

## 2025-06-24 RX ORDER — DEXMEDETOMIDINE HYDROCHLORIDE 100 UG/ML
INJECTION, SOLUTION INTRAVENOUS
Status: DISCONTINUED | OUTPATIENT
Start: 2025-06-24 | End: 2025-06-24 | Stop reason: SDUPTHER

## 2025-06-24 RX ORDER — VANCOMYCIN HYDROCHLORIDE 1 G/20ML
INJECTION, POWDER, LYOPHILIZED, FOR SOLUTION INTRAVENOUS PRN
Status: DISCONTINUED | OUTPATIENT
Start: 2025-06-24 | End: 2025-06-24 | Stop reason: ALTCHOICE

## 2025-06-24 RX ORDER — ONDANSETRON 2 MG/ML
4 INJECTION INTRAMUSCULAR; INTRAVENOUS EVERY 6 HOURS PRN
Status: DISCONTINUED | OUTPATIENT
Start: 2025-06-24 | End: 2025-06-25 | Stop reason: HOSPADM

## 2025-06-24 RX ORDER — DOCUSATE SODIUM 100 MG/1
100 CAPSULE, LIQUID FILLED ORAL DAILY
Status: DISCONTINUED | OUTPATIENT
Start: 2025-06-24 | End: 2025-06-25 | Stop reason: HOSPADM

## 2025-06-24 RX ORDER — MORPHINE SULFATE 2 MG/ML
2 INJECTION, SOLUTION INTRAMUSCULAR; INTRAVENOUS EVERY 5 MIN PRN
Status: DISCONTINUED | OUTPATIENT
Start: 2025-06-24 | End: 2025-06-24 | Stop reason: HOSPADM

## 2025-06-24 RX ORDER — HYDROCHLOROTHIAZIDE 12.5 MG/1
12.5 TABLET ORAL EVERY MORNING
Status: DISCONTINUED | OUTPATIENT
Start: 2025-06-25 | End: 2025-06-25 | Stop reason: HOSPADM

## 2025-06-24 RX ORDER — DEXAMETHASONE SODIUM PHOSPHATE 10 MG/ML
INJECTION, SOLUTION INTRAMUSCULAR; INTRAVENOUS
Status: DISCONTINUED | OUTPATIENT
Start: 2025-06-24 | End: 2025-06-24 | Stop reason: SDUPTHER

## 2025-06-24 RX ORDER — ACETAMINOPHEN 325 MG/1
650 TABLET ORAL EVERY 4 HOURS PRN
Status: DISCONTINUED | OUTPATIENT
Start: 2025-06-24 | End: 2025-06-25 | Stop reason: HOSPADM

## 2025-06-24 RX ORDER — BUPIVACAINE HYDROCHLORIDE 5 MG/ML
INJECTION, SOLUTION EPIDURAL; INTRACAUDAL; PERINEURAL
Status: DISCONTINUED | OUTPATIENT
Start: 2025-06-24 | End: 2025-06-24 | Stop reason: SDUPTHER

## 2025-06-24 RX ORDER — GLUCAGON 1 MG/ML
1 KIT INJECTION PRN
Status: DISCONTINUED | OUTPATIENT
Start: 2025-06-24 | End: 2025-06-25 | Stop reason: HOSPADM

## 2025-06-24 RX ORDER — TRIAMTERENE AND HYDROCHLOROTHIAZIDE 37.5; 25 MG/1; MG/1
1 CAPSULE ORAL DAILY
Status: DISCONTINUED | OUTPATIENT
Start: 2025-06-24 | End: 2025-06-25 | Stop reason: HOSPADM

## 2025-06-24 RX ORDER — DIPHENHYDRAMINE HYDROCHLORIDE 50 MG/ML
12.5 INJECTION, SOLUTION INTRAMUSCULAR; INTRAVENOUS
Status: DISCONTINUED | OUTPATIENT
Start: 2025-06-24 | End: 2025-06-24 | Stop reason: HOSPADM

## 2025-06-24 RX ORDER — SODIUM CHLORIDE 0.9 % (FLUSH) 0.9 %
5-40 SYRINGE (ML) INJECTION EVERY 12 HOURS SCHEDULED
Status: DISCONTINUED | OUTPATIENT
Start: 2025-06-24 | End: 2025-06-24 | Stop reason: HOSPADM

## 2025-06-24 RX ORDER — PHENYLEPHRINE HYDROCHLORIDE 10 MG/ML
INJECTION INTRAVENOUS
Status: DISCONTINUED | OUTPATIENT
Start: 2025-06-24 | End: 2025-06-24 | Stop reason: SDUPTHER

## 2025-06-24 RX ORDER — HYDROCODONE BITARTRATE AND ACETAMINOPHEN 5; 325 MG/1; MG/1
1 TABLET ORAL EVERY 4 HOURS PRN
Status: DISCONTINUED | OUTPATIENT
Start: 2025-06-24 | End: 2025-06-25 | Stop reason: HOSPADM

## 2025-06-24 RX ORDER — ONDANSETRON 2 MG/ML
4 INJECTION INTRAMUSCULAR; INTRAVENOUS PRN
Status: DISCONTINUED | OUTPATIENT
Start: 2025-06-24 | End: 2025-06-24 | Stop reason: HOSPADM

## 2025-06-24 RX ORDER — SODIUM CHLORIDE, SODIUM LACTATE, POTASSIUM CHLORIDE, CALCIUM CHLORIDE 600; 310; 30; 20 MG/100ML; MG/100ML; MG/100ML; MG/100ML
INJECTION, SOLUTION INTRAVENOUS CONTINUOUS
Status: DISCONTINUED | OUTPATIENT
Start: 2025-06-24 | End: 2025-06-24 | Stop reason: HOSPADM

## 2025-06-24 RX ORDER — ESCITALOPRAM OXALATE 10 MG/1
30 TABLET ORAL DAILY
Status: DISCONTINUED | OUTPATIENT
Start: 2025-06-24 | End: 2025-06-25 | Stop reason: HOSPADM

## 2025-06-24 RX ORDER — TRANEXAMIC ACID 100 MG/ML
INJECTION, SOLUTION INTRAVENOUS PRN
Status: DISCONTINUED | OUTPATIENT
Start: 2025-06-24 | End: 2025-06-24 | Stop reason: ALTCHOICE

## 2025-06-24 RX ORDER — IPRATROPIUM BROMIDE AND ALBUTEROL SULFATE 2.5; .5 MG/3ML; MG/3ML
1 SOLUTION RESPIRATORY (INHALATION) EVERY 4 HOURS PRN
Status: DISCONTINUED | OUTPATIENT
Start: 2025-06-24 | End: 2025-06-25 | Stop reason: HOSPADM

## 2025-06-24 RX ORDER — CEFAZOLIN SODIUM 1 G/3ML
INJECTION, POWDER, FOR SOLUTION INTRAMUSCULAR; INTRAVENOUS
Status: DISCONTINUED | OUTPATIENT
Start: 2025-06-24 | End: 2025-06-24 | Stop reason: SDUPTHER

## 2025-06-24 RX ORDER — DEXAMETHASONE SODIUM PHOSPHATE 4 MG/ML
INJECTION, SOLUTION INTRA-ARTICULAR; INTRALESIONAL; INTRAMUSCULAR; INTRAVENOUS; SOFT TISSUE
Status: DISCONTINUED | OUTPATIENT
Start: 2025-06-24 | End: 2025-06-24 | Stop reason: SDUPTHER

## 2025-06-24 RX ORDER — CYCLOBENZAPRINE HCL 10 MG
10 TABLET ORAL 3 TIMES DAILY PRN
Qty: 40 TABLET | Refills: 0 | Status: SHIPPED | OUTPATIENT
Start: 2025-06-24 | End: 2025-07-07

## 2025-06-24 RX ORDER — FUROSEMIDE 10 MG/ML
40 INJECTION INTRAMUSCULAR; INTRAVENOUS ONCE
Status: COMPLETED | OUTPATIENT
Start: 2025-06-24 | End: 2025-06-24

## 2025-06-24 RX ORDER — LOSARTAN POTASSIUM 50 MG/1
50 TABLET ORAL DAILY
Status: DISCONTINUED | OUTPATIENT
Start: 2025-06-24 | End: 2025-06-25 | Stop reason: HOSPADM

## 2025-06-24 RX ORDER — KETOROLAC TROMETHAMINE 30 MG/ML
15 INJECTION, SOLUTION INTRAMUSCULAR; INTRAVENOUS EVERY 6 HOURS
Status: DISCONTINUED | OUTPATIENT
Start: 2025-06-24 | End: 2025-06-25 | Stop reason: HOSPADM

## 2025-06-24 RX ORDER — PROPRANOLOL HYDROCHLORIDE 60 MG/1
120 CAPSULE, EXTENDED RELEASE ORAL DAILY
Status: DISCONTINUED | OUTPATIENT
Start: 2025-06-24 | End: 2025-06-25 | Stop reason: HOSPADM

## 2025-06-24 RX ORDER — MORPHINE SULFATE 2 MG/ML
2 INJECTION, SOLUTION INTRAMUSCULAR; INTRAVENOUS
Status: DISCONTINUED | OUTPATIENT
Start: 2025-06-24 | End: 2025-06-24

## 2025-06-24 RX ORDER — CYCLOBENZAPRINE HCL 10 MG
10 TABLET ORAL 3 TIMES DAILY PRN
Status: DISCONTINUED | OUTPATIENT
Start: 2025-06-24 | End: 2025-06-25 | Stop reason: HOSPADM

## 2025-06-24 RX ORDER — ONDANSETRON 2 MG/ML
INJECTION INTRAMUSCULAR; INTRAVENOUS
Status: DISCONTINUED | OUTPATIENT
Start: 2025-06-24 | End: 2025-06-24 | Stop reason: SDUPTHER

## 2025-06-24 RX ORDER — INSULIN GLARGINE 100 [IU]/ML
5 INJECTION, SOLUTION SUBCUTANEOUS NIGHTLY
Status: DISCONTINUED | OUTPATIENT
Start: 2025-06-24 | End: 2025-06-25 | Stop reason: HOSPADM

## 2025-06-24 RX ORDER — DEXTROSE MONOHYDRATE 100 MG/ML
INJECTION, SOLUTION INTRAVENOUS CONTINUOUS PRN
Status: DISCONTINUED | OUTPATIENT
Start: 2025-06-24 | End: 2025-06-25 | Stop reason: HOSPADM

## 2025-06-24 RX ORDER — PROPOFOL 10 MG/ML
INJECTION, EMULSION INTRAVENOUS
Status: DISCONTINUED | OUTPATIENT
Start: 2025-06-24 | End: 2025-06-24 | Stop reason: SDUPTHER

## 2025-06-24 RX ORDER — TRANEXAMIC ACID 100 MG/ML
INJECTION, SOLUTION INTRAVENOUS
Status: DISCONTINUED | OUTPATIENT
Start: 2025-06-24 | End: 2025-06-24 | Stop reason: SDUPTHER

## 2025-06-24 RX ORDER — ASPIRIN 325 MG
325 TABLET ORAL DAILY
Status: DISCONTINUED | OUTPATIENT
Start: 2025-06-25 | End: 2025-06-25 | Stop reason: HOSPADM

## 2025-06-24 RX ORDER — 0.9 % SODIUM CHLORIDE 0.9 %
1000 INTRAVENOUS SOLUTION INTRAVENOUS ONCE
Status: COMPLETED | OUTPATIENT
Start: 2025-06-24 | End: 2025-06-24

## 2025-06-24 RX ORDER — FENTANYL CITRATE 50 UG/ML
25 INJECTION, SOLUTION INTRAMUSCULAR; INTRAVENOUS EVERY 5 MIN PRN
Status: DISCONTINUED | OUTPATIENT
Start: 2025-06-24 | End: 2025-06-24 | Stop reason: HOSPADM

## 2025-06-24 RX ORDER — GLYCOPYRROLATE 0.2 MG/ML
INJECTION INTRAMUSCULAR; INTRAVENOUS
Status: DISCONTINUED | OUTPATIENT
Start: 2025-06-24 | End: 2025-06-24 | Stop reason: SDUPTHER

## 2025-06-24 RX ORDER — SODIUM CHLORIDE 9 MG/ML
INJECTION, SOLUTION INTRAVENOUS CONTINUOUS
Status: DISCONTINUED | OUTPATIENT
Start: 2025-06-24 | End: 2025-06-25

## 2025-06-24 RX ORDER — ASPIRIN 325 MG
325 TABLET ORAL DAILY
Qty: 30 TABLET | Refills: 0 | Status: SHIPPED | OUTPATIENT
Start: 2025-06-24 | End: 2025-07-24

## 2025-06-24 RX ORDER — INSULIN LISPRO 100 [IU]/ML
0-8 INJECTION, SOLUTION INTRAVENOUS; SUBCUTANEOUS
Status: DISCONTINUED | OUTPATIENT
Start: 2025-06-24 | End: 2025-06-25 | Stop reason: HOSPADM

## 2025-06-24 RX ORDER — TRIAMTERENE AND HYDROCHLOROTHIAZIDE 37.5; 25 MG/1; MG/1
1 TABLET ORAL DAILY
Status: DISCONTINUED | OUTPATIENT
Start: 2025-06-24 | End: 2025-06-24 | Stop reason: CLARIF

## 2025-06-24 RX ORDER — HYDROCODONE BITARTRATE AND ACETAMINOPHEN 5; 325 MG/1; MG/1
2 TABLET ORAL EVERY 4 HOURS PRN
Status: DISCONTINUED | OUTPATIENT
Start: 2025-06-24 | End: 2025-06-25 | Stop reason: HOSPADM

## 2025-06-24 RX ORDER — NALOXONE HYDROCHLORIDE 0.4 MG/ML
INJECTION, SOLUTION INTRAMUSCULAR; INTRAVENOUS; SUBCUTANEOUS PRN
Status: DISCONTINUED | OUTPATIENT
Start: 2025-06-24 | End: 2025-06-24 | Stop reason: HOSPADM

## 2025-06-24 RX ORDER — SODIUM CHLORIDE, SODIUM LACTATE, POTASSIUM CHLORIDE, CALCIUM CHLORIDE 600; 310; 30; 20 MG/100ML; MG/100ML; MG/100ML; MG/100ML
INJECTION, SOLUTION INTRAVENOUS
Status: DISCONTINUED | OUTPATIENT
Start: 2025-06-24 | End: 2025-06-24 | Stop reason: SDUPTHER

## 2025-06-24 RX ADMIN — PHENYLEPHRINE HYDROCHLORIDE 100 MCG: 10 INJECTION INTRAVENOUS at 08:24

## 2025-06-24 RX ADMIN — DEXAMETHASONE SODIUM PHOSPHATE 4 MG: 4 INJECTION, SOLUTION INTRAMUSCULAR; INTRAVENOUS at 08:11

## 2025-06-24 RX ADMIN — SODIUM CHLORIDE: 0.9 INJECTION, SOLUTION INTRAVENOUS at 22:10

## 2025-06-24 RX ADMIN — HYDROMORPHONE HYDROCHLORIDE 0.5 MG: 1 INJECTION, SOLUTION INTRAMUSCULAR; INTRAVENOUS; SUBCUTANEOUS at 08:10

## 2025-06-24 RX ADMIN — CEFAZOLIN 2 G: 1 INJECTION, POWDER, FOR SOLUTION INTRAMUSCULAR; INTRAVENOUS at 08:07

## 2025-06-24 RX ADMIN — GLYCOPYRROLATE 0.2 MG: 0.2 INJECTION INTRAMUSCULAR; INTRAVENOUS at 08:22

## 2025-06-24 RX ADMIN — IPRATROPIUM BROMIDE AND ALBUTEROL SULFATE 1 DOSE: .5; 2.5 SOLUTION RESPIRATORY (INHALATION) at 20:14

## 2025-06-24 RX ADMIN — FUROSEMIDE 40 MG: 10 INJECTION, SOLUTION INTRAMUSCULAR; INTRAVENOUS at 21:22

## 2025-06-24 RX ADMIN — SODIUM CHLORIDE, POTASSIUM CHLORIDE, SODIUM LACTATE AND CALCIUM CHLORIDE: 600; 310; 30; 20 INJECTION, SOLUTION INTRAVENOUS at 07:59

## 2025-06-24 RX ADMIN — SODIUM CHLORIDE: 0.9 INJECTION, SOLUTION INTRAVENOUS at 10:28

## 2025-06-24 RX ADMIN — ONDANSETRON 4 MG: 2 INJECTION, SOLUTION INTRAMUSCULAR; INTRAVENOUS at 08:11

## 2025-06-24 RX ADMIN — HYDROMORPHONE HYDROCHLORIDE 0.5 MG: 1 INJECTION, SOLUTION INTRAMUSCULAR; INTRAVENOUS; SUBCUTANEOUS at 08:31

## 2025-06-24 RX ADMIN — CALCIUM GLUCONATE 1000 MG: 20 INJECTION, SOLUTION INTRAVENOUS at 21:25

## 2025-06-24 RX ADMIN — SODIUM CHLORIDE 1000 ML: 0.9 INJECTION, SOLUTION INTRAVENOUS at 15:29

## 2025-06-24 RX ADMIN — GLYCOPYRROLATE 0.1 MG: 0.2 INJECTION INTRAMUSCULAR; INTRAVENOUS at 08:24

## 2025-06-24 RX ADMIN — INSULIN LISPRO 4 UNITS: 100 INJECTION, SOLUTION INTRAVENOUS; SUBCUTANEOUS at 21:08

## 2025-06-24 RX ADMIN — ESCITALOPRAM OXALATE 30 MG: 10 TABLET ORAL at 13:57

## 2025-06-24 RX ADMIN — SODIUM POLYSTYRENE SULFONATE 45 G: 15 SUSPENSION ORAL; RECTAL at 21:04

## 2025-06-24 RX ADMIN — IPRATROPIUM BROMIDE AND ALBUTEROL SULFATE 1 DOSE: .5; 2.5 SOLUTION RESPIRATORY (INHALATION) at 12:14

## 2025-06-24 RX ADMIN — Medication 2000 MG: at 20:01

## 2025-06-24 RX ADMIN — SODIUM CHLORIDE, SODIUM LACTATE, POTASSIUM CHLORIDE, AND CALCIUM CHLORIDE: .6; .31; .03; .02 INJECTION, SOLUTION INTRAVENOUS at 07:21

## 2025-06-24 RX ADMIN — DEXMEDETOMIDINE HYDROCHLORIDE 20 MCG: 100 INJECTION, SOLUTION INTRAVENOUS at 08:03

## 2025-06-24 RX ADMIN — KETOROLAC TROMETHAMINE 15 MG: 30 INJECTION, SOLUTION INTRAMUSCULAR at 13:58

## 2025-06-24 RX ADMIN — PROPOFOL 150 MCG/KG/MIN: 10 INJECTION, EMULSION INTRAVENOUS at 08:05

## 2025-06-24 RX ADMIN — TRANEXAMIC ACID 1000 MG: 100 INJECTION, SOLUTION INTRAVENOUS at 08:10

## 2025-06-24 RX ADMIN — DEXMEDETOMIDINE HYDROCHLORIDE 4 MCG: 100 INJECTION, SOLUTION INTRAVENOUS at 08:32

## 2025-06-24 RX ADMIN — Medication 50 MG: at 08:10

## 2025-06-24 RX ADMIN — DOCUSATE SODIUM 100 MG: 100 CAPSULE, LIQUID FILLED ORAL at 14:00

## 2025-06-24 RX ADMIN — BUPIVACAINE HYDROCHLORIDE 20 ML: 5 INJECTION, SOLUTION EPIDURAL; INTRACAUDAL; PERINEURAL at 08:05

## 2025-06-24 RX ADMIN — PROPOFOL 30 MG: 10 INJECTION, EMULSION INTRAVENOUS at 08:50

## 2025-06-24 RX ADMIN — KETOROLAC TROMETHAMINE 15 MG: 30 INJECTION, SOLUTION INTRAMUSCULAR at 20:01

## 2025-06-24 RX ADMIN — DEXAMETHASONE SODIUM PHOSPHATE 10 MG: 10 INJECTION, SOLUTION INTRAMUSCULAR; INTRAVENOUS at 08:05

## 2025-06-24 RX ADMIN — INSULIN GLARGINE 5 UNITS: 100 INJECTION, SOLUTION SUBCUTANEOUS at 21:08

## 2025-06-24 RX ADMIN — INSULIN HUMAN 10 UNITS: 100 INJECTION, SOLUTION PARENTERAL at 21:00

## 2025-06-24 RX ADMIN — DEXTROSE 250 ML: 10 SOLUTION INTRAVENOUS at 21:03

## 2025-06-24 ASSESSMENT — PAIN SCALES - GENERAL
PAINLEVEL_OUTOF10: 0
PAINLEVEL_OUTOF10: 0

## 2025-06-24 ASSESSMENT — PAIN - FUNCTIONAL ASSESSMENT
PAIN_FUNCTIONAL_ASSESSMENT: 0-10
PAIN_FUNCTIONAL_ASSESSMENT: ADULT NONVERBAL PAIN SCALE (NPVS)

## 2025-06-24 NOTE — PROGRESS NOTES
Occupational Therapy  Facility/Department: [facility]  Occupational Therapy Initial Assessment    Name: Sp Jimenez  : 1945  MRN: [id number]  Date of Service: 2025    Discharge Recommendations:  Home with assist PRN     Patient Diagnosis(es): The encounter diagnosis was Status post left knee replacement.  Past Medical History:  has a past medical history of Chronic kidney disease, Diabetes mellitus (HCC), HTN (hypertension), and Osteoporosis.  Past Surgical History:  has a past surgical history that includes External ear surgery; Upper gastrointestinal endoscopy (N/A, 2023); Colonoscopy (N/A, 2023); Total knee arthroplasty (Right, 2024); and Total knee arthroplasty (Left, 2025).    Treatment Diagnosis: LTKA      Assessment  Performance deficits / Impairments: Decreased functional mobility ;Decreased ADL status;Decreased ROM;Decreased high-level IADLs;Decreased balance  Treatment Diagnosis: LTKA  Prognosis: Good  Decision Making: Low Complexity        Plan  Occupational Therapy Plan  Times Per Week: 1-2  Current Treatment Recommendations: Self-Care / ADL, Patient/Caregiver education & training, Equipment evaluation, education, & procurement, Functional mobility training    Restrictions  Restrictions/Precautions  Restrictions/Precautions: Weight Bearing  Lower Extremity Weight Bearing Restrictions  Left Lower Extremity Weight Bearing: Weight Bearing As Tolerated    Subjective  General  Chart Reviewed: Yes  Patient assessed for rehabilitation services?: Yes  Family / Caregiver Present: Yes  Referring Practitioner: NY Rodriguez  Diagnosis: LTKA     Social/Functional History  Social/Functional History  Lives With: Family  Type of Home: House  Home Layout: Two level, Able to Live on Main level with bedroom/bathroom  Home Access: Stairs to enter with rails  Entrance Stairs - Number of Steps: 3  Entrance Stairs - Rails: Both  Bathroom Shower/Tub: Walk-in shower  Bathroom Toilet:

## 2025-06-24 NOTE — OP NOTE
Operative Note      Patient: Sp Jimenez  YOB: 1945  MRN: [id number]    Date of Procedure: 6/24/2025    Pre-Op Diagnosis Codes:      * Primary osteoarthritis of left knee [M17.12]     * Chronic pain of left knee [M25.562, G89.29]    Post-Op Diagnosis: Same       Procedure(s):  Left Total knee arthroplasty    Surgeon(s):  Williams Guerrero MD    Assistant:   Physician Assistant: Jamel Jones PA-C    The physician assistant was present for the entirety of the procedure and vital for the performance of the procedure.  He assisted with positioning, prepping, draping of the patient before the procedure and instrument manipulation, extremity repositioning  as well as wound closure, dressing application after the procedure. Please note that no intern, resident, or other hospital staff was available to assist during the surgery    Anesthesia: General    Estimated Blood Loss (mL): 200     Complications: None    Specimens:   * No specimens in log *    Implants:  Implant Name Type Inv. Item Serial No.  Lot No. LRB No. Used Action   INSERT TIB CR 2 11 MM TRULIANT - [id number]  INSERT TIB CR 2 11 MM TRULIANT [id number] [employer]  Left 1 Implanted   COMPONENT FEM SZ 2 LT CRUC RET AKIKO TRULIANT - [id number]  COMPONENT FEM SZ 2 LT CRUC RET AKIKO TRULIANT [id number] [employer]  Left 1 Implanted   TRAY TIB SZ 2F 2T FIT [employer]KO TRULIANT - [id number]  TRAY TIB SZ 2F 2T FIT AKIKO TRULIANT [id number] [employer]  Left 1 Implanted   COMPONENT PAT 3 PEG RND FADI STD AKIKO N POR W/O XR WIRE 26MM - [id number]  COMPONENT PAT 3 PEG RND FADI STD AKIKO N POR W/O XR WIRE 26MM [id number] [employer]  Left 1 Implanted         Drains: * No LDAs found *    Findings:  Infection Present At Time Of Surgery (PATOS) (choose all levels that have infection present):  No infection present  Other Findings: confirmed    Detailed Description of Procedure:        INDICATIONS: The patient is a 80 y.o.-year-old with Left  knee pain for quite  sometime. It affects the patient's day-to-day activities. The patient occasionally has to use assist devices to get up from a seated position. Pain is localized along the joint line. The patient has tried activity modification. The patient has had pain medications. The patient has had corticosteroid injections as well.  There has been attempts with physical therapy and have not relieved the symptoms The physical exam elicits  pain along the joint line and crepitus with range of motion. Range of motion is slightly decrease. The x-rays showed significant osteoarthritis. Discussed the option of total knee replacement. We have elected to proceed.       NARRATIVE: The patient was taken to the operating room, underwent a general anesthetic. The Left  lower extremity was prepped and draped in normal sterile fashion. Timeout was taken. Consent was confirmed. Ancef 2 grams was infused. We started with a midline incision with skin knife followed by electrocautery down to the extensor mechanism. We then did a medial capsulotomy with a subvastus approach, lifting of the vastus medialis. Subluxed the patella laterally. We removed the patellar fat pad and removed the remaining meniscus. We performed appropriate elevation up the medial complex.  We then placed the distal femoral cutting guide in position, made our distal femoral cut taking off 10 mm. We placed the distal femoral sizing guide selected to a size 2.  The 2 cutting guide was put into position and made our anterior, posterior and chamfer cuts.  We then addressed the tibia, subluxed it anterior and removed soft tissues.  We then used the intermedullary guide and took about 2 mm off the most effected side. It was sized to be a size 2, reamed and broached. We looked for posterior osteophytes on the femur. Those were removed. We then placed trial tibial and femoral components with a 11 mm bearing surface. With a full extension it was stable. We resurfaced the patella,

## 2025-06-24 NOTE — PROGRESS NOTES
Physical Therapy  Facility/Department: [facility]  Physical Therapy Initial Assessment    Name: Sp Jimenez  : 1945  MRN: [id number]  Date of Service: 2025    Discharge Recommendations:  Continue to assess pending progress, Outpatient PT          Patient Diagnosis(es): The encounter diagnosis was Status post left knee replacement.  Past Medical History:  has a past medical history of Chronic kidney disease, Diabetes mellitus (HCC), HTN (hypertension), and Osteoporosis.  Past Surgical History:  has a past surgical history that includes External ear surgery; Upper gastrointestinal endoscopy (N/A, 2023); Colonoscopy (N/A, 2023); Total knee arthroplasty (Right, 2024); and Total knee arthroplasty (Left, 2025).    Assessment  Body Structures, Functions, Activity Limitations Requiring Skilled Therapeutic Intervention: Decreased functional mobility ;Decreased ADL status;Decreased ROM;Decreased balance;Decreased posture;Decreased endurance;Increased pain;Decreased strength  Therapy Prognosis: Good  Decision Making: Low Complexity  Activity Tolerance  Activity Tolerance: Patient tolerated evaluation without incident    Plan  Physical Therapy Plan  General Plan: 2 times a day 7 days a week  Current Treatment Recommendations: Strengthening, ROM, Balance training, Functional mobility training, Gait training, Stair training, Transfer training, Neuromuscular re-education, Safety education & training, Home exercise program, Return to work related activity  Safety Devices  Type of Devices: All fall risk precautions in place    Restrictions  Restrictions/Precautions  Restrictions/Precautions: Weight Bearing  Lower Extremity Weight Bearing Restrictions  Left Lower Extremity Weight Bearing: Weight Bearing As Tolerated     Subjective  General  Chart Reviewed: Yes  Patient assessed for rehabilitation services?: Yes  Subjective  Subjective: Patient in room with son.  Per Nursing--son is  interprettor.         Social/Functional History  Social/Functional History  Lives With: Family  Type of Home: House  Home Layout: Two level, Able to Live on Main level with bedroom/bathroom  Home Access: Stairs to enter with rails  Entrance Stairs - Number of Steps: 3  Entrance Stairs - Rails: Both  Bathroom Shower/Tub: Walk-in shower  Bathroom Toilet: Standard  Bathroom Equipment: Grab bars around toilet  Home Equipment: Walker - 4-Wheeled  Receives Help From: Family  Prior Level of Assist for ADLs: Independent  Prior Level of Assist for Homemaking: Independent  Prior Level of Assist for Transfers: Independent  Vision/Hearing       Cognition   Orientation  Overall Orientation Status: Within Functional Limits    Objective  Temp: 97.8 °F (36.6 °C)  Pulse: 74  Heart Rate Source: Monitor  Respirations: (!) 9  SpO2: 93 %  O2 Device: None (Room air)  BP: (!) 86/54  MAP (Calculated): 65  BP Location: Right upper arm  BP Method: Automatic  Patient Position: Supine                   Strength RLE  Comment: grossly 4/5  Strength LLE  Strength LLE: WFL           Bed mobility  Supine to Sit: Contact guard assistance  Sit to Supine: Contact guard assistance  Transfers  Sit to Stand: Stand by assistance  Stand to Sit: Stand by assistance  Ambulation  Surface: Level tile  Device: Rolling Walker  Assistance: Contact guard assistance  Gait Deviations: Slow Kari;Increased ANSHU;Decreased step length  Distance: 5ft             Goals  Short Term Goals  Time Frame for Short Term Goals: LOS  Short Term Goal 1: Bed mobilty with mod I  Short Term Goal 2: Transfers with mod  Short Term Goal 3: Amb x 50ft with RW x 1 with mod I  Patient Goals   Patient Goals : Return Home         Therapy Time   Individual Concurrent Group Co-treatment   Time In 1500         Time Out 1520         Minutes 20                 Mian Hurt, PT

## 2025-06-24 NOTE — H&P
needed. 6/19/25  Yes Sg Maguire MD   Lancets (ONETOUCH DELICA PLUS [id number]) MISC Use as directed 5/22/25  Yes Sg Maguire MD   propranolol (INDERAL LA) 120 MG extended release capsule Take 1 capsule by mouth daily 1/14/25  Yes Sg Maguire MD   levothyroxine (EUTHYROX) 100 MCG tablet Take 1 tablet by mouth Daily 1/2/25 9/29/25 Yes Sg Maguire MD   hydroCHLOROthiazide 12.5 MG capsule Take 1 capsule by mouth every morning 11/26/24  Yes Sg Maguire MD   losartan (COZAAR) 50 MG tablet Take 1 tablet by mouth daily 11/26/24  Yes Sg Maguire MD   meclizine (ANTIVERT) 25 MG tablet Take 1 tablet by mouth 3 times daily as needed for Dizziness 11/26/24  Yes Sg Maguire MD   Blood Glucose Monitoring Suppl (ONETOUCH VERIO FLEX SYSTEM) w/Device KIT USE AS DIRECTED twice a day 8/7/23  Yes ProviderHarper MD   blood glucose monitor kit and supplies Test 2 times a day   Onetouch 8/7/23  Yes Sg Maguire MD   fluocinonide (LIDEX) 0.05 % cream Apply topically 2 times daily. 5/27/25   Sg Maguire MD   Lancet Device MISC 1 Device by Does not apply route once for 1 dose 5/22/25 5/22/25  Sg Maguire MD   escitalopram (LEXAPRO) 20 MG tablet TAKE 1 & 1/2 (ONE & ONE-HALF) TABLETS BY MOUTH ONCE DAILY 5/6/25   Sg Maguire MD   neomycin-bacitracin-polymyxin (NEOSPORIN) 5-400-5000 ointment Apply topically 4 times daily. 2/6/25   Sg Maguire MD   Glycerin, PF, (BIOTRUE LUBRICANT) 0.5 % SOLN Use as directed 2/6/25   Sg Maguire MD   Handicap Placard MISC by Does not apply route Permanent 1/14/25   Sg Maguire MD   aspirin (VIOLETTA ASPIRIN) 325 MG tablet Take 1 tablet by mouth daily  Patient not taking: Reported on 6/24/2025 1/2/25 12/28/25  Sg Maguire MD   hydrocortisone 1 % ointment Apply topically 2 times daily 1/2/25   Sg Maguire MD   triamterene-hydroCHLOROthiazide (MAXZIDE-25) 37.5-25 MG per tablet Take 1 tablet by mouth daily 1/2/25   Sg Maguire MD   methylPREDNISolone (MEDROL, BILL,) 4 MG tablet Take by mouth.  Patient not taking: Reported on  BRITTANY Rodriguez - CNP on 6/24/2025 at 7:43 AM

## 2025-06-24 NOTE — PROGRESS NOTES
Patient has home medications that son refused to take home. None of these medications are being used but are locked up in patients room per patients request.

## 2025-06-24 NOTE — ANESTHESIA PROCEDURE NOTES
Peripheral Block    Patient location during procedure: OR  Reason for block: post-op pain management and at surgeon's request  Start time: 6/24/2025 8:05 AM  End time: 6/24/2025 8:08 AM  Staffing  Resident/CRNA: Ray Patel APRN - CRNA  Other anesthesia staff: Jeremy Garcia RN  Performed by: Ray Patel APRN - CRNA  Authorized by: Ray Patel APRN - CRNA    Preanesthetic Checklist  Completed: patient identified, IV checked, site marked, risks and benefits discussed, surgical/procedural consents, equipment checked, pre-op evaluation, timeout performed, anesthesia consent given, oxygen available and monitors applied/VS acknowledged  Peripheral Block   Patient position: supine  Prep: Betadine  Provider prep: mask  Patient monitoring: cardiac monitor, continuous pulse ox, frequent blood pressure checks, IV access, oxygen and continuous capnometry  Block type: Rectus sheath  Laterality: left  Injection technique: single-shot  Guidance: ultrasound guided  Local infiltration: bupivacaine  Infiltration strength: 0.5 %  Local infiltration: bupivacaine  Dose: 20 mL    Needle   Needle type: insulated echogenic nerve stimulator needle   Needle gauge: 22 G  Needle localization: anatomical landmarks and ultrasound guidance  Needle length: 8 cm  Assessment   Injection assessment: negative aspiration for heme, no paresthesia on injection, local visualized surrounding nerve on ultrasound and no intravascular symptoms  Slow fractionated injection: yes  Hemodynamics: stable  Outcomes: patient tolerated procedure well    Additional Notes  Procedure: Ultrasound guided Single-Shot Nerve Block   Reason: Per surgeon's request for Post-op Pain management  Performed by: NICO Garcia with wilver BAUTISTA    Patient Identified, monitors &equipment applied, pre-op evaluation completed, risks (including risk for fall, bleeding, infection, falls, foot-drop, toxicity, & nerve injury-less than 1%, ) and benefits

## 2025-06-24 NOTE — CONSULTS
Hospitalist Consult  Note    Patient:  Sp Jimenez     YOB: 1945    MRN: [id number]   Admit date: 6/24/2025     Acct: [id number]     PCP: Sg Maguire MD    CC--Interval History: POD 0 left TKA--for OA---6.24.2025---[place]    ASCVD--stable    HTN---106/69    K = 5.5 ---> IVF and recheck    CKD Stage 3b    Anemia---HGB = 10.2---expected acute blood loss anemia     Patient ebue-xxjajbpzbn-ltmqesmr-available records reviewed, including, but not limited to, OR reports--labs--imaging--office records---personal notes    All other ROS negative except noted in HPI    Diet:  ADULT DIET; Regular    Medications:  Scheduled Meds:   escitalopram  30 mg Oral Daily    [START ON 6/25/2025] hydroCHLOROthiazide  12.5 mg Oral QAM    [START ON 6/25/2025] levothyroxine  100 mcg Oral Daily    losartan  50 mg Oral Daily    propranolol  120 mg Oral Daily    ketorolac  15 mg IntraVENous Q6H    docusate sodium  100 mg Oral Daily    [START ON 6/25/2025] aspirin  325 mg Oral Daily    ceFAZolin  2,000 mg IntraVENous Q8H    triamterene-hydroCHLOROthiazide  1 capsule Oral Daily    ipratropium 0.5 mg-albuterol 2.5 mg  1 Dose Inhalation Q4H WA RT     Continuous Infusions:   sodium chloride Stopped (06/24/25 1445)    dextrose       PRN Meds:meclizine, cyclobenzaprine, ondansetron, HYDROcodone 5 mg - acetaminophen **OR** HYDROcodone 5 mg - acetaminophen, acetaminophen, HYDROmorphone, glucose, dextrose bolus **OR** dextrose bolus, glucagon (rDNA), dextrose, ipratropium 0.5 mg-albuterol 2.5 mg    Objective:  Labs:  CBC with Differential:    Lab Results   Component Value Date/Time    WBC 10.8 06/24/2025 12:08 PM    RBC 3.73 06/24/2025 12:08 PM    HGB 10.2 06/24/2025 12:08 PM    HCT 31.8 06/24/2025 12:08 PM     06/24/2025 12:08 PM    MCV 85.3 06/24/2025 12:08 PM    MCH 27.3 06/24/2025 12:08 PM    MCHC 32.1 06/24/2025 12:08 PM    RDW 15.9 06/24/2025 12:08 PM    LYMPHOPCT 14 06/24/2025 12:08 PM    MONOPCT 2 06/24/2025 12:08 PM    EOSPCT  1 06/24/2025 12:08 PM    BASOPCT 1 06/24/2025 12:08 PM    MONOSABS 0.16 06/24/2025 12:08 PM    LYMPHSABS 1.47 06/24/2025 12:08 PM    EOSABS 0.14 06/24/2025 12:08 PM    BASOSABS 0.05 06/24/2025 12:08 PM    DIFFTYPE NOT REPORTED 01/26/2021 09:42 AM     BMP:    Lab Results   Component Value Date/Time     06/24/2025 12:08 PM    K 5.5 06/24/2025 12:08 PM     06/24/2025 12:08 PM    CO2 23 06/24/2025 12:08 PM    BUN 30 06/24/2025 12:08 PM    CREATININE 1.5 06/24/2025 12:08 PM    CALCIUM 8.5 06/24/2025 12:08 PM    GFRAA 54 01/26/2021 09:42 AM    LABGLOM 35 06/24/2025 12:08 PM    LABGLOM 42 11/15/2023 01:24 PM    GLUCOSE 209 06/24/2025 12:08 PM           Physical Exam:  Vitals: BP (!) 108/54   Pulse 78   Temp 97.8 °F (36.6 °C) (Temporal)   Resp 15   Ht 1.6 m (5' 3\")   Wt 75.6 kg (166 lb 9.6 oz)   SpO2 93%   BMI 29.51 kg/m²   24 hour intake/output:  Intake/Output Summary (Last 24 hours) at 6/24/2025 1517  Last data filed at 6/24/2025 1501  Gross per 24 hour   Intake 1323.26 ml   Output --   Net 1323.26 ml     Last 3 weights:  Wt Readings from Last 3 Encounters:   06/24/25 75.6 kg (166 lb 9.6 oz)   06/09/25 69.4 kg (153 lb)   05/22/25 70.8 kg (156 lb)     HEENT: Normocephalic and Atraumatic  Neck: Supple, No Masses, Tenderness, Nodularity, and No Lymphadenopathy  Chest/Lungs: Clear to Auscultation without Rales, Rhonchi, or Wheezes  Cardiac: Regular Rate and Rhythm  GI/Abdomen: Bowel Sounds Present and Soft, Non-tender, without Guarding or Rebound Tenderness  : Not examined  EXT/Skin: sp left TKA, No Edema, No Cyanosis, and No Clubbing  Neuro: postoperative grogginess and No Localizing Signs/Symptoms      Assessment:    Principal Problem:    Localized osteoarthritis of left knee  Resolved Problems:    * No resolved hospital problems. *    MARIAJOSE RAMOS.        80 WF  [darrian Sparks NP--FP;  [facility]--[facility];  silvia Guerrero, Orthopedics]    FULL CODE     Anti-infectives:  -----    POD ____

## 2025-06-24 NOTE — CONSULTS
Session ID: [id number]  Session Duration: 1 minutes  Language: Portuguese   ID: #[id number]   Name: Cristina

## 2025-06-24 NOTE — ANESTHESIA POSTPROCEDURE EVALUATION
Department of Anesthesiology  Postprocedure Note    Patient: Sp Jimenez  MRN: [id number]  YOB: 1945  Date of evaluation: 6/24/2025    Procedure Summary       Date: 06/24/25 Room / Location: [address]    Anesthesia Start: 0758 Anesthesia Stop: 0910    Procedure: Left Total knee arthroplasty (Left) Diagnosis:       Primary osteoarthritis of left knee      Chronic pain of left knee      (Primary osteoarthritis of left knee [M17.12])      (Chronic pain of left knee [M25.562, G89.29])    Surgeons: Williams Guerrero MD Responsible Provider: Ray Patel APRN - CRNA    Anesthesia Type: General, TIVA ASA Status: 3            Anesthesia Type: General, TIVA    Alyssa Phase I: Alyssa Score: 10    Alyssa Phase II:      Anesthesia Post Evaluation    Patient location during evaluation: PACU  Level of consciousness: sleepy but conscious  Airway patency: patent  Nausea & Vomiting: no nausea and no vomiting  Cardiovascular status: hemodynamically stable  Respiratory status: spontaneous ventilation  Hydration status: stable  Multimodal analgesia pain management approach  Pain management: satisfactory to patient    No notable events documented.

## 2025-06-25 VITALS
TEMPERATURE: 97.5 F | SYSTOLIC BLOOD PRESSURE: 117 MMHG | HEIGHT: 63 IN | WEIGHT: 176.9 LBS | HEART RATE: 88 BPM | DIASTOLIC BLOOD PRESSURE: 60 MMHG | OXYGEN SATURATION: 94 % | BODY MASS INDEX: 31.34 KG/M2 | RESPIRATION RATE: 18 BRPM

## 2025-06-25 LAB
ANION GAP SERPL CALCULATED.3IONS-SCNC: 13 MMOL/L (ref 9–16)
BASOPHILS # BLD: <0.03 K/UL (ref 0–0.2)
BASOPHILS NFR BLD: 0 % (ref 0–2)
BUN SERPL-MCNC: 32 MG/DL (ref 8–23)
BUN/CREAT SERPL: 20 (ref 9–20)
CALCIUM SERPL-MCNC: 8.2 MG/DL (ref 8.6–10.4)
CHLORIDE SERPL-SCNC: 101 MMOL/L (ref 98–107)
CO2 SERPL-SCNC: 22 MMOL/L (ref 20–31)
CREAT SERPL-MCNC: 1.6 MG/DL (ref 0.6–0.9)
EKG ATRIAL RATE: 77 BPM
EKG P AXIS: 32 DEGREES
EKG P-R INTERVAL: 162 MS
EKG Q-T INTERVAL: 416 MS
EKG QRS DURATION: 82 MS
EKG QTC CALCULATION (BAZETT): 470 MS
EKG R AXIS: -7 DEGREES
EKG T AXIS: 76 DEGREES
EKG VENTRICULAR RATE: 77 BPM
EOSINOPHIL # BLD: <0.03 K/UL (ref 0–0.44)
EOSINOPHILS RELATIVE PERCENT: 0 % (ref 1–4)
ERYTHROCYTE [DISTWIDTH] IN BLOOD BY AUTOMATED COUNT: 15.8 % (ref 11.8–14.4)
GFR, ESTIMATED: 32 ML/MIN/1.73M2
GLUCOSE BLD-MCNC: 191 MG/DL (ref 65–105)
GLUCOSE SERPL-MCNC: 203 MG/DL (ref 74–99)
HCT VFR BLD AUTO: 26.5 % (ref 36.3–47.1)
HGB BLD-MCNC: 8.6 G/DL (ref 11.9–15.1)
IMM GRANULOCYTES # BLD AUTO: 0.06 K/UL (ref 0–0.3)
IMM GRANULOCYTES NFR BLD: 1 %
LYMPHOCYTES NFR BLD: 1.07 K/UL (ref 1.1–3.7)
LYMPHOCYTES RELATIVE PERCENT: 12 % (ref 24–43)
MCH RBC QN AUTO: 27.5 PG (ref 25.2–33.5)
MCHC RBC AUTO-ENTMCNC: 32.5 G/DL (ref 25.2–33.5)
MCV RBC AUTO: 84.7 FL (ref 82.6–102.9)
MONOCYTES NFR BLD: 0.62 K/UL (ref 0.1–1.2)
MONOCYTES NFR BLD: 7 % (ref 3–12)
NEUTROPHILS NFR BLD: 80 % (ref 36–65)
NEUTS SEG NFR BLD: 7.58 K/UL (ref 1.5–8.1)
NRBC BLD-RTO: 0 PER 100 WBC
PLATELET # BLD AUTO: 185 K/UL (ref 138–453)
PMV BLD AUTO: 10.9 FL (ref 8.1–13.5)
POTASSIUM SERPL-SCNC: 4.2 MMOL/L (ref 3.7–5.3)
POTASSIUM SERPL-SCNC: 5.5 MMOL/L (ref 3.7–5.3)
RBC # BLD AUTO: 3.13 M/UL (ref 3.95–5.11)
RBC # BLD: ABNORMAL 10*6/UL
SODIUM SERPL-SCNC: 136 MMOL/L (ref 136–145)
WBC OTHER # BLD: 9.3 K/UL (ref 3.5–11.3)

## 2025-06-25 PROCEDURE — 99231 SBSQ HOSP IP/OBS SF/LOW 25: CPT | Performed by: INTERNAL MEDICINE

## 2025-06-25 PROCEDURE — 97116 GAIT TRAINING THERAPY: CPT

## 2025-06-25 PROCEDURE — 6360000002 HC RX W HCPCS

## 2025-06-25 PROCEDURE — 96365 THER/PROPH/DIAG IV INF INIT: CPT

## 2025-06-25 PROCEDURE — 6370000000 HC RX 637 (ALT 250 FOR IP): Performed by: NURSE PRACTITIONER

## 2025-06-25 PROCEDURE — G0378 HOSPITAL OBSERVATION PER HR: HCPCS

## 2025-06-25 PROCEDURE — 85025 COMPLETE CBC W/AUTO DIFF WBC: CPT

## 2025-06-25 PROCEDURE — 6370000000 HC RX 637 (ALT 250 FOR IP): Performed by: INTERNAL MEDICINE

## 2025-06-25 PROCEDURE — 80048 BASIC METABOLIC PNL TOTAL CA: CPT

## 2025-06-25 PROCEDURE — 96375 TX/PRO/DX INJ NEW DRUG ADDON: CPT

## 2025-06-25 PROCEDURE — 36415 COLL VENOUS BLD VENIPUNCTURE: CPT

## 2025-06-25 PROCEDURE — 96361 HYDRATE IV INFUSION ADD-ON: CPT

## 2025-06-25 PROCEDURE — 82947 ASSAY GLUCOSE BLOOD QUANT: CPT

## 2025-06-25 PROCEDURE — 96376 TX/PRO/DX INJ SAME DRUG ADON: CPT

## 2025-06-25 PROCEDURE — 97110 THERAPEUTIC EXERCISES: CPT

## 2025-06-25 PROCEDURE — 6360000002 HC RX W HCPCS: Performed by: NURSE PRACTITIONER

## 2025-06-25 RX ORDER — CALCIUM GLUCONATE 20 MG/ML
1000 INJECTION, SOLUTION INTRAVENOUS ONCE
Status: COMPLETED | OUTPATIENT
Start: 2025-06-25 | End: 2025-06-25

## 2025-06-25 RX ADMIN — CALCIUM GLUCONATE 1000 MG: 20 INJECTION, SOLUTION INTRAVENOUS at 06:36

## 2025-06-25 RX ADMIN — ASPIRIN 325 MG: 325 TABLET ORAL at 08:52

## 2025-06-25 RX ADMIN — DOCUSATE SODIUM 100 MG: 100 CAPSULE, LIQUID FILLED ORAL at 08:52

## 2025-06-25 RX ADMIN — INSULIN LISPRO 2 UNITS: 100 INJECTION, SOLUTION INTRAVENOUS; SUBCUTANEOUS at 08:53

## 2025-06-25 RX ADMIN — LOSARTAN POTASSIUM 50 MG: 50 TABLET, FILM COATED ORAL at 08:52

## 2025-06-25 RX ADMIN — KETOROLAC TROMETHAMINE 15 MG: 30 INJECTION, SOLUTION INTRAMUSCULAR at 02:06

## 2025-06-25 RX ADMIN — PROPRANOLOL HYDROCHLORIDE 120 MG: 60 CAPSULE, EXTENDED RELEASE ORAL at 08:52

## 2025-06-25 RX ADMIN — ESCITALOPRAM OXALATE 30 MG: 10 TABLET ORAL at 08:52

## 2025-06-25 RX ADMIN — LEVOTHYROXINE SODIUM 100 MCG: 0.1 TABLET ORAL at 06:36

## 2025-06-25 RX ADMIN — KETOROLAC TROMETHAMINE 15 MG: 30 INJECTION, SOLUTION INTRAMUSCULAR at 08:53

## 2025-06-25 RX ADMIN — Medication 2000 MG: at 03:42

## 2025-06-25 NOTE — PROGRESS NOTES
Orthopedic Progress Note    Patient:  Sp Jimenez  YOB: 1945     80 y.o. female    Subjective:  Patient seen and examined  No complaints or concerns  No issue overnight  Pain controlled  Denies fever, HA, CP, SOB, N/V, dysuria  Vitals reviewed, afebrile    Objective:   Vitals:    06/25/25 0753   BP: 117/60   Pulse: 88   Resp: 18   Temp: 97.5 °F (36.4 °C)   SpO2: 94%     Gen: alert, oriented, NAD  Cardiovascular: regg rate, no dependent edema, distal pulses 2+  Respiratory: Chest symmetric, no accessory muscle use, normal respirations, no audible wheezes  LLE: Dressings c/d/I. Compartments soft. 2+ DP pulse. TA/EHL/FHL/GS motor intact. Deep and Superficial Peroneal/Saphenous/Sural SILT.    Recent Labs     06/25/25  0535   WBC 9.3   HGB 8.6*   HCT 26.5*         K 4.2   BUN 32*   CREATININE 1.6*   GLUCOSE 203*      Meds:   See rec for complete list    Impression/plan: 80 y.o. female s/p L TKA, POD#1      -WB status WBAT  -Pain control PO/IV Medication. Attempt to Wean IV medications.   -DVT ppx: ASA  -Ice/Elevate  -Encourage Incentive Spirometry use  -PT/OT  -DC to home today      Vijay Niño, DO   Orthopedic Surgery

## 2025-06-25 NOTE — PLAN OF CARE
Problem: Discharge Planning  Goal: Discharge to home or other facility with appropriate resources  Outcome: Progressing  Flowsheets  Taken 6/24/2025 2000 by Lolis Bridges RN  Discharge to home or other facility with appropriate resources:   Identify barriers to discharge with patient and caregiver   Refer to discharge planning if patient needs post-hospital services based on physician order or complex needs related to functional status, cognitive ability or social support system  Taken 6/24/2025 1330 by Krystal Lance RN  Discharge to home or other facility with appropriate resources: Identify barriers to discharge with patient and caregiver  Taken 6/24/2025 1127 by Krystal Lance RN  Discharge to home or other facility with appropriate resources: Identify barriers to discharge with patient and caregiver     Problem: Pain  Goal: Verbalizes/displays adequate comfort level or baseline comfort level  Outcome: Progressing     Problem: Safety - Adult  Goal: Free from fall injury  Outcome: Progressing     Problem: Chronic Conditions and Co-morbidities  Goal: Patient's chronic conditions and co-morbidity symptoms are monitored and maintained or improved  Outcome: Progressing  Flowsheets (Taken 6/24/2025 2000)  Care Plan - Patient's Chronic Conditions and Co-Morbidity Symptoms are Monitored and Maintained or Improved:   Monitor and assess patient's chronic conditions and comorbid symptoms for stability, deterioration, or improvement   Collaborate with multidisciplinary team to address chronic and comorbid conditions and prevent exacerbation or deterioration   Update acute care plan with appropriate goals if chronic or comorbid symptoms are exacerbated and prevent overall improvement and discharge     Problem: ABCDS Injury Assessment  Goal: Absence of physical injury  Outcome: Progressing     Problem: Skin/Tissue Integrity - Adult  Goal: Incisions, wounds, or drain sites healing without S/S of

## 2025-06-25 NOTE — PROGRESS NOTES
Hospitalist Progress Note    Patient:  Sp Jimenez     YOB: 1945    MRN: [id number]   Admit date: 6/24/2025     Acct: [id number]     PCP: Sg Maguire MD    CC--Interval History: POD 1 left TKA---6.24.2025---Yolanda----seen by Orthopedics on and home on 6.25.2025.    Hyperkalemia----corrected---rec'd potassium correction regimen----was on a potassium-sparing diuretic--Maxide----Maxide dc'd and should not be cont'd    See note below     All other ROS negative except noted in HPI    Diet:  ADULT DIET; Regular    Medications:  Scheduled Meds:   calcium gluconate  1,000 mg IntraVENous Once    escitalopram  30 mg Oral Daily    [Held by provider] hydroCHLOROthiazide  12.5 mg Oral QAM    levothyroxine  100 mcg Oral Daily    losartan  50 mg Oral Daily    propranolol  120 mg Oral Daily    ketorolac  15 mg IntraVENous Q6H    docusate sodium  100 mg Oral Daily    aspirin  325 mg Oral Daily    [Held by provider] triamterene-hydroCHLOROthiazide  1 capsule Oral Daily    ipratropium 0.5 mg-albuterol 2.5 mg  1 Dose Inhalation Q4H WA RT    insulin glargine  5 Units SubCUTAneous Nightly    insulin lispro  0-8 Units SubCUTAneous 4x Daily AC & HS     Continuous Infusions:   sodium chloride 100 mL/hr at 06/25/25 0358    dextrose      dextrose       PRN Meds:meclizine, cyclobenzaprine, ondansetron, HYDROcodone 5 mg - acetaminophen **OR** HYDROcodone 5 mg - acetaminophen, acetaminophen, HYDROmorphone, glucose, dextrose bolus **OR** dextrose bolus, glucagon (rDNA), dextrose, ipratropium 0.5 mg-albuterol 2.5 mg, glucose, dextrose bolus **OR** dextrose bolus, dextrose    Objective:  Labs:  CBC with Differential:    Lab Results   Component Value Date/Time    WBC 9.3 06/25/2025 05:35 AM    RBC 3.13 06/25/2025 05:35 AM    HGB 8.6 06/25/2025 05:35 AM    HCT 26.5 06/25/2025 05:35 AM     06/25/2025 05:35 AM    MCV 84.7 06/25/2025 05:35 AM    MCH 27.5 06/25/2025 05:35 AM    MCHC 32.5 06/25/2025 05:35 AM    RDW 15.8 06/25/2025

## 2025-06-25 NOTE — PROGRESS NOTES
Potassium recheck showed increased potassium level after 1 L IVF bolus, new orders placed to check magnesium and start hyperkalemia treatment. Recheck potassium at midnight.

## 2025-06-25 NOTE — DISCHARGE SUMMARY
Orthopaedic Discharge Summary     Patient ID:  Sp GRAY Tony  [id number]  80 y.o.  1945    Admit date: 6/24/2025    Discharge date and time: 6-25-25    Admitting Physician: Williams Guerrero MD     Discharge Physician: Yolanda    Admission Diagnoses: Primary osteoarthritis of left knee [M17.12]  Chronic pain of left knee [M25.562, G89.29]  Localized osteoarthritis of left knee [M17.12]    Discharge Diagnoses: s/p L TKA    Admission Condition: good    Discharged Condition: good    Indication for Admission: The patient is a 80 y.o.-year-old with Left  knee pain for quite sometime. It affects the patient's day-to-day activities. The patient occasionally has to use assist devices to get up from a seated position. Pain is localized along the joint line. The patient has tried activity modification. The patient has had pain medications. The patient has had corticosteroid injections as well.  There has been attempts with physical therapy and have not relieved the symptoms The physical exam elicits  pain along the joint line and crepitus with range of motion. Range of motion is slightly decrease. The x-rays showed significant osteoarthritis. Discussed the option of total knee replacement. We have elected to proceed.     Surgical procedure: L TKA    Consults: IM        Disposition: home    Patient Instructions:   Current Discharge Medication List        START taking these medications    Details   cyclobenzaprine (FLEXERIL) 10 MG tablet Take 1 tablet by mouth 3 times daily as needed for Muscle spasms  Qty: 40 tablet, Refills: 0      HYDROcodone-acetaminophen (NORCO) 5-325 MG per tablet Take 1 tablet by mouth every 4 hours as needed for Pain for up to 7 days. Max Daily Amount: 6 tablets  Qty: 40 tablet, Refills: 0    Comments: Reduce doses taken as pain becomes manageable  Associated Diagnoses: Status post left knee replacement           CONTINUE these medications which have CHANGED    Details   aspirin (VIOLETTA ASPIRIN) 325 MG

## 2025-06-25 NOTE — PLAN OF CARE
Problem: Discharge Planning  Goal: Discharge to home or other facility with appropriate resources  6/25/2025 0847 by Azra Hairston RN  Outcome: Progressing  6/24/2025 2032 by Lolis Bridges RN  Outcome: Progressing  Flowsheets  Taken 6/24/2025 2000 by Lolis Bridges RN  Discharge to home or other facility with appropriate resources:   Identify barriers to discharge with patient and caregiver   Refer to discharge planning if patient needs post-hospital services based on physician order or complex needs related to functional status, cognitive ability or social support system  Taken 6/24/2025 1330 by Krystal Lance RN  Discharge to home or other facility with appropriate resources: Identify barriers to discharge with patient and caregiver  Taken 6/24/2025 1127 by Krystal Lance RN  Discharge to home or other facility with appropriate resources: Identify barriers to discharge with patient and caregiver     Problem: Pain  Goal: Verbalizes/displays adequate comfort level or baseline comfort level  6/25/2025 0847 by Azra Hairston RN  Outcome: Progressing  6/24/2025 2032 by Lolis Bridges RN  Outcome: Progressing     Problem: Safety - Adult  Goal: Free from fall injury  6/25/2025 0847 by Azra Hairston RN  Outcome: Progressing  6/24/2025 2032 by Lolis Bridges RN  Outcome: Progressing     Problem: Chronic Conditions and Co-morbidities  Goal: Patient's chronic conditions and co-morbidity symptoms are monitored and maintained or improved  6/25/2025 0847 by Azra Hairston RN  Outcome: Progressing  6/24/2025 2032 by Lolis Bridges RN  Outcome: Progressing  Flowsheets (Taken 6/24/2025 2000)  Care Plan - Patient's Chronic Conditions and Co-Morbidity Symptoms are Monitored and Maintained or Improved:   Monitor and assess patient's chronic conditions and comorbid symptoms for stability, deterioration, or improvement   Collaborate with multidisciplinary team to address chronic and comorbid

## 2025-06-25 NOTE — PROGRESS NOTES
Physical Therapy  Facility/Department: [facility]  PROGRESSIVE CARE  Daily Treatment Note  NAME: Sp Jimenez  : 1945  MRN: [id number]    Date of Service: 2025    Discharge Recommendations:  Continue to assess pending progress, Outpatient PT        Patient Diagnosis(es): The primary encounter diagnosis was Status post left knee replacement. A diagnosis of Hyperkalemia was also pertinent to this visit.    Assessment  Activity Tolerance: Patient tolerated treatment well    Plan  Physical Therapy Plan  General Plan: 2 times a day 7 days a week  Current Treatment Recommendations: Strengthening;ROM;Balance training;Functional mobility training;Gait training;Stair training;Transfer training;Neuromuscular re-education;Safety education & training;Home exercise program;Return to work related activity    Restrictions  Restrictions/Precautions  Restrictions/Precautions: Weight Bearing  Lower Extremity Weight Bearing Restrictions  Left Lower Extremity Weight Bearing: Weight Bearing As Tolerated     Subjective   Subjective  Subjective: Pt in bed upon arrival. Just went to restroom. Pt pleasant and agreeable to session. Son in room to interpret  Pain: knee pain reported, not numerically rated    Objective  Vitals  O2 Device: None (Room air)  Bed Mobility Training  Bed Mobility Training: Yes  Overall Level of Assistance: Supervision  Supine to Sit: Supervision  Sit to Supine: Supervision  Scooting: Supervision  Balance  Sitting: Intact  Standing: Intact  Transfer Training  Transfer Training: Yes  Overall Level of Assistance: Supervision  Sit to Stand: Supervision  Stand to Sit: Supervision  Gait  Gait Training: Yes  Overall Level of Assistance: Stand by assistance  Distance (ft): 200 Feet  Assistive Device: Walker, rolling  Step Length: Left shortened;Right shortened     PT Exercises  Exercise Treatment: heel slides x10, hip abd x10, SLR x10, ankle pumps x10, quad sets x10     Safety Devices  Type of Devices: Bed alarm in  place;Gait belt;Left in bed;Nurse notified;Call light within reach         Goals  Short Term Goals  Time Frame for Short Term Goals: LOS  Short Term Goal 1: Bed mobilty with mod I  Short Term Goal 2: Transfers with mod  Short Term Goal 3: Amb x 50ft with RW x 1 with mod I  Patient Goals   Patient Goals : Return Home    Education  Patient Education  Education Given To: Patient;Family  Education Provided: Role of Therapy    AM-PAC - Mobility              Therapy Time   Individual Concurrent Group Co-treatment   Time In 0939         Time Out 1002         Minutes 23                 Evette Cota, PTA

## 2025-06-26 ENCOUNTER — TELEPHONE (OUTPATIENT)
Dept: ORTHOPEDIC SURGERY | Age: 80
End: 2025-06-26

## 2025-06-26 ENCOUNTER — CARE COORDINATION (OUTPATIENT)
Dept: CARE COORDINATION | Age: 80
End: 2025-06-26

## 2025-06-26 ENCOUNTER — TELEPHONE (OUTPATIENT)
Dept: INTERNAL MEDICINE | Age: 80
End: 2025-06-26

## 2025-06-26 ENCOUNTER — HOSPITAL ENCOUNTER (OUTPATIENT)
Dept: PHYSICAL THERAPY | Age: 80
Setting detail: THERAPIES SERIES
Discharge: HOME | End: 2025-06-26

## 2025-06-26 ENCOUNTER — FOLLOWUP TELEPHONE ENCOUNTER (OUTPATIENT)
Dept: INPATIENT UNIT | Age: 80
End: 2025-06-26

## 2025-06-26 DIAGNOSIS — Z96.651 STATUS POST TOTAL RIGHT KNEE REPLACEMENT: Primary | ICD-10-CM

## 2025-06-26 DIAGNOSIS — M17.12 LOCALIZED OSTEOARTHRITIS OF LEFT KNEE: Primary | ICD-10-CM

## 2025-06-26 RX ORDER — OXYCODONE AND ACETAMINOPHEN 5; 325 MG/1; MG/1
1 TABLET ORAL EVERY 6 HOURS PRN
Qty: 28 TABLET | Refills: 0 | Status: SHIPPED | OUTPATIENT
Start: 2025-06-26 | End: 2025-07-03

## 2025-06-26 NOTE — CARE COORDINATION
Pharmacist?: No  Do you feel like you have everything you need to keep you well at home?: Yes  Care Transitions Interventions    Pharmacist: Completed      Physical Therapy: Completed      Specialty Service Referral: Declined              Follow Up Appointment:   Discussed follow up appointments. Patient has hospital follow up appointment scheduled within 14 days of discharge.   Future Appointments         Provider Specialty Dept Phone    6/26/2025  1:00 PM (Arrive by 12:45 PM) Alfonso Goldberg, PT Physical Therapy [phone]    7/3/2025 8:00 AM Sajan Verdin DPM Orthopedic Surgery [phone]    7/3/2025 1:45 PM (Arrive by 1:30 PM) Mian Hurt PT Physical Therapy [phone]    7/10/2025 3:20 PM Sg Maguire MD Internal Medicine [phone]    7/15/2025 8:45 AM Williams Guerrero MD Orthopedic Surgery [phone]            Care Transition Nurse provided contact information.  Plan for follow-up call in 6-10 days based on severity of symptoms and risk factors.  Plan for next call: symptom management-follow up on pain, s/s of infection, PT, new medication  follow-up appointment-f/u on HFU appt     Ashley Mercado RN      68

## 2025-06-26 NOTE — TELEPHONE ENCOUNTER
Percocet sent to [facility]. Take instead of norco for now. Take flexeril also. Ice and elevate the knee.

## 2025-06-26 NOTE — TELEPHONE ENCOUNTER
Care Transitions Initial Follow Up Call    Outreach made within 2 business days of discharge: Yes    Patient: Sp Jimenez Patient : 1945   MRN: [id number]  Reason for Admission: localized osteoarthritis of left knee     Discharge Date: 25       Spoke with: no answer, vm left to return call.

## 2025-06-26 NOTE — TELEPHONE ENCOUNTER
Patient had total knee replacement done Tuesday and she is having a lot of pain and discomfort. She is not sleeping and they are wondering what else they can do, if they should take two pain meds or what. Please let them know what should be done.

## 2025-06-26 NOTE — PROGRESS NOTES
Physical Therapy  Outpatient Physical Therapy    [x] [place]  Phone: [phone]  Fax: [phone]      [] [place]  Phone: [phone]  Fax: [phone]    Physical Therapy  Cancellation/No-show Note  Patient Name:  Sp Jimenez  :  1945   Date:  2025  Cancelled visits to date: 1  No-shows to date: 0    For today's appointment patient:  [x]  Cancelled  []  Rescheduled appointment  []  No-show     Reason given by patient:  []  Patient ill  []  Conflicting appointment  []  No transportation    []  Conflict with work  []  No reason given  [x]  Other:     Comments:  \"In too much pain\"    Electronically signed by: Alfonso Goldberg, PT, DPT

## 2025-06-26 NOTE — TELEPHONE ENCOUNTER
Care Transitions Initial Follow Up Call    Outreach made within 2 business days of discharge: Yes    Patient: Sp Jimenez Patient : 1945   MRN: [id number]  Reason for Admission: Left Knee Replacement  Discharge Date: 25       Spoke with: Timmy (son)    Discharge department/facility: [facility]    TCM Interactive Patient Contact:  Was patient able to fill all prescriptions: Yes  Was patient instructed to bring all medications to the follow-up visit: Yes  Is patient taking all medications as directed in the discharge summary? Yes  Does patient understand their discharge instructions: Yes  Does patient have questions or concerns that need addressed prior to 7-14 day follow up office visit: no    Additional needs identified to be addressed with provider  No needs identified             Scheduled appointment with PCP within 7-14 days    Follow Up  Future Appointments   Date Time Provider Department Center   7/3/2025  8:00 AM Sajan Verdin DPM [facility]   7/3/2025  1:45 PM [facility]   7/10/2025  3:20 PM Sg Maguire MD [facility]   7/15/2025  8:45 AM Williams Guerrero MD [facility]       Payton Jeff LPN

## 2025-06-30 ENCOUNTER — TELEPHONE (OUTPATIENT)
Dept: ORTHOPEDIC SURGERY | Age: 80
End: 2025-06-30

## 2025-06-30 NOTE — TELEPHONE ENCOUNTER
Patient son Timmy called in and stated Sp knee is still swollen, red and warm, No drainage per son. Per son Sp is icing elevating and taking her meds and she is having difficulty sleeping and wants to know what else she can do or if she can be seen sooner than 7/15/25.     Do you want to see patient sooner?

## 2025-07-03 ENCOUNTER — HOSPITAL ENCOUNTER (OUTPATIENT)
Dept: PHYSICAL THERAPY | Age: 80
Setting detail: THERAPIES SERIES
Discharge: HOME | End: 2025-07-03

## 2025-07-03 ENCOUNTER — OFFICE VISIT (OUTPATIENT)
Dept: ORTHOPEDIC SURGERY | Age: 80
End: 2025-07-03
Payer: MEDICAID

## 2025-07-03 VITALS
SYSTOLIC BLOOD PRESSURE: 116 MMHG | BODY MASS INDEX: 31.18 KG/M2 | WEIGHT: 176 LBS | RESPIRATION RATE: 16 BRPM | HEIGHT: 63 IN | DIASTOLIC BLOOD PRESSURE: 70 MMHG

## 2025-07-03 DIAGNOSIS — M65.321 TRIGGER INDEX FINGER OF RIGHT HAND: ICD-10-CM

## 2025-07-03 DIAGNOSIS — Z96.652 STATUS POST TOTAL KNEE REPLACEMENT USING CEMENT, LEFT: Primary | ICD-10-CM

## 2025-07-03 PROCEDURE — 20550 NJX 1 TENDON SHEATH/LIGAMENT: CPT | Performed by: PHYSICIAN ASSISTANT

## 2025-07-03 PROCEDURE — 99213 OFFICE O/P EST LOW 20 MIN: CPT | Performed by: PHYSICIAN ASSISTANT

## 2025-07-03 RX ORDER — BUPIVACAINE HYDROCHLORIDE 5 MG/ML
0.5 INJECTION, SOLUTION PERINEURAL ONCE
Status: COMPLETED | OUTPATIENT
Start: 2025-07-03 | End: 2025-07-03

## 2025-07-03 RX ORDER — TRIAMCINOLONE ACETONIDE 40 MG/ML
20 INJECTION, SUSPENSION INTRA-ARTICULAR; INTRAMUSCULAR ONCE
Status: COMPLETED | OUTPATIENT
Start: 2025-07-03 | End: 2025-07-03

## 2025-07-03 RX ORDER — OMEPRAZOLE 20 MG/1
20 CAPSULE, DELAYED RELEASE ORAL DAILY
Qty: 90 CAPSULE | Refills: 1 | Status: SHIPPED | OUTPATIENT
Start: 2025-07-03

## 2025-07-03 RX ADMIN — BUPIVACAINE HYDROCHLORIDE 2.5 MG: 5 INJECTION, SOLUTION PERINEURAL at 09:33

## 2025-07-03 RX ADMIN — TRIAMCINOLONE ACETONIDE 20 MG: 200 INJECTION, SUSPENSION INTRA-ARTICULAR; INTRAMUSCULAR at 09:33

## 2025-07-03 NOTE — PROGRESS NOTES
Physical Therapy  Outpatient Physical Therapy    [x] [place]  Phone: [phone]  Fax: [phone]      [] [place]  Phone: [phone]  Fax: [phone]    Physical Therapy  Cancellation/No-show Note  Patient Name:  Sp Jimenez  :  1945   Date:  7/3/2025  Cancelled visits to date: 1  No-shows to date: 0    For today's appointment patient:  [x]  Cancelled  []  Rescheduled appointment  []  No-show     Reason given by patient:  []  Patient ill  []  Conflicting appointment  []  No transportation    []  Conflict with work  []  No reason given  [x]  Other:     Comments:  Instructed to Hold by Ortho     Electronically signed by: Mian Hurt, PT

## 2025-07-03 NOTE — PROGRESS NOTES
Value Date/Time     06/25/2025 05:35 AM    K 4.2 06/25/2025 05:35 AM     06/25/2025 05:35 AM    CO2 22 06/25/2025 05:35 AM    BUN 32 06/25/2025 05:35 AM    CREATININE 1.6 06/25/2025 05:35 AM    CALCIUM 8.2 06/25/2025 05:35 AM    GLUCOSE 203 06/25/2025 05:35 AM     PT/INR:    Lab Results   Component Value Date/Time    PROTIME 9.6 11/23/2015 11:18 AM    INR 0.9 11/23/2015 11:18 AM     Troponin:    Lab Results   Component Value Date/Time    TROPONINI 0.00 11/23/2015 12:39 PM     No results for input(s): \"LIPASE\", \"AMYLASE\" in the last 72 hours.  No results for input(s): \"AST\", \"ALT\", \"BILIDIR\", \"BILITOT\", \"ALKPHOS\" in the last 72 hours.  Uric Acid:  No components found for: \"URIC\"  Urine Culture:  No components found for: \"CURINE\"    Radiology:   XR KNEE LEFT (1-2 VIEWS)  Result Date: 6/24/2025  EXAMINATION: TWO XRAY VIEWS OF THE LEFT KNEE; ONE XRAY VIEW OF THE CHEST 6/24/2025 11:34 am; 6/24/2025 1:00 pm COMPARISON: 05/13/2025 and prior HISTORY: ORDERING SYSTEM PROVIDED HISTORY: arthroplasty TECHNOLOGIST PROVIDED HISTORY: arthroplasty Reason for Exam: post op left knee; ORDERING SYSTEM PROVIDED HISTORY: post operative TECHNOLOGIST PROVIDED HISTORY: post operative FINDINGS: Left knee: Status post total knee arthroplasty.  No evidence of a periprosthetic fracture or hardware loosening.  Superficial skin staples with diffuse soft tissue swelling and subcutaneous emphysema overlying the knee consistent with recent surgery. Chest: Vascular congestion.  Low lung volumes with bronchovascular crowding and bibasilar opacities. Normal cardiomediastinal silhouette. No pleural effusion or pneumothorax. No acute osseous abnormality.     Status post total knee arthroplasty without evidence of hardware complication. IMPRESSION: Status post left total knee arthroplasty without evidence of hardware complication. Vascular congestion. Low lung volumes with bibasilar opacities that likely represents atelectasis.     XR CHEST

## 2025-07-07 DIAGNOSIS — M79.671 RIGHT FOOT PAIN: Primary | ICD-10-CM

## 2025-07-10 ENCOUNTER — OFFICE VISIT (OUTPATIENT)
Dept: ORTHOPEDIC SURGERY | Age: 80
End: 2025-07-10
Payer: MEDICAID

## 2025-07-10 ENCOUNTER — CARE COORDINATION (OUTPATIENT)
Dept: CARE COORDINATION | Age: 80
End: 2025-07-10

## 2025-07-10 ENCOUNTER — OFFICE VISIT (OUTPATIENT)
Dept: INTERNAL MEDICINE | Age: 80
End: 2025-07-10
Payer: MEDICAID

## 2025-07-10 VITALS
DIASTOLIC BLOOD PRESSURE: 70 MMHG | SYSTOLIC BLOOD PRESSURE: 130 MMHG | HEART RATE: 96 BPM | WEIGHT: 176 LBS | OXYGEN SATURATION: 99 % | BODY MASS INDEX: 31.18 KG/M2 | HEIGHT: 63 IN

## 2025-07-10 VITALS
OXYGEN SATURATION: 100 % | HEIGHT: 63 IN | DIASTOLIC BLOOD PRESSURE: 72 MMHG | SYSTOLIC BLOOD PRESSURE: 130 MMHG | WEIGHT: 163 LBS | RESPIRATION RATE: 16 BRPM | HEART RATE: 53 BPM | BODY MASS INDEX: 28.88 KG/M2

## 2025-07-10 DIAGNOSIS — E03.9 HYPOTHYROIDISM, UNSPECIFIED TYPE: Primary | ICD-10-CM

## 2025-07-10 DIAGNOSIS — I10 HYPERTENSION, UNSPECIFIED TYPE: ICD-10-CM

## 2025-07-10 DIAGNOSIS — R73.03 PREDIABETES: ICD-10-CM

## 2025-07-10 DIAGNOSIS — I25.10 CORONARY ARTERY DISEASE WITHOUT ANGINA PECTORIS, UNSPECIFIED VESSEL OR LESION TYPE, UNSPECIFIED WHETHER NATIVE OR TRANSPLANTED HEART: ICD-10-CM

## 2025-07-10 DIAGNOSIS — Z96.652 STATUS POST TOTAL KNEE REPLACEMENT USING CEMENT, LEFT: Primary | ICD-10-CM

## 2025-07-10 PROCEDURE — 3075F SYST BP GE 130 - 139MM HG: CPT | Performed by: INTERNAL MEDICINE

## 2025-07-10 PROCEDURE — 3078F DIAST BP <80 MM HG: CPT | Performed by: INTERNAL MEDICINE

## 2025-07-10 PROCEDURE — G2211 COMPLEX E/M VISIT ADD ON: HCPCS | Performed by: INTERNAL MEDICINE

## 2025-07-10 PROCEDURE — 99214 OFFICE O/P EST MOD 30 MIN: CPT | Performed by: NURSE PRACTITIONER

## 2025-07-10 PROCEDURE — 1036F TOBACCO NON-USER: CPT | Performed by: INTERNAL MEDICINE

## 2025-07-10 PROCEDURE — 99214 OFFICE O/P EST MOD 30 MIN: CPT | Performed by: INTERNAL MEDICINE

## 2025-07-10 PROCEDURE — 1090F PRES/ABSN URINE INCON ASSESS: CPT | Performed by: INTERNAL MEDICINE

## 2025-07-10 PROCEDURE — 99024 POSTOP FOLLOW-UP VISIT: CPT | Performed by: NURSE PRACTITIONER

## 2025-07-10 PROCEDURE — G8417 CALC BMI ABV UP PARAM F/U: HCPCS | Performed by: INTERNAL MEDICINE

## 2025-07-10 PROCEDURE — G8399 PT W/DXA RESULTS DOCUMENT: HCPCS | Performed by: INTERNAL MEDICINE

## 2025-07-10 PROCEDURE — 1123F ACP DISCUSS/DSCN MKR DOCD: CPT | Performed by: INTERNAL MEDICINE

## 2025-07-10 PROCEDURE — G8427 DOCREV CUR MEDS BY ELIG CLIN: HCPCS | Performed by: INTERNAL MEDICINE

## 2025-07-10 RX ORDER — OXYCODONE AND ACETAMINOPHEN 5; 325 MG/1; MG/1
1 TABLET ORAL EVERY 6 HOURS PRN
Qty: 28 TABLET | Refills: 0 | Status: SHIPPED | OUTPATIENT
Start: 2025-07-10 | End: 2025-07-17

## 2025-07-10 RX ORDER — CYCLOBENZAPRINE HCL 10 MG
10 TABLET ORAL 3 TIMES DAILY PRN
Qty: 40 TABLET | Refills: 0 | Status: SHIPPED | OUTPATIENT
Start: 2025-07-10 | End: 2025-07-23

## 2025-07-10 NOTE — PROGRESS NOTES
Orthopaedic Progress Note      CHIEF COMPLAINT: Follow-up left total knee replacement    HISTORY OF PRESENT ILLNESS:       Ms. Jimenez  is a 80 y.o. female  who presents with a follow-up of a left total knee replacement.  Patient did have surgery on June 24, 2025.  She was also seen last week for pain and swelling to the left knee.  She also had a trigger finger injection.  Patient does have a healed surgical wound.  Staples were removed.  There is no redness or drainage.  Patient is working with physical therapy good range of motion to the left knee noted.      Past Medical History:    Past Medical History:   Diagnosis Date    Chronic kidney disease     family denies    Diabetes mellitus (HCC)     borderline diabetic    HTN (hypertension)     Osteoporosis        Past Surgical History:    Past Surgical History:   Procedure Laterality Date    COLONOSCOPY N/A 09/20/2023    with polypectomy and snare/cold biopsy, x 3 small adenomatous polyps, x 1 lipoma, sigmoid diverticulois, hemorrhoids - performed by Manny Arana MD at [facility] OR    EXTERNAL EAR SURGERY      20 years ago    TOTAL KNEE ARTHROPLASTY Right 11/19/2024    Right total knee arthroplasty performed by Williams Guerrero MD at [facility] OR    TOTAL KNEE ARTHROPLASTY Left 6/24/2025    Left Total knee arthroplasty performed by Williams Guerrero MD at [facility] OR    UPPER GASTROINTESTINAL ENDOSCOPY N/A 09/20/2023    EGD Biopsy mild chronic gastritis, small hiatal hernia - performed by Manny Arana MD at [facility] OR         Current  Medications:  Current Outpatient Medications   Medication Sig Dispense Refill    cyclobenzaprine (FLEXERIL) 10 MG tablet Take 1 tablet by mouth 3 times daily as needed for Muscle spasms 40 tablet 0    oxyCODONE-acetaminophen (PERCOCET) 5-325 MG per tablet Take 1 tablet by mouth every 6 hours as needed for Pain for up to 7 days. Max Daily Amount: 4 tablets 28 tablet 0    omeprazole (PRILOSEC) 20 MG delayed release capsule Take 1

## 2025-07-10 NOTE — CARE COORDINATION
primary care provider and/or specialist for any further questions, concerns, or needs.    Paz Juarez LPN

## 2025-07-10 NOTE — PROGRESS NOTES
[facility] INTERNAL MEDICINE    Visit Date:  7/10/2025  Patient:  Sp Jimenez  YOB: 1945    Assessment & Plan    Knee osteoarthritis: Status post bilateral knee replacements.  Recently underwent left TKA.  Will continue to monitor.    Tremor: Continue propranolol.  Reassess next visit.    Neuropathy: Continue Lyrica.  Reassess next visit.    Allergic rhinitis: Continue Flonase neuropathy: Will prescribe Lyrica.  Reassess next visit.    Anxiety: Continue Lexapro.  Reassess next visit.    Hypertension: Blood pressure controlled.  Continue losartan and hydrochlorothiazide.    Hypothyroidism: Continue levothyroxine.  Will continue to monitor.     Diagnosis Orders   1. Hypothyroidism, unspecified type  TSH reflex to FT4      2. Hypertension, unspecified type  CBC with Auto Differential    Comprehensive Metabolic Panel      3. Prediabetes  Hemoglobin A1C      4. Coronary artery disease without angina pectoris, unspecified vessel or lesion type, unspecified whether native or transplanted heart  Lipid Panel          Chief Complaint  Follow-Up from Hospital (Left knee surgery post op )    History of Present Illness   Presents to follow-up after left knee replacement.  Says that she still is sore, says that she uses Percocet and that does not help her with her pain, but says that she can manage at this time..  Denies fever, chills.  Wound does not look infected.  She was evaluated by orthopedics who said that she is feeling fine.  She is set to undergo physical therapy.    Objective  /72 (BP Site: Left Upper Arm, Patient Position: Sitting, BP Cuff Size: Medium Adult)   Pulse 53   Resp 16   Ht 1.6 m (5' 3\")   Wt 73.9 kg (163 lb)   SpO2 100%   BMI 28.87 kg/m²   Constitutional: No acute distress.  Sits in chair comfortably  Eyes: Sclerae nonicteric. No lid lag or proptosis  HENT: External ears normal. No external lesions on the nose  Neck: No gross masses. Trachea visibly

## 2025-07-17 ENCOUNTER — OFFICE VISIT (OUTPATIENT)
Dept: ORTHOPEDIC SURGERY | Age: 80
End: 2025-07-17
Payer: MEDICAID

## 2025-07-17 ENCOUNTER — HOSPITAL ENCOUNTER (OUTPATIENT)
Dept: PHYSICAL THERAPY | Age: 80
Setting detail: THERAPIES SERIES
Discharge: HOME | End: 2025-07-17
Payer: MEDICAID

## 2025-07-17 ENCOUNTER — HOSPITAL ENCOUNTER (OUTPATIENT)
Dept: GENERAL RADIOLOGY | Age: 80
Discharge: HOME | End: 2025-07-19
Payer: MEDICAID

## 2025-07-17 VITALS
HEART RATE: 79 BPM | BODY MASS INDEX: 31.18 KG/M2 | WEIGHT: 176 LBS | SYSTOLIC BLOOD PRESSURE: 117 MMHG | HEIGHT: 63 IN | DIASTOLIC BLOOD PRESSURE: 66 MMHG

## 2025-07-17 DIAGNOSIS — M79.671 RIGHT FOOT PAIN: ICD-10-CM

## 2025-07-17 DIAGNOSIS — M79.671 RIGHT FOOT PAIN: Primary | ICD-10-CM

## 2025-07-17 PROCEDURE — 97161 PT EVAL LOW COMPLEX 20 MIN: CPT | Performed by: PHYSICAL THERAPIST

## 2025-07-17 PROCEDURE — 99214 OFFICE O/P EST MOD 30 MIN: CPT | Performed by: PODIATRIST

## 2025-07-17 PROCEDURE — 97110 THERAPEUTIC EXERCISES: CPT | Performed by: PHYSICAL THERAPIST

## 2025-07-17 PROCEDURE — 73630 X-RAY EXAM OF FOOT: CPT

## 2025-07-17 PROCEDURE — 99024 POSTOP FOLLOW-UP VISIT: CPT | Performed by: PODIATRIST

## 2025-07-17 ASSESSMENT — KOOS JR
KOOS JR TOTAL INTERVAL SCORE: 63.776
HOW SEVERE IS YOUR KNEE STIFFNESS AFTER FIRST WAKING IN MORNING: MODERATE
GOING UP OR DOWN STAIRS: MODERATE
RISING FROM SITTING: MILD
TWISING OR PIVOTING ON KNEE: MODERATE
BENDING TO THE FLOOR TO PICK UP OBJECT: MILD
STRAIGHTENING KNEE FULLY: MILD

## 2025-07-17 ASSESSMENT — PAIN SCALES - GENERAL: PAINLEVEL_OUTOF10: 4

## 2025-07-17 NOTE — PLAN OF CARE
[facility]  Rehabilitation and Sports Medicine    [x] [place]  Phone: [phone]  Fax: [phone]      [] [place]  Phone: [phone]  Fax: [phone]        To:   Melissa Rodriguez APRN - CNP     Patient: Sp Jimenez  : 1945   MRN: [id number]  Evaluation Date: 2025      Diagnosis Information:   Status post left knee replacement [Z96.652]           Physical Therapy Certification  Dear Melissa Rodriguez APRN - CNP  The following patient has been evaluated for physical therapy services and for therapy to continue, Medicare requires monthly physician review of the treatment plan. Please review the attached evaluation and/or summary of the patient's plan of care, and verify that you agree therapy should continue by signing the attached document and sending it back to our office.    Plan of Care/Treatment to date:  [x] Therapeutic Exercise    [x] Modalities:  [x] Therapeutic Activity     [] Ultrasound  [] Electrical Stimulation  [x] Gait Training      [] Cervical Traction [] Lumbar Traction  [x] Neuromuscular Re-education    [] Cold/hotpack [] Iontophoresis   [x] Instruction in HEP     Other:  [x] Manual Therapy      []             [] Aquatic Therapy      []                 Goals:  Short Term Goals  Time Frame for Short Term Goals: 3 weeks  Short Term Goal 1: Initiate HEP    Long Term Goals  Time Frame for Long Term Goals : 6 weeks  Long Term Goal 1: Independent in HEP  Long Term Goal 2: Improve AROM to 0-120 to improve stairs.  Long Term Goal 3: Patient to be able to ascend/descend stairs with recipricol gait pattern and 1 HHA.  Long Term Goal 4: Improve LE strength to 5/5.  Long Term Goal 5: Imrpove KOOS to 75% or greater to improve tolerance to ADls    Frequency/Duration:25-25  # Days per week: [] 1 day # Weeks: [] 1 week [] 5 weeks     [x] 2 days   [] 2 weeks [x] 6 weeks     [x] 3 days   [] 3 weeks [] 7 weeks     [] 4 days   [] 4 weeks [] 8 weeks    Rehab

## 2025-07-17 NOTE — FLOWSHEET NOTE
Physical Therapy Daily Treatment Note    Date:  2025    Patient Name:  Sp Jimenez    :  1945  MRN: [id number]  Restrictions/Precautions:     Medical/Treatment Diagnosis Information:    Status post left knee replacement [Z96.652]    Insurance/Certification information:   [facility]   Physician Information:   Melissa Rodriguez, APRN - CNP   Plan of care signed (Y/N):  n  Visit# / total visits:  1/10  Pain level: 4/10       Time In: 743  Time Out:820    Progress Note: [x]  Yes  []  No  Next due by: Visit #10  Or by    Subjective:   see eval    Objective: see eval  Observation:   Test measurements:      Exercises: Alex, son, present to interrupt   Exercise/Equipment Resistance/Repetitions Other comments   NuStep   5'   L1         Calf Stretch      Hamstring Stretch           Quad Set   X 5     SLR   10x    SAQ     Heel Slides   X 5     supine hip abduction      Supine heel prop               Counter ex     Tband TKE      Squats      Step ups  6\" x 10   Fwd    Seated hip abd/add     Seated HS curls     Seated marches     LAQ     STS       [] Provided verbal/tactile cueing for activities related to strengthening, flexibility, endurance, ROM. (06161)  [] Provided verbal/tactile cueing for activities related to improving balance, coordination, kinesthetic sense, posture, motor skill, proprioception. (92972)    Therapeutic Activities:     [] Therapeutic activities, direct (one-on-one) patient contact (use of dynamic activities to improve functional performance). (71923)    Gait:   [] Provided training and instruction to the patient for ambulation re-education. (76962)    Self-Care/ADL's  [] Self-care/home management training and compensatory training, meal preparation, safety procedures, and instructions in use of assistive technology devices/adaptive equipment, direct one-on-one contact. (61216)    Home Exercise Program:     [] Reviewed/Progressed HEP activities related to strengthening,

## 2025-07-17 NOTE — PROGRESS NOTES
[facility]         *** and Procedure Note      Sp Jimenez  MEDICAL RECORD NUMBER:  [id number]  AGE: 80 y.o.   GENDER: female  : 1945  EPISODE DATE:  2025    Subjective:     Chief Complaint   Patient presents with    Foot Pain     Right foot pain         HISTORY of PRESENT ILLNESS HPI     Sp Jimenez is a 80 y.o. female         PAST MEDICAL HISTORY        Diagnosis Date    Chronic kidney disease     family denies    Diabetes mellitus (HCC)     borderline diabetic    HTN (hypertension)     Osteoporosis        PAST SURGICAL HISTORY    Past Surgical History:   Procedure Laterality Date    COLONOSCOPY N/A 2023    with polypectomy and snare/cold biopsy, x 3 small adenomatous polyps, x 1 lipoma, sigmoid diverticulois, hemorrhoids - performed by Manny Arana MD at [facility] OR    EXTERNAL EAR SURGERY      20 years ago    TOTAL KNEE ARTHROPLASTY Right 2024    Right total knee arthroplasty performed by Williams Guerrero MD at [facility] OR    TOTAL KNEE ARTHROPLASTY Left 2025    Left Total knee arthroplasty performed by Williams Guerrero MD at [facility] OR    UPPER GASTROINTESTINAL ENDOSCOPY N/A 2023    EGD Biopsy mild chronic gastritis, small hiatal hernia - performed by Manny Arana MD at [facility] OR       FAMILY HISTORY    Family History   Problem Relation Age of Onset    Cataracts Neg Hx     Diabetes Neg Hx     Glaucoma Neg Hx        SOCIAL HISTORY    Social History     Tobacco Use    Smoking status: Never    Smokeless tobacco: Never   Vaping Use    Vaping status: Never Used   Substance Use Topics    Alcohol use: Not Currently    Drug use: No       ALLERGIES    No Known Allergies    MEDICATIONS    Current Outpatient Medications on File Prior to Visit   Medication Sig Dispense Refill    cyclobenzaprine (FLEXERIL) 10 MG tablet Take 1 tablet by mouth 3 times daily as needed for Muscle spasms 40 tablet 0    oxyCODONE-acetaminophen (PERCOCET) 5-325 MG per tablet Take 1 tablet

## 2025-07-17 NOTE — PROGRESS NOTES
Physical Therapy  Initial Assessment  Date: 2025  Patient Name: Sp Jimenez  MRN: [id number]  : 1945    Referring Physician: Melissa Rodriguez, *     PCP: gS Maguire MD     Medical Diagnosis: Status post left knee replacement [Z96.652]    Treatment Diagnosis: right knee replacement      Insurance: Payor: [employer] [facility] PL / Plan: [employer] [place] / Product Type: *No Product type* /   Insurance ID: [id number] - (Medicaid Managed)    Subjective:  General  Chart Reviewed: Yes  Patient Assessed for Rehabilitation Services: Yes  Subjective  Subjective: 80 y.o. female  who presents with a follow-up of a left total knee replacement.  Patient did have surgery on 2025.  Prior diagnostic testing:: X-ray  Pain Screening  Patient Currently in Pain: Yes  Pain Level: 4     Orientation:  Orientation  Overall Orientation Status: Within Functional Limits    Social History:  Social History  Lives With: Family  Type of Home: House  Home Layout: Two level;Able to Live on Main level with bedroom/bathroom  Home Access: Stairs to enter with rails  Entrance Stairs - Rails: Both  Entrance Stairs - Number of Steps: 3  Bathroom Shower/Tub: Walk-in shower  Bathroom Toilet: Standard  Bathroom Equipment: Grab bars around toilet  Home Equipment: Walker - 4-Wheeled    Functional Status:  Functional Status  Receives Help From: Family  Prior Level of Assist for ADLs: Independent  Prior Level of Assist for Homemaking: Independent  Homemaking Responsibilities: Yes  Ambulation Assistance: Independent  Prior Level of Assist for Transfers: Independent  Active : No  Patient's  Info: son    Objective:          AROM RLE (degrees)  RLE AROM: WFL  RLE General AROM: knee 0-120  AROM LLE (degrees)  LLE General AROM: 2-115    Strength RLE  Strength RLE: Exception  R Hip Flexion: 5/5  R Hip ABduction: 5/5  R Hip ADduction: 5/5  R Knee Flexion: 5/5  R Knee Extension: 5/5  Strength LLE  Strength

## 2025-07-18 ENCOUNTER — HOSPITAL ENCOUNTER (OUTPATIENT)
Dept: PHYSICAL THERAPY | Age: 80
Setting detail: THERAPIES SERIES
Discharge: HOME | End: 2025-07-18
Payer: MEDICAID

## 2025-07-18 PROCEDURE — 97110 THERAPEUTIC EXERCISES: CPT | Performed by: PHYSICAL THERAPIST

## 2025-07-18 NOTE — FLOWSHEET NOTE
ambulation re-education. (18786)    Self-Care/ADL's  [] Self-care/home management training and compensatory training, meal preparation, safety procedures, and instructions in use of assistive technology devices/adaptive equipment, direct one-on-one contact. (34438)    Home Exercise Program:     [] Reviewed/Progressed HEP activities related to strengthening, flexibility, endurance, ROM. (03144)  [] Reviewed/Progressed HEP activities related to improving balance, coordination, kinesthetic sense, posture, motor skill, proprioception.  (25156)    Manual Treatments:    [] Provided manual therapy to mobilize soft tissue/joints for the purpose of modulating pain, promoting relaxation,  increasing ROM, reducing/eliminating soft tissue swelling/inflammation/restriction, improving soft tissue extensibility. (35765)    Service Based Modalities:      Timed Code Treatment Minutes:   34 ex     Total Treatment Minutes:   34    Treatment/Activity Tolerance:  [] Patient tolerated treatment well [] Patient limited by fatique  [] Patient limited by pain  [] Patient limited by other medical complications  [] Other:     Prognosis: [] Good [] Fair  [] Poor    Patient Requires Follow-up: [x] Yes  [] No    Goals:  Short Term Goals  Time Frame for Short Term Goals: 3 weeks  Short Term Goal 1: Initiate HEP    Long Term Goals  Time Frame for Long Term Goals : 6 weeks  Long Term Goal 1: Independent in HEP  Long Term Goal 2: Improve AROM to 0-120 to improve stairs.  Long Term Goal 3: Patient to be able to ascend/descend stairs with recipricol gait pattern and 1 HHA.  Long Term Goal 4: Improve LE strength to 5/5.  Long Term Goal 5: Imrpove KOOS to 75% or greater to improve tolerance to ADls    Plan:   [x] Continue per plan of care [] Alter current plan (see comments)  [] Plan of care initiated [] Hold pending MD visit [] Discharge    Plan for Next Session:      Electronically signed by:  Mian Hurt PT

## 2025-07-22 ENCOUNTER — APPOINTMENT (OUTPATIENT)
Dept: PHYSICAL THERAPY | Age: 80
End: 2025-07-22
Payer: MEDICAID

## 2025-07-23 ENCOUNTER — HOSPITAL ENCOUNTER (OUTPATIENT)
Dept: PHYSICAL THERAPY | Age: 80
Setting detail: THERAPIES SERIES
Discharge: HOME | End: 2025-07-23
Payer: MEDICAID

## 2025-07-23 PROCEDURE — 97110 THERAPEUTIC EXERCISES: CPT

## 2025-07-23 NOTE — FLOWSHEET NOTE
initiated [] Hold pending MD visit [] Discharge    Plan for Next Session:      Electronically signed by:  EZEKIEL BLISS PTA

## 2025-07-25 ENCOUNTER — HOSPITAL ENCOUNTER (OUTPATIENT)
Dept: PHYSICAL THERAPY | Age: 80
Setting detail: THERAPIES SERIES
Discharge: HOME | End: 2025-07-25
Payer: MEDICAID

## 2025-07-25 PROCEDURE — 97110 THERAPEUTIC EXERCISES: CPT

## 2025-07-25 NOTE — FLOWSHEET NOTE
Physical Therapy Daily Treatment Note    Date:  2025    Patient Name:  Sp Jimenez    :  1945  MRN: [id number]  Restrictions/Precautions:     Medical/Treatment Diagnosis Information:    Status post left knee replacement [Z96.652]    Insurance/Certification information:   [facility]   Physician Information:   Melissa Rodriguez, APRN - CNP   Plan of care signed (Y/N):  n  Visit# / total visits:  4/10  Pain level: 2/10       Time In:     8:59  Time Out:     9:23  Progress Note: []  Yes  [x]  No  Next due by: Visit #10  Or by    Subjective:   Patient to clinic with son Alex. States she did well after last session. Per Alex, pt would like to discharge from PT after next week.     Objective: GOLDIE per flowsheet to improve LE strength and AROM.  Patient requiring verbal and visual cues to complete exs.  Improved AROM this date with slight overpressure for knee ext. Advanced exercises for improved strength and mobility.   Observation:   Test measurements:  1-2- AROM    Exercises: Alex, son, present to interpret   Exercise/Equipment Resistance/Repetitions Other comments   NuStep   5'   L1    Sidestepping 2x at bar Initiated              Supine knee flexion   With band x 5    Quad Set   X 15 ADV   SLR   X 15 ADV   SAQ  X 15 ADV   Heel Slides   X 15 ADV   supine hip abduction  X 15 ADV   Supine heel prop 2'     Prone knee flexion   10x         Counter ex  15x ea     Tband TKE  15x red     Squats   12x ea   Wide / narrow  ADV   Step ups  4\" stairs x 2 with bilateral HR   Fwd    Seated hip abd/add     Seated HS curls     Seated marches     LAQ 15x     STS  10x no UE's       [x] Provided verbal/tactile cueing for activities related to strengthening, flexibility, endurance, ROM. (71524)  [] Provided verbal/tactile cueing for activities related to improving balance, coordination, kinesthetic sense, posture, motor skill, proprioception. (95536)    Therapeutic Activities:     [] Therapeutic

## 2025-07-29 ENCOUNTER — HOSPITAL ENCOUNTER (OUTPATIENT)
Dept: PHYSICAL THERAPY | Age: 80
Setting detail: THERAPIES SERIES
Discharge: HOME | End: 2025-07-29
Payer: MEDICAID

## 2025-07-29 PROCEDURE — 97110 THERAPEUTIC EXERCISES: CPT | Performed by: PHYSICAL THERAPY ASSISTANT

## 2025-07-29 ASSESSMENT — KOOS JR
KOOS JR TOTAL INTERVAL SCORE: 76.332
GOING UP OR DOWN STAIRS: MILD
HOW SEVERE IS YOUR KNEE STIFFNESS AFTER FIRST WAKING IN MORNING: MILD
TWISING OR PIVOTING ON KNEE: MODERATE

## 2025-07-29 NOTE — FLOWSHEET NOTE
Physical Therapy Daily Treatment Note    Date:  2025    Patient Name:  Sp Jimenez    :  1945  MRN: [id number]  Restrictions/Precautions:     Medical/Treatment Diagnosis Information:    Status post left knee replacement [Z96.652]    Insurance/Certification information:   [facility]   Physician Information:   Melissa Rodriguez, APRN - CNP   Plan of care signed (Y/N):  Y  Visit# / total visits:  4/10  Pain level: 2/10       Time In:     104  Time Out: 135    Progress Note: []  Yes  [x]  No  Next due by: Visit #10  Or by    Subjective:   Patient to clinic with son Alex. States she did well after last session. Per Alex, pain at initiation was \"a little bit.\" \"Lots of stretching and bending at home\" Per son, patient is functioning as normal.     Objective: GOLDIE per flowsheet to improve LE strength and AROM.  Patient requiring verbal and visual cues to complete exs.  Improved AROM this date with slight overpressure for knee ext. Advanced exercises for improved strength and mobility.     Observation: Discussed with son that there is improvements to be made in regards to strength and ROM. Ideally therapy would continue for several more visits to maximize strength and motion. Son voices understanding but insists that patient is doing well at home and will continue to make gains with HEP. Still wishes to DC at this time.     Test measurements:   AROM 0-115    Strength knee flexion: 4-4+/5    Ext: 5/5    Hip flex: 4-4+/5    Exercises: Alex, son, present to interpret   Exercise/Equipment Resistance/Repetitions Other comments   NuStep   5'   L1    Sidestepping 2x at bar Initiated              Supine knee flexion   With band x 5    Quad Set   X 15 ADV   SLR   ADV   SAQ  ADV   Heel Slides   X 15 ADV   supine hip abduction  X 15 ADV   Supine heel prop 2'     Prone knee flexion            Counter ex  15x ea     Tband TKE      Squats   15x ea   Wide / narrow  ADV   Step ups  6''x4 (2 sets)  Fwd

## 2025-07-31 ENCOUNTER — HOSPITAL ENCOUNTER (OUTPATIENT)
Dept: PHYSICAL THERAPY | Age: 80
Setting detail: THERAPIES SERIES
End: 2025-07-31
Payer: MEDICAID

## 2025-07-31 NOTE — PROGRESS NOTES
Physical Therapy  Outpatient Physical Therapy    [x] [place]  Phone: [phone]  Fax: [phone]      [] [place]  Phone: [phone]  Fax: [phone]    Physical Therapy  Cancellation/No-show Note  Patient Name:  Sp Jimenez  :  1945   Date:  2025  Cancelled visits to date: 2  No-shows to date: 0    For today's appointment patient:  [x]  Cancelled  []  Rescheduled appointment  []  No-show     Reason given by patient:  []  Patient ill  []  Conflicting appointment  []  No transportation    []  Conflict with work  []  No reason given  [x]  Other:     Comments: Pt D/C last session. Forgot to cancel today's appt in error. All upcoming appt's are cancelled.     Electronically signed by: EZEKIEL BLISS PTA

## 2025-08-01 NOTE — DISCHARGE SUMMARY
measurements:   AROM 0-115     Strength knee flexion: 4-4+/5                          Ext: 5/5                          Hip flex: 4-4+/5       Assessment:   Patient has progressed well with strength and AROM.  Per son (interpretor) patient would like to be Dc'd.  Patient is planning [place].  Patient is completing HEP independently.      Plan:    DC PT to HEP       Goals:   Short Term Goals  Time Frame for Short Term Goals: 3 weeks  Short Term Goal 1: Initiate HEP (Met Understanding noted)     Long Term Goals  Time Frame for Long Term Goals : 6 weeks  Long Term Goal 1: Independent in HEP (met)   Long Term Goal 2: Improve AROM to 0-120 to improve stairs. (Not met See above)  Long Term Goal 3: Patient to be able to ascend/descend stairs with recipricol gait pattern and 1 HHA. (Met Able to ascend and descend reciprocally and single UE assist)  Long Term Goal 4: Improve LE strength to 5/5. (Part met See above)  Long Term Goal 5: Imrpove KOOS to 75% or greater to improve tolerance to ADls (Met 76%)            Percentage of Goals Met:    4.5/6    Discharge Prognosis: [] Excellent [x] Good [] Fair  [] Poor     Goal Status:  [] Achieved [x] Partially Achieved  [] Not Achieved     Electronically signed by:  Mian Hurt, PT

## 2025-08-13 RX ORDER — CYCLOBENZAPRINE HCL 10 MG
10 TABLET ORAL 3 TIMES DAILY PRN
Qty: 40 TABLET | Refills: 0 | Status: SHIPPED | OUTPATIENT
Start: 2025-08-13

## 2025-08-21 RX ORDER — ESCITALOPRAM OXALATE 20 MG/1
TABLET ORAL
Qty: 90 TABLET | Refills: 0 | Status: SHIPPED | OUTPATIENT
Start: 2025-08-21

## (undated) DEVICE — BITE BLOCK W/VELCRO STRAP

## (undated) DEVICE — GOWN,AURORA,NON-REINFORCED,2XL: Brand: MEDLINE

## (undated) DEVICE — ELECTRODE PT RET AD L9FT HI MOIST COND ADH HYDRGEL CORDED

## (undated) DEVICE — TRAP SURG QUAD PARABOLA SLOT DSGN SFTY SCRN TRAPEASE

## (undated) DEVICE — MERCY DEFIANCE ENDO KIT: Brand: MEDLINE INDUSTRIES, INC.

## (undated) DEVICE — GLOVE ORANGE PI 7   MSG9070

## (undated) DEVICE — HOOD WITH PEEL AWAY FACE SHIELD: Brand: T7PLUS

## (undated) DEVICE — GLOVE PROTCT PF XL ANTIMICROBIAL A4 CUT RESIST SCEPTER LTX

## (undated) DEVICE — SNARE ENDOSCP L240CM SHTH DIA2.4MM LOOP W20MM MIN WRK CHN

## (undated) DEVICE — PAD,ABDOMINAL,5"X9",ST,LF,25/BX: Brand: MEDLINE INDUSTRIES, INC.

## (undated) DEVICE — SYSTEM MIXING AND DELIVERY BONE CEMENT MEDIUM VISCOSITY

## (undated) DEVICE — WEBRIL ST 6IN  20/CS

## (undated) DEVICE — FORCEPS BX L240CM JAW DIA2.4MM ORNG L CAP W/ NDL DISP RAD

## (undated) DEVICE — PACK PIN JOINT TOTAL

## (undated) DEVICE — MDHZ TOTAL HIP: Brand: MEDLINE INDUSTRIES, INC.

## (undated) DEVICE — GAUZE,SPONGE,4"X4",16PLY,XRAY,STRL,LF: Brand: MEDLINE

## (undated) DEVICE — BANDAGE COMPR M W6INXL10YD WHT BGE VELC E MTRX HK AND LOOP

## (undated) DEVICE — CO2 CANNULA,SSOFT,ADLT,7O2,4CO2,FEMALE: Brand: MEDLINE

## (undated) DEVICE — 9165 UNIVERSAL PATIENT PLATE: Brand: 3M™

## (undated) DEVICE — COLONOSCOPE ENDOSCP ABSORBENT SM PEDIATRIC 104 MM VISION

## (undated) DEVICE — SOLUTION IRRIG 3000ML 0.9% SOD CHL USP UROMATIC PLAS CONT

## (undated) DEVICE — KIT EVAC 0.13IN RECT TB DIA10FR 400CC PVC 3 SPR Y CONN DRN

## (undated) DEVICE — 450 ML BOTTLE OF 0.05% CHLORHEXIDINE GLUCONATE IN 99.95% STERILE WATER FOR IRRIGATION, USP AND APPLICATOR.: Brand: IRRISEPT ANTIMICROBIAL WOUND LAVAGE

## (undated) DEVICE — DRESSING PETRO W3XL8IN OIL EMUL N ADH GZ KNIT IMPREG CELOS

## (undated) DEVICE — SUTURE VICRYL + SZ 2-0 L27IN ABSRB UD CP-1 1/2 CIR REV CUT VCP266H

## (undated) DEVICE — INTENDED FOR TISSUE SEPARATION, AND OTHER PROCEDURES THAT REQUIRE A SHARP SURGICAL BLADE TO PUNCTURE OR CUT.: Brand: BARD-PARKER ® CARBON RIB-BACK BLADES

## (undated) DEVICE — IMPLANTABLE DEVICE
Type: IMPLANTABLE DEVICE | Site: KNEE | Status: NON-FUNCTIONAL
Removed: 2025-06-24

## (undated) DEVICE — DUAL CUT SAGITTAL BLADE

## (undated) DEVICE — CONTAINER,SPECIMEN,OR STERILE,4OZ: Brand: MEDLINE

## (undated) DEVICE — GLOVE ORANGE PI 8   MSG9080

## (undated) DEVICE — SUTURE ABSORBABLE MONOFILAMENT 1 CTX 36 CM 48 MM VIO PDS +

## (undated) DEVICE — GLOVE ORANGE PI 8 1/2   MSG9085

## (undated) DEVICE — 4-PORT MANIFOLD: Brand: NEPTUNE 2

## (undated) DEVICE — TUBE IRRIG HNDPC HI FLO TP INTRPULS W/SUCTION TUBE

## (undated) DEVICE — ZIMMER® STERILE DISPOSABLE TOURNIQUET CUFF WITH PLC, DUAL PORT, SINGLE BLADDER, 24 IN. (61 CM)

## (undated) DEVICE — GAUZE,SPONGE,4"X4",8PLY,STRL,LF,10/TRAY: Brand: MEDLINE